# Patient Record
Sex: FEMALE | Race: WHITE | HISPANIC OR LATINO | Employment: UNEMPLOYED | URBAN - METROPOLITAN AREA
[De-identification: names, ages, dates, MRNs, and addresses within clinical notes are randomized per-mention and may not be internally consistent; named-entity substitution may affect disease eponyms.]

---

## 2015-03-17 LAB — HM PAP SMEAR: NORMAL

## 2017-01-25 ENCOUNTER — OFFICE VISIT (OUTPATIENT)
Dept: SPORTS MEDICINE | Facility: CLINIC | Age: 52
End: 2017-01-25

## 2017-01-25 VITALS
BODY MASS INDEX: 24.74 KG/M2 | OXYGEN SATURATION: 99 % | WEIGHT: 126 LBS | DIASTOLIC BLOOD PRESSURE: 72 MMHG | HEART RATE: 80 BPM | SYSTOLIC BLOOD PRESSURE: 118 MMHG | HEIGHT: 60 IN

## 2017-01-25 DIAGNOSIS — I10 BENIGN ESSENTIAL HTN: Primary | ICD-10-CM

## 2017-01-25 DIAGNOSIS — R00.2 HEART PALPITATIONS: ICD-10-CM

## 2017-01-25 DIAGNOSIS — E78.2 MIXED HYPERLIPIDEMIA: ICD-10-CM

## 2017-01-25 DIAGNOSIS — R73.03 PREDIABETES: ICD-10-CM

## 2017-01-25 DIAGNOSIS — K21.9 GASTROESOPHAGEAL REFLUX DISEASE, ESOPHAGITIS PRESENCE NOT SPECIFIED: ICD-10-CM

## 2017-01-25 DIAGNOSIS — R07.89 SENSATION OF CHEST PRESSURE: ICD-10-CM

## 2017-01-25 DIAGNOSIS — M77.12 LATERAL EPICONDYLITIS OF BOTH ELBOWS: ICD-10-CM

## 2017-01-25 DIAGNOSIS — M77.11 LATERAL EPICONDYLITIS OF BOTH ELBOWS: ICD-10-CM

## 2017-01-25 PROCEDURE — 93000 ELECTROCARDIOGRAM COMPLETE: CPT | Performed by: FAMILY MEDICINE

## 2017-01-25 PROCEDURE — 99214 OFFICE O/P EST MOD 30 MIN: CPT | Performed by: FAMILY MEDICINE

## 2017-01-25 RX ORDER — DICLOFENAC SODIUM 75 MG/1
75 TABLET, DELAYED RELEASE ORAL 2 TIMES DAILY PRN
Qty: 60 TABLET | Refills: 1 | Status: SHIPPED | OUTPATIENT
Start: 2017-01-25 | End: 2017-05-25

## 2017-01-25 RX ORDER — LOSARTAN POTASSIUM AND HYDROCHLOROTHIAZIDE 12.5; 5 MG/1; MG/1
1 TABLET ORAL DAILY
Qty: 30 TABLET | Refills: 11 | Status: SHIPPED | OUTPATIENT
Start: 2017-01-25 | End: 2018-01-30 | Stop reason: SDUPTHER

## 2017-01-25 RX ORDER — LOVASTATIN 40 MG/1
40 TABLET ORAL DAILY
Qty: 30 TABLET | Refills: 11 | Status: SHIPPED | OUTPATIENT
Start: 2017-01-25 | End: 2018-01-30 | Stop reason: SDUPTHER

## 2017-01-25 RX ORDER — LORATADINE 10 MG/1
10 TABLET ORAL DAILY
Qty: 30 TABLET | Refills: 11 | Status: SHIPPED | OUTPATIENT
Start: 2017-01-25 | End: 2018-01-30 | Stop reason: SDUPTHER

## 2017-01-25 RX ORDER — FLUTICASONE PROPIONATE 50 MCG
2 SPRAY, SUSPENSION (ML) NASAL DAILY
Qty: 2 EACH | Refills: 11 | Status: SHIPPED | OUTPATIENT
Start: 2017-01-25 | End: 2018-01-30 | Stop reason: SDUPTHER

## 2017-01-25 RX ORDER — LANSOPRAZOLE 30 MG/1
30 CAPSULE, DELAYED RELEASE ORAL DAILY
Qty: 30 CAPSULE | Refills: 11 | Status: SHIPPED | OUTPATIENT
Start: 2017-01-25 | End: 2018-02-27 | Stop reason: SDUPTHER

## 2017-01-25 NOTE — MR AVS SNAPSHOT
Savita Naveed   1/25/2017 8:40 AM   Office Visit    Tfno del dpto:  [phone]   Número de visita:  [phone]    Personal Médico:  Diego Crum MD   Departamento:  [facility] SP[facility]IC[facility]                Your Full Care Plan              Today's Medication Changes          Estos cambios fueron actualizados el: 1/25/17 10:19 AM.  Si tiene alguna pregunta, contacte con woo personal médico.               New Medication(s)Ordered:     diclofenac 75 MG EC tablet   También conocido yimi:  VOLTAREN   Take 1 tablet by mouth 2 (Two) Times a Day As Needed (elbow pain).   Pedido por:  Diego Crum MD         Medication(s)that have changed:     losartan-hydrochlorothiazide 50-12.5 MG per tablet   También conocido yimi:  HYZAAR   Take 1 tablet by mouth Daily.   Lo que cambió:  Richard las nuevas instrucciones.   Modificado por:  Diego Crum MD         Stop taking medication(s)listed here:     meloxicam 15 MG tablet   También conocido yimi:  MOBIC   Cancelado por:  Diego Crum MD                Dónde puede recoger rashida medicamentos      Estos medicamentos fueron enviados a [address] - [phone] [facility] [phone] [place] [address]     Teléfono:  [phone]     diclofenac 75 MG EC tablet    fluticasone 50 MCG/ACT nasal spray    lansoprazole 30 MG capsule    loratadine 10 MG tablet    losartan-hydrochlorothiazide 50-12.5 MG per tablet    lovastatin 40 MG tablet                  Your Updated Medication List          Lista actualizada el: 1/25/17 10:19 AM.  Siempre use woo lista de medicamentos más reciente.                diclofenac 75 MG EC tablet   También conocido yimi:  VOLTAREN   Take 1 tablet by mouth 2 (Two) Times a Day As Needed (elbow pain).       fluticasone 50 MCG/ACT nasal spray   También conocido yimi:  FLONASE   2 sprays into each nostril Daily.       lansoprazole 30 MG capsule   También conocido yimi:  PREVACID   Take 1 capsule by mouth Daily.       loratadine 10 MG tablet   También conocido yimi:  CLARITIN   Take 1 tablet by mouth Daily.       losartan-hydrochlorothiazide 50-12.5 MG per tablet   También conocido yimi:  HYZAAR   Take 1 tablet by mouth Daily.       lovastatin 40 MG tablet   También conocido yimi:  MEVACOR   Take 1 tablet by mouth Daily.       medroxyPROGESTERone 150 MG/ML injection   También conocido yimi:  DEPO-PROVERA               We Performed the Following     CBC & Differential     Comprehensive Metabolic Panel     ECG 12 Lead     Hemoglobin A1c     Lipid Panel     TSH     UA / M With / Rflx Culture([facility] ONLY)       You Were Diagnosed With        Códigos Comentarios    Benign essential HTN    -  Primary ICD-10-CM: I10  ICD-9-CM: 401.1     Mixed hyperlipidemia     ICD-10-CM: E78.2  ICD-9-CM: 272.2     Prediabetes     ICD-10-CM: R73.03  ICD-9-CM: 790.29     Gastroesophageal reflux disease, esophagitis presence not specified     ICD-10-CM: K21.9  ICD-9-CM: 530.81     Lateral epicondylitis of both elbows     ICD-10-CM: M77.11, M77.12  ICD-9-CM: 726.32     Sensation of chest pressure     ICD-10-CM: R07.89  ICD-9-CM: 786.59     Heart palpitations     ICD-10-CM: R00.2  ICD-9-CM: 785.1       Instructions     Chelsy    Patient Instructions History      Upcoming Appointments     Visit Type Date Time Department    OFFICE VISIT 1/25/2017  8:40 AM [facility]    MAMMO THOMAS SCREEN BILAT 1/30/2017  2:00 PM  THOMAS MAMMO [facility]      MyChart Signup     Our records indicate that you have declined [facility] MyChart signup. If you would like to sign up for [employer], please email [facility] [email or web address] or call [phone] to obtain an activation code.             Other Info from Your Visit           Your Appointments     Jan 30, 2017  2:00 PM EST   Mammo thomas screen bilat with THOMAS UCE MAMM 1   [facility] [place]  "Mammography ([place])    [address] [address] [phone]            Bring any films and reports from outside facilities. Arrive 15 min prior to exam time. Do not apply any lotions, oils, body sprays, powders, perfumes, or deodorants on the day of the exam. Bring a current list of medications. Arrive 15 mi              Alergias     No Known Drug Allergy        Reason for Visit     Hypertension Pt is here to f/u      Vital Signs     Blood Pressure Pulso Height Weight Oxygen Saturation Body Mass Index    118/72 80 60\" (152.4 cm) 126 lb (57.2 kg) 99% 24.61 kg/m2    Smoking Status                   Never Smoker           Problems and Diagnoses Noted     Acid reflux disease    Benign essential HTN    High cholesterol or triglycerides    Prediabetes    Lateral epicondylitis of both elbows        Sensation of chest pressure        Heart palpitations                                      Savita Naveed   1/25/2017 8:40 AM   Office Visit    Dept Phone:  [phone]   Encounter #:  [id number]    Provider:  Diego Crum MD   Department:  [facility] FAMILY AND SPORTS MEDICINE                Your Full Care Plan              Today's Medication Changes          These changes are accurate as of: 1/25/17 10:19 AM.  If you have any questions, ask your nurse or doctor.               New Medication(s)Ordered:     diclofenac 75 MG EC tablet   Commonly known as:  VOLTAREN   Take 1 tablet by mouth 2 (Two) Times a Day As Needed (elbow pain).   Started by:  Diego Crum MD         Medication(s)that have changed:     losartan-hydrochlorothiazide 50-12.5 MG per tablet   Commonly known as:  HYZAAR   Take 1 tablet by mouth Daily.   What changed:  See the new instructions.   Changed by:  Diego Crum MD         Stop taking medication(s)listed here:     meloxicam 15 MG tablet   Commonly known as:  MOBIC   Stopped by:  Diego Crum MD                Where to Get Your Medications      These " medications were sent to [address] - [phone]  - [phone] [place] [address]     Phone:  [phone]     diclofenac 75 MG EC tablet    fluticasone 50 MCG/ACT nasal spray    lansoprazole 30 MG capsule    loratadine 10 MG tablet    losartan-hydrochlorothiazide 50-12.5 MG per tablet    lovastatin 40 MG tablet                  Your Updated Medication List          This list is accurate as of: 1/25/17 10:19 AM.  Always use your most recent med list.                diclofenac 75 MG EC tablet   Commonly known as:  VOLTAREN   Take 1 tablet by mouth 2 (Two) Times a Day As Needed (elbow pain).       fluticasone 50 MCG/ACT nasal spray   Commonly known as:  FLONASE   2 sprays into each nostril Daily.       lansoprazole 30 MG capsule   Commonly known as:  PREVACID   Take 1 capsule by mouth Daily.       loratadine 10 MG tablet   Commonly known as:  CLARITIN   Take 1 tablet by mouth Daily.       losartan-hydrochlorothiazide 50-12.5 MG per tablet   Commonly known as:  HYZAAR   Take 1 tablet by mouth Daily.       lovastatin 40 MG tablet   Commonly known as:  MEVACOR   Take 1 tablet by mouth Daily.       medroxyPROGESTERone 150 MG/ML injection   Commonly known as:  DEPO-PROVERA               We Performed the Following     CBC & Differential     Comprehensive Metabolic Panel     ECG 12 Lead     Hemoglobin A1c     Lipid Panel     TSH     UA / M With / Rflx Culture([facility] ONLY)       You Were Diagnosed With        Codes Comments    Benign essential HTN    -  Primary ICD-10-CM: I10  ICD-9-CM: 401.1     Mixed hyperlipidemia     ICD-10-CM: E78.2  ICD-9-CM: 272.2     Prediabetes     ICD-10-CM: R73.03  ICD-9-CM: 790.29     Gastroesophageal reflux disease, esophagitis presence not specified     ICD-10-CM: K21.9  ICD-9-CM: 530.81     Lateral epicondylitis of both elbows     ICD-10-CM: M77.11, M77.12  ICD-9-CM: 726.32     Sensation  "of chest pressure     ICD-10-CM: R07.89  ICD-9-CM: 786.59     Heart palpitations     ICD-10-CM: R00.2  ICD-9-CM: 785.1       Instructions     None    Patient Instructions History      Upcoming Appointments     Visit Type Date Time Department    OFFICE VISIT 1/25/2017  8:40 AM [facility]    MAMMO THOMAS SCREEN BILAT 1/30/2017  2:00 PM  THOMAS MAMMO [facility]      MyChart Signup     Our records indicate that you have declined [facility] MyChart signup. If you would like to sign up for [employer], please email [email or web address] or call [phone] to obtain an activation code.             Other Info from Your Visit           Your Appointments     Jan 30, 2017  2:00 PM EST   Mammo thomas screen bilat with THOMAS UCE MAMM 1   [facility] [place] [facility] ([place])    [address] [place] [address]   [phone]            Bring any films and reports from outside facilities. Arrive 15 min prior to exam time. Do not apply any lotions, oils, body sprays, powders, perfumes, or deodorants on the day of the exam. Bring a current list of medications. Arrive 15 mi              Allergies     No Known Drug Allergy        Reason for Visit     Hypertension Pt is here to f/u      Vital Signs     Blood Pressure Pulse Height Weight Oxygen Saturation Body Mass Index    118/72 80 60\" (152.4 cm) 126 lb (57.2 kg) 99% 24.61 kg/m2    Smoking Status                   Never Smoker           Problems and Diagnoses Noted     Acid reflux disease    Benign essential HTN    High cholesterol or triglycerides    Prediabetes    Lateral epicondylitis of both elbows        Sensation of chest pressure        Heart palpitations            "

## 2017-01-25 NOTE — PROGRESS NOTES
"Savita is a 51 y.o. year old female    Chief Complaint   Patient presents with   • Hypertension     Pt is here to f/u       History of Present Illness  Here today to follow-up on multiple medical problems.    Hypertension, hyperlipidemia are both stable.  Continues relatively healthy diet, not much physical activity.  bilateral elbow pain radiating to the forearm that has been on and off for several weeks.  Sharp, worse with lifting objects.  No injury.  Also reports having some episodic fast heart rate, comes for a few days and then goes away without clear cause and effect.  Is associated with some abnormal sensation in the chest but not really pain.  Is not associated with physical activities.  No associated shortness of breath.    I have reviewed the patient's medical history in detail and updated the computerized patient record.    Review of Systems   Constitutional: Negative.    Respiratory: Negative.    Cardiovascular: Positive for chest pain. Negative for leg swelling.   Musculoskeletal: Positive for myalgias.   Neurological: Negative for numbness.   Psychiatric/Behavioral: Negative.        Visit Vitals   • /72   • Pulse 80   • Ht 60\" (152.4 cm)   • Wt 126 lb (57.2 kg)   • SpO2 99%   • BMI 24.61 kg/m2        Physical Exam   Constitutional: She appears well-developed and well-nourished.   HENT:   Mouth/Throat: Oropharynx is clear and moist.   Eyes: Conjunctivae are normal. Pupils are equal, round, and reactive to light.   Cardiovascular: Normal rate, regular rhythm, normal heart sounds and intact distal pulses.    Pulmonary/Chest: Effort normal and breath sounds normal.   Musculoskeletal:   Bilat UE: Normal appearance. TTP lateral epicondyle and common extensor tendon. Normal elbow ROM. +pain with wrist flexion stretch, resisted wrist extension. Normal strength. No elbow laxity.    Psychiatric: She has a normal mood and affect.   Vitals reviewed.    EKG Interpretation    Indication: elevated HR, atypical " pain    Findings:  Normal Rate  Normal Rhythm  Normal QRS  Normal Axis  Normal ST Segment  Normal T Waves    No prior studies were available for comparison.        Diagnoses and all orders for this visit:    Benign essential HTN  -     CBC & Differential  -     Comprehensive Metabolic Panel  -     Lipid Panel  -     TSH  -     Hemoglobin A1c  -     UA / M With / Rflx Culture([facility] ONLY)    Mixed hyperlipidemia  -     CBC & Differential  -     Comprehensive Metabolic Panel  -     Lipid Panel  -     TSH  -     Hemoglobin A1c  -     UA / M With / Rflx Culture([facility] ONLY)    Prediabetes  -     CBC & Differential  -     Comprehensive Metabolic Panel  -     Lipid Panel  -     TSH  -     Hemoglobin A1c  -     UA / M With / Rflx Culture([facility] ONLY)    Gastroesophageal reflux disease, esophagitis presence not specified    Lateral epicondylitis of both elbows    Sensation of chest pressure  -     ECG 12 Lead    Heart palpitations  -     ECG 12 Lead  -     Holter Monitor - 24 Hour; Future    Other orders  -     losartan-hydrochlorothiazide (HYZAAR) 50-12.5 MG per tablet; Take 1 tablet by mouth Daily.  -     lovastatin (MEVACOR) 40 MG tablet; Take 1 tablet by mouth Daily.  -     lansoprazole (PREVACID) 30 MG capsule; Take 1 capsule by mouth Daily.  -     fluticasone (FLONASE) 50 MCG/ACT nasal spray; 2 sprays into each nostril Daily.  -     loratadine (CLARITIN) 10 MG tablet; Take 1 tablet by mouth Daily.  -     diclofenac (VOLTAREN) 75 MG EC tablet; Take 1 tablet by mouth 2 (Two) Times a Day As Needed (elbow pain).  -     Microscopic Examination    Continue current tx of chronic problems.  Discussed HEP for LE along with voltaren. PT if not improving.  Atypical CP and palpitations - EKG normal, sounds benign. Will check Holter.

## 2017-01-26 LAB
ALBUMIN SERPL-MCNC: 4.3 G/DL (ref 3.5–5.2)
ALBUMIN/GLOB SERPL: 1.5 G/DL
ALP SERPL-CCNC: 48 U/L (ref 39–117)
ALT SERPL-CCNC: 21 U/L (ref 1–33)
APPEARANCE UR: CLEAR
AST SERPL-CCNC: 14 U/L (ref 1–32)
BACTERIA #/AREA URNS HPF: NORMAL /HPF
BASOPHILS # BLD AUTO: 0.02 10*3/MM3 (ref 0–0.2)
BASOPHILS NFR BLD AUTO: 0.3 % (ref 0–1.5)
BILIRUB SERPL-MCNC: 0.4 MG/DL (ref 0.1–1.2)
BILIRUB UR QL STRIP: NEGATIVE
BUN SERPL-MCNC: 11 MG/DL (ref 6–20)
BUN/CREAT SERPL: 15.7 (ref 7–25)
CALCIUM SERPL-MCNC: 9.4 MG/DL (ref 8.6–10.5)
CHLORIDE SERPL-SCNC: 102 MMOL/L (ref 98–107)
CHOLEST SERPL-MCNC: 172 MG/DL (ref 0–200)
CO2 SERPL-SCNC: 24.6 MMOL/L (ref 22–29)
COLOR UR: YELLOW
CREAT SERPL-MCNC: 0.7 MG/DL (ref 0.57–1)
EOSINOPHIL # BLD AUTO: 0.09 10*3/MM3 (ref 0–0.7)
EOSINOPHIL NFR BLD AUTO: 1.4 % (ref 0.3–6.2)
EPI CELLS #/AREA URNS HPF: NORMAL /HPF
ERYTHROCYTE [DISTWIDTH] IN BLOOD BY AUTOMATED COUNT: 15.1 % (ref 11.7–13)
GLOBULIN SER CALC-MCNC: 2.8 GM/DL
GLUCOSE SERPL-MCNC: 90 MG/DL (ref 65–99)
GLUCOSE UR QL: NEGATIVE
HBA1C MFR BLD: 5.15 % (ref 4.8–5.6)
HCT VFR BLD AUTO: 40.8 % (ref 35.6–45.5)
HDLC SERPL-MCNC: 38 MG/DL (ref 40–60)
HGB BLD-MCNC: 13.2 G/DL (ref 11.9–15.5)
HGB UR QL STRIP: NEGATIVE
IMM GRANULOCYTES # BLD: 0.02 10*3/MM3 (ref 0–0.03)
IMM GRANULOCYTES NFR BLD: 0.3 % (ref 0–0.5)
KETONES UR QL STRIP: NEGATIVE
LDLC SERPL CALC-MCNC: 110 MG/DL (ref 0–100)
LEUKOCYTE ESTERASE UR QL STRIP: NEGATIVE
LYMPHOCYTES # BLD AUTO: 2.41 10*3/MM3 (ref 0.9–4.8)
LYMPHOCYTES NFR BLD AUTO: 37 % (ref 19.6–45.3)
MCH RBC QN AUTO: 25.3 PG (ref 26.9–32)
MCHC RBC AUTO-ENTMCNC: 32.4 G/DL (ref 32.4–36.3)
MCV RBC AUTO: 78.3 FL (ref 80.5–98.2)
MICRO URNS: NORMAL
MICRO URNS: NORMAL
MONOCYTES # BLD AUTO: 0.5 10*3/MM3 (ref 0.2–1.2)
MONOCYTES NFR BLD AUTO: 7.7 % (ref 5–12)
MUCOUS THREADS URNS QL MICRO: PRESENT /HPF
NEUTROPHILS # BLD AUTO: 3.48 10*3/MM3 (ref 1.9–8.1)
NEUTROPHILS NFR BLD AUTO: 53.3 % (ref 42.7–76)
NITRITE UR QL STRIP: NEGATIVE
PH UR STRIP: 6.5 [PH] (ref 5–7.5)
PLATELET # BLD AUTO: 334 10*3/MM3 (ref 140–500)
POTASSIUM SERPL-SCNC: 3.9 MMOL/L (ref 3.5–5.2)
PROT SERPL-MCNC: 7.1 G/DL (ref 6–8.5)
PROT UR QL STRIP: NEGATIVE
RBC # BLD AUTO: 5.21 10*6/MM3 (ref 3.9–5.2)
RBC #/AREA URNS HPF: NORMAL /HPF
SODIUM SERPL-SCNC: 140 MMOL/L (ref 136–145)
SP GR UR: 1.01 (ref 1–1.03)
TRIGL SERPL-MCNC: 119 MG/DL (ref 0–150)
TSH SERPL DL<=0.005 MIU/L-ACNC: 1.91 MIU/ML (ref 0.27–4.2)
URINALYSIS REFLEX: NORMAL
UROBILINOGEN UR STRIP-MCNC: 0.2 MG/DL (ref 0.2–1)
VLDLC SERPL CALC-MCNC: 23.8 MG/DL (ref 5–40)
WBC # BLD AUTO: 6.52 10*3/MM3 (ref 4.5–10.7)
WBC #/AREA URNS HPF: NORMAL /HPF

## 2017-01-30 ENCOUNTER — HOSPITAL ENCOUNTER (OUTPATIENT)
Dept: MAMMOGRAPHY | Facility: HOSPITAL | Age: 52
Discharge: HOME | End: 2017-01-30
Attending: OBSTETRICS & GYNECOLOGY | Admitting: OBSTETRICS & GYNECOLOGY

## 2017-01-30 DIAGNOSIS — Z13.9 SCREENING: ICD-10-CM

## 2017-01-30 PROCEDURE — G0202 SCR MAMMO BI INCL CAD: HCPCS

## 2017-02-02 ENCOUNTER — TRANSCRIBE ORDERS (OUTPATIENT)
Dept: ADMINISTRATIVE | Facility: HOSPITAL | Age: 52
End: 2017-02-02

## 2017-02-02 DIAGNOSIS — R92.8 ABNORMAL MAMMOGRAM: Primary | ICD-10-CM

## 2017-02-07 ENCOUNTER — TRANSCRIBE ORDERS (OUTPATIENT)
Dept: ADMINISTRATIVE | Facility: HOSPITAL | Age: 52
End: 2017-02-07

## 2017-02-07 DIAGNOSIS — R92.8 ABNORMAL MAMMOGRAM: Primary | ICD-10-CM

## 2017-02-13 ENCOUNTER — HOSPITAL ENCOUNTER (OUTPATIENT)
Dept: ULTRASOUND IMAGING | Facility: HOSPITAL | Age: 52
End: 2017-02-13
Attending: OBSTETRICS & GYNECOLOGY

## 2017-02-13 ENCOUNTER — HOSPITAL ENCOUNTER (OUTPATIENT)
Dept: MAMMOGRAPHY | Facility: HOSPITAL | Age: 52
Discharge: HOME | End: 2017-02-13
Attending: OBSTETRICS & GYNECOLOGY | Admitting: OBSTETRICS & GYNECOLOGY

## 2017-02-13 DIAGNOSIS — R92.8 ABNORMAL MAMMOGRAM: ICD-10-CM

## 2017-02-13 PROCEDURE — G0206 DX MAMMO INCL CAD UNI: HCPCS

## 2017-05-25 ENCOUNTER — CLINICAL SUPPORT (OUTPATIENT)
Dept: OBSTETRICS AND GYNECOLOGY | Facility: CLINIC | Age: 52
End: 2017-05-25

## 2017-05-25 ENCOUNTER — OFFICE VISIT (OUTPATIENT)
Dept: OBSTETRICS AND GYNECOLOGY | Facility: CLINIC | Age: 52
End: 2017-05-25

## 2017-05-25 VITALS
SYSTOLIC BLOOD PRESSURE: 130 MMHG | HEIGHT: 60 IN | DIASTOLIC BLOOD PRESSURE: 84 MMHG | BODY MASS INDEX: 23.95 KG/M2 | WEIGHT: 122 LBS

## 2017-05-25 DIAGNOSIS — Z01.419 ENCOUNTER FOR GYNECOLOGICAL EXAMINATION WITHOUT ABNORMAL FINDING: Primary | ICD-10-CM

## 2017-05-25 DIAGNOSIS — Z11.51 ENCOUNTER FOR SCREENING FOR HUMAN PAPILLOMAVIRUS (HPV): ICD-10-CM

## 2017-05-25 DIAGNOSIS — N64.4 MASTODYNIA: ICD-10-CM

## 2017-05-25 DIAGNOSIS — Z00.00 ANNUAL PHYSICAL EXAM: ICD-10-CM

## 2017-05-25 DIAGNOSIS — Z30.42 ENCOUNTER FOR SURVEILLANCE OF INJECTABLE CONTRACEPTIVE: Primary | ICD-10-CM

## 2017-05-25 LAB
B-HCG UR QL: NEGATIVE
BILIRUB BLD-MCNC: NEGATIVE MG/DL
CLARITY, POC: CLEAR
COLOR UR: YELLOW
GLUCOSE UR STRIP-MCNC: NEGATIVE MG/DL
INTERNAL NEGATIVE CONTROL: NEGATIVE
INTERNAL POSITIVE CONTROL: POSITIVE
KETONES UR QL: NEGATIVE
LEUKOCYTE EST, POC: NEGATIVE
Lab: NORMAL
NITRITE UR-MCNC: NEGATIVE MG/ML
PH UR: 6 [PH] (ref 5–8)
PROT UR STRIP-MCNC: NEGATIVE MG/DL
RBC # UR STRIP: NEGATIVE /UL
SP GR UR: 1.03 (ref 1–1.03)
UROBILINOGEN UR QL: NORMAL

## 2017-05-25 PROCEDURE — 99396 PREV VISIT EST AGE 40-64: CPT | Performed by: OBSTETRICS & GYNECOLOGY

## 2017-05-25 PROCEDURE — 81025 URINE PREGNANCY TEST: CPT | Performed by: OBSTETRICS & GYNECOLOGY

## 2017-05-25 PROCEDURE — 81002 URINALYSIS NONAUTO W/O SCOPE: CPT | Performed by: OBSTETRICS & GYNECOLOGY

## 2017-05-25 PROCEDURE — 96372 THER/PROPH/DIAG INJ SC/IM: CPT | Performed by: OBSTETRICS & GYNECOLOGY

## 2017-05-25 RX ORDER — MEDROXYPROGESTERONE ACETATE 150 MG/ML
150 INJECTION, SUSPENSION INTRAMUSCULAR
Qty: 150 MG | Refills: 3 | Status: SHIPPED | OUTPATIENT
Start: 2017-05-25 | End: 2017-08-10

## 2017-05-25 RX ORDER — MEDROXYPROGESTERONE ACETATE 150 MG/ML
INJECTION, SUSPENSION INTRAMUSCULAR
COMMUNITY
Start: 2017-03-13 | End: 2019-03-14

## 2017-05-25 RX ORDER — MEDROXYPROGESTERONE ACETATE 150 MG/ML
150 INJECTION, SUSPENSION INTRAMUSCULAR ONCE
Status: COMPLETED | OUTPATIENT
Start: 2017-05-25 | End: 2017-05-25

## 2017-05-25 RX ADMIN — MEDROXYPROGESTERONE ACETATE 150 MG: 150 INJECTION, SUSPENSION INTRAMUSCULAR at 13:48

## 2017-05-27 PROBLEM — N64.4 MASTODYNIA: Status: ACTIVE | Noted: 2017-05-27

## 2017-05-27 PROBLEM — N64.4 BILATERAL MASTODYNIA: Status: ACTIVE | Noted: 2017-05-27

## 2017-05-27 PROBLEM — N64.4 MASTALGIA: Status: RESOLVED | Noted: 2017-05-27 | Resolved: 2017-05-27

## 2017-05-27 PROBLEM — N64.4 MASTALGIA: Status: ACTIVE | Noted: 2017-05-27

## 2017-05-30 LAB
CYTOLOGIST CVX/VAG CYTO: NORMAL
CYTOLOGY CVX/VAG DOC THIN PREP: NORMAL
DX ICD CODE: NORMAL
HIV 1 & 2 AB SER-IMP: NORMAL
HPV I/H RISK 1 DNA CVX QL PROBE+SIG AMP: NEGATIVE
OTHER STN SPEC: NORMAL
PATH REPORT.FINAL DX SPEC: NORMAL
STAT OF ADQ CVX/VAG CYTO-IMP: NORMAL

## 2017-08-10 ENCOUNTER — CLINICAL SUPPORT (OUTPATIENT)
Dept: OBSTETRICS AND GYNECOLOGY | Facility: CLINIC | Age: 52
End: 2017-08-10

## 2017-08-10 DIAGNOSIS — Z30.013 ENCOUNTER FOR INITIAL PRESCRIPTION OF INJECTABLE CONTRACEPTIVE: Primary | ICD-10-CM

## 2017-08-10 PROCEDURE — 96372 THER/PROPH/DIAG INJ SC/IM: CPT | Performed by: OBSTETRICS & GYNECOLOGY

## 2017-08-10 RX ORDER — MEDROXYPROGESTERONE ACETATE 150 MG/ML
150 INJECTION, SUSPENSION INTRAMUSCULAR ONCE
Status: COMPLETED | OUTPATIENT
Start: 2017-08-10 | End: 2017-08-10

## 2017-08-10 RX ADMIN — MEDROXYPROGESTERONE ACETATE 150 MG: 150 INJECTION, SUSPENSION INTRAMUSCULAR at 12:56

## 2018-01-09 ENCOUNTER — TRANSCRIBE ORDERS (OUTPATIENT)
Dept: ADMINISTRATIVE | Facility: HOSPITAL | Age: 53
End: 2018-01-09

## 2018-01-09 DIAGNOSIS — Z12.31 VISIT FOR SCREENING MAMMOGRAM: Primary | ICD-10-CM

## 2018-01-30 RX ORDER — LORATADINE 10 MG/1
TABLET ORAL
Qty: 30 TABLET | Refills: 0 | Status: SHIPPED | OUTPATIENT
Start: 2018-01-30 | End: 2018-02-27 | Stop reason: SDUPTHER

## 2018-01-30 RX ORDER — LOSARTAN POTASSIUM AND HYDROCHLOROTHIAZIDE 12.5; 5 MG/1; MG/1
TABLET ORAL
Qty: 30 TABLET | Refills: 0 | Status: SHIPPED | OUTPATIENT
Start: 2018-01-30 | End: 2018-02-27 | Stop reason: SDUPTHER

## 2018-01-30 RX ORDER — FLUTICASONE PROPIONATE 50 MCG
SPRAY, SUSPENSION (ML) NASAL
Qty: 1 BOTTLE | Refills: 0 | Status: SHIPPED | OUTPATIENT
Start: 2018-01-30 | End: 2018-02-27 | Stop reason: SDUPTHER

## 2018-01-30 RX ORDER — LOVASTATIN 40 MG/1
TABLET ORAL
Qty: 30 TABLET | Refills: 0 | Status: SHIPPED | OUTPATIENT
Start: 2018-01-30 | End: 2018-02-27 | Stop reason: SDUPTHER

## 2018-02-13 ENCOUNTER — HOSPITAL ENCOUNTER (OUTPATIENT)
Dept: MAMMOGRAPHY | Facility: HOSPITAL | Age: 53
Discharge: HOME | End: 2018-02-13
Attending: OBSTETRICS & GYNECOLOGY | Admitting: OBSTETRICS & GYNECOLOGY

## 2018-02-13 DIAGNOSIS — Z12.31 VISIT FOR SCREENING MAMMOGRAM: ICD-10-CM

## 2018-02-13 PROCEDURE — 77063 BREAST TOMOSYNTHESIS BI: CPT

## 2018-02-13 PROCEDURE — 77067 SCR MAMMO BI INCL CAD: CPT

## 2018-02-18 DIAGNOSIS — R92.8 ABNORMAL MAMMOGRAM OF RIGHT BREAST: Primary | ICD-10-CM

## 2018-02-27 ENCOUNTER — OFFICE VISIT (OUTPATIENT)
Dept: SPORTS MEDICINE | Facility: CLINIC | Age: 53
End: 2018-02-27

## 2018-02-27 VITALS
HEIGHT: 60 IN | OXYGEN SATURATION: 98 % | BODY MASS INDEX: 23.75 KG/M2 | TEMPERATURE: 98.6 F | SYSTOLIC BLOOD PRESSURE: 108 MMHG | WEIGHT: 121 LBS | HEART RATE: 83 BPM | DIASTOLIC BLOOD PRESSURE: 64 MMHG

## 2018-02-27 DIAGNOSIS — R71.8 MICROCYTIC RED BLOOD CELLS: ICD-10-CM

## 2018-02-27 DIAGNOSIS — Z00.00 ANNUAL PHYSICAL EXAM: Primary | ICD-10-CM

## 2018-02-27 PROCEDURE — 99396 PREV VISIT EST AGE 40-64: CPT | Performed by: FAMILY MEDICINE

## 2018-02-27 RX ORDER — LORATADINE 10 MG/1
10 TABLET ORAL DAILY
Qty: 90 TABLET | Refills: 3 | Status: SHIPPED | OUTPATIENT
Start: 2018-02-27 | End: 2019-03-07 | Stop reason: SDUPTHER

## 2018-02-27 RX ORDER — LANSOPRAZOLE 30 MG/1
30 CAPSULE, DELAYED RELEASE ORAL DAILY
Qty: 90 CAPSULE | Refills: 3 | Status: SHIPPED | OUTPATIENT
Start: 2018-02-27 | End: 2020-08-03 | Stop reason: SDUPTHER

## 2018-02-27 RX ORDER — LOVASTATIN 40 MG/1
40 TABLET ORAL NIGHTLY
Qty: 90 TABLET | Refills: 3 | Status: SHIPPED | OUTPATIENT
Start: 2018-02-27 | End: 2019-03-07 | Stop reason: SDUPTHER

## 2018-02-27 RX ORDER — LOSARTAN POTASSIUM AND HYDROCHLOROTHIAZIDE 12.5; 5 MG/1; MG/1
1 TABLET ORAL DAILY
Qty: 90 TABLET | Refills: 3 | Status: SHIPPED | OUTPATIENT
Start: 2018-02-27 | End: 2019-03-07 | Stop reason: SDUPTHER

## 2018-02-27 RX ORDER — FLUTICASONE PROPIONATE 50 MCG
2 SPRAY, SUSPENSION (ML) NASAL DAILY
Qty: 3 BOTTLE | Refills: 3 | Status: SHIPPED | OUTPATIENT
Start: 2018-02-27 | End: 2019-03-07 | Stop reason: SDUPTHER

## 2018-02-28 LAB
25(OH)D3+25(OH)D2 SERPL-MCNC: 28 NG/ML (ref 30–100)
ALBUMIN SERPL-MCNC: 4.5 G/DL (ref 3.5–5.2)
ALBUMIN/GLOB SERPL: 1.7 G/DL
ALP SERPL-CCNC: 55 U/L (ref 39–117)
ALT SERPL-CCNC: 28 U/L (ref 1–33)
APPEARANCE UR: CLEAR
AST SERPL-CCNC: 18 U/L (ref 1–32)
BACTERIA #/AREA URNS HPF: NORMAL /HPF
BASOPHILS # BLD AUTO: 0.03 10*3/MM3 (ref 0–0.2)
BASOPHILS NFR BLD AUTO: 0.3 % (ref 0–1.5)
BILIRUB SERPL-MCNC: 0.3 MG/DL (ref 0.1–1.2)
BILIRUB UR QL STRIP: NEGATIVE
BUN SERPL-MCNC: 11 MG/DL (ref 6–20)
BUN/CREAT SERPL: 15.7 (ref 7–25)
CALCIUM SERPL-MCNC: 9.5 MG/DL (ref 8.6–10.5)
CHLORIDE SERPL-SCNC: 101 MMOL/L (ref 98–107)
CHOLEST SERPL-MCNC: 177 MG/DL (ref 0–200)
CO2 SERPL-SCNC: 25.2 MMOL/L (ref 22–29)
COLOR UR: YELLOW
CREAT SERPL-MCNC: 0.7 MG/DL (ref 0.57–1)
EOSINOPHIL # BLD AUTO: 0.11 10*3/MM3 (ref 0–0.7)
EOSINOPHIL NFR BLD AUTO: 1.2 % (ref 0.3–6.2)
EPI CELLS #/AREA URNS HPF: NORMAL /HPF
ERYTHROCYTE [DISTWIDTH] IN BLOOD BY AUTOMATED COUNT: 15.2 % (ref 11.7–13)
GFR SERPLBLD CREATININE-BSD FMLA CKD-EPI: 107 ML/MIN/1.73
GFR SERPLBLD CREATININE-BSD FMLA CKD-EPI: 89 ML/MIN/1.73
GLOBULIN SER CALC-MCNC: 2.7 GM/DL
GLUCOSE SERPL-MCNC: 95 MG/DL (ref 65–99)
GLUCOSE UR QL: NEGATIVE
HBA1C MFR BLD: 5.3 % (ref 4.8–5.6)
HCT VFR BLD AUTO: 40.9 % (ref 35.6–45.5)
HDLC SERPL-MCNC: 43 MG/DL (ref 40–60)
HGB BLD-MCNC: 13 G/DL (ref 11.9–15.5)
HGB UR QL STRIP: NEGATIVE
IMM GRANULOCYTES # BLD: 0.02 10*3/MM3 (ref 0–0.03)
IMM GRANULOCYTES NFR BLD: 0.2 % (ref 0–0.5)
KETONES UR QL STRIP: NEGATIVE
LDLC SERPL CALC-MCNC: 107 MG/DL (ref 0–100)
LEUKOCYTE ESTERASE UR QL STRIP: NEGATIVE
LYMPHOCYTES # BLD AUTO: 3.11 10*3/MM3 (ref 0.9–4.8)
LYMPHOCYTES NFR BLD AUTO: 33.2 % (ref 19.6–45.3)
MCH RBC QN AUTO: 24.5 PG (ref 26.9–32)
MCHC RBC AUTO-ENTMCNC: 31.8 G/DL (ref 32.4–36.3)
MCV RBC AUTO: 77 FL (ref 80.5–98.2)
MICRO URNS: NORMAL
MICRO URNS: NORMAL
MONOCYTES # BLD AUTO: 0.75 10*3/MM3 (ref 0.2–1.2)
MONOCYTES NFR BLD AUTO: 8 % (ref 5–12)
NEUTROPHILS # BLD AUTO: 5.35 10*3/MM3 (ref 1.9–8.1)
NEUTROPHILS NFR BLD AUTO: 57.1 % (ref 42.7–76)
NITRITE UR QL STRIP: NEGATIVE
PH UR STRIP: 6.5 [PH] (ref 5–7.5)
PLATELET # BLD AUTO: 320 10*3/MM3 (ref 140–500)
POTASSIUM SERPL-SCNC: 3.5 MMOL/L (ref 3.5–5.2)
PROT SERPL-MCNC: 7.2 G/DL (ref 6–8.5)
PROT UR QL STRIP: NEGATIVE
RBC # BLD AUTO: 5.31 10*6/MM3 (ref 3.9–5.2)
RBC #/AREA URNS HPF: NORMAL /HPF
SODIUM SERPL-SCNC: 141 MMOL/L (ref 136–145)
SP GR UR: 1.01 (ref 1–1.03)
TRIGL SERPL-MCNC: 136 MG/DL (ref 0–150)
TSH SERPL DL<=0.005 MIU/L-ACNC: 2.07 UIU/ML (ref 0.45–4.5)
URINALYSIS REFLEX: NORMAL
UROBILINOGEN UR STRIP-MCNC: 0.2 MG/DL (ref 0.2–1)
VLDLC SERPL CALC-MCNC: 27.2 MG/DL (ref 5–40)
WBC # BLD AUTO: 9.37 10*3/MM3 (ref 4.5–10.7)
WBC #/AREA URNS HPF: NORMAL /HPF

## 2018-03-05 LAB
FERRITIN SERPL-MCNC: 316.5 NG/ML (ref 13–150)
IRON SATN MFR SERPL: 30 % (ref 20–50)
IRON SERPL-MCNC: 119 MCG/DL (ref 37–145)
Lab: NORMAL
TIBC SERPL-MCNC: 402 MCG/DL
UIBC SERPL-MCNC: 283 MCG/DL
WRITTEN AUTHORIZATION: NORMAL

## 2018-03-06 ENCOUNTER — HOSPITAL ENCOUNTER (OUTPATIENT)
Dept: MAMMOGRAPHY | Facility: HOSPITAL | Age: 53
Discharge: HOME | End: 2018-03-06
Attending: OBSTETRICS & GYNECOLOGY | Admitting: OBSTETRICS & GYNECOLOGY

## 2018-03-06 ENCOUNTER — APPOINTMENT (OUTPATIENT)
Dept: ULTRASOUND IMAGING | Facility: HOSPITAL | Age: 53
End: 2018-03-06
Attending: OBSTETRICS & GYNECOLOGY

## 2018-03-06 DIAGNOSIS — R92.8 ABNORMAL MAMMOGRAM OF RIGHT BREAST: ICD-10-CM

## 2018-03-06 PROCEDURE — 77065 DX MAMMO INCL CAD UNI: CPT

## 2018-03-20 ENCOUNTER — OFFICE VISIT (OUTPATIENT)
Dept: OBSTETRICS AND GYNECOLOGY | Facility: CLINIC | Age: 53
End: 2018-03-20

## 2018-03-20 ENCOUNTER — TELEPHONE (OUTPATIENT)
Dept: SURGERY | Facility: CLINIC | Age: 53
End: 2018-03-20

## 2018-03-20 VITALS
HEIGHT: 60 IN | BODY MASS INDEX: 25.91 KG/M2 | WEIGHT: 132 LBS | DIASTOLIC BLOOD PRESSURE: 70 MMHG | SYSTOLIC BLOOD PRESSURE: 118 MMHG

## 2018-03-20 DIAGNOSIS — Z01.419 ENCOUNTER FOR GYNECOLOGICAL EXAMINATION WITHOUT ABNORMAL FINDING: Primary | ICD-10-CM

## 2018-03-20 DIAGNOSIS — N91.2 AMENORRHEA: ICD-10-CM

## 2018-03-20 DIAGNOSIS — Z11.51 SPECIAL SCREENING EXAMINATION FOR HUMAN PAPILLOMAVIRUS (HPV): ICD-10-CM

## 2018-03-20 DIAGNOSIS — Z13.9 SCREENING FOR CONDITION: ICD-10-CM

## 2018-03-20 DIAGNOSIS — N64.4 BREAST TENDERNESS: ICD-10-CM

## 2018-03-20 LAB
B-HCG UR QL: NEGATIVE
BILIRUB BLD-MCNC: NEGATIVE MG/DL
CLARITY, POC: CLEAR
COLOR UR: YELLOW
GLUCOSE UR STRIP-MCNC: NEGATIVE MG/DL
INTERNAL NEGATIVE CONTROL: NEGATIVE
INTERNAL POSITIVE CONTROL: POSITIVE
KETONES UR QL: NEGATIVE
LEUKOCYTE EST, POC: NEGATIVE
Lab: NORMAL
NITRITE UR-MCNC: NEGATIVE MG/ML
PH UR: 5 [PH] (ref 5–8)
PROT UR STRIP-MCNC: NEGATIVE MG/DL
RBC # UR STRIP: NEGATIVE /UL
SP GR UR: 1 (ref 1–1.03)
UROBILINOGEN UR QL: NORMAL

## 2018-03-20 PROCEDURE — 99214 OFFICE O/P EST MOD 30 MIN: CPT | Performed by: OBSTETRICS & GYNECOLOGY

## 2018-03-20 PROCEDURE — 81002 URINALYSIS NONAUTO W/O SCOPE: CPT | Performed by: OBSTETRICS & GYNECOLOGY

## 2018-03-20 PROCEDURE — 99396 PREV VISIT EST AGE 40-64: CPT | Performed by: OBSTETRICS & GYNECOLOGY

## 2018-03-20 PROCEDURE — 81025 URINE PREGNANCY TEST: CPT | Performed by: OBSTETRICS & GYNECOLOGY

## 2018-03-20 NOTE — PROGRESS NOTES
GYN Annual Exam     CC- Here for annual exam.     Savita Lucio is a 52 y.o. female who presents for annual well woman exam. Pt stopped using depo provera when she turned 52. She has had 1 day of spotting in Feb only. Denies any vasomotor symptoms. Reports that she is getting set up for colonoscopy by primary. Pt still reporting bilateral breast pain. Has been taking Vitamin E and has stopped depo provera and the symptoms persist. Had a Dx MMG/US that was normal. Pt reports the pain is all the time and makes it hard for her to sleep. HPI obtained with  phone bc patient only speaks Estonian.    OB History      Para Term  AB Living    1 1 1 0 0 1    SAB TAB Ectopic Molar Multiple Live Births    0 0 0  0 1          Current contraception: none  History of abnormal Pap smear: no  History of abnormal mammogram: yes - repeat normal  Family history of uterine, colon or ovarian cancer: no  Family history of breast cancer: no    Health Maintenance   Topic Date Due   • DXA SCAN  2017   • COLONOSCOPY  08/10/2017   • INFLUENZA VACCINE  2019 (Originally 2017)   • TDAP/TD VACCINES (1 - Tdap) 2019 (Originally 1986)   • LIPID PANEL  2019   • MAMMOGRAM  2019   • PAP SMEAR  2020       Past Medical History:   Diagnosis Date   • Acid reflux 7/15/2016   • Breast pain    • Contraception    • History of Papanicolaou smear of cervix 2015   • HLD (hyperlipidemia) 7/15/2016   • Hypertension        Past Surgical History:   Procedure Laterality Date   •  SECTION           Current Outpatient Prescriptions:   •  fluticasone (FLONASE) 50 MCG/ACT nasal spray, 2 sprays into each nostril Daily., Disp: 3 bottle, Rfl: 3  •  lansoprazole (PREVACID) 30 MG capsule, Take 1 capsule by mouth Daily., Disp: 90 capsule, Rfl: 3  •  loratadine (CLARITIN) 10 MG tablet, Take 1 tablet by mouth Daily., Disp: 90 tablet, Rfl: 3  •  losartan-hydrochlorothiazide (HYZAAR) 50-12.5 MG per  "tablet, Take 1 tablet by mouth Daily., Disp: 90 tablet, Rfl: 3  •  lovastatin (MEVACOR) 40 MG tablet, Take 1 tablet by mouth Every Night., Disp: 90 tablet, Rfl: 3  •  MedroxyPROGESTERone Acetate 150 MG/ML suspension prefilled syringe, , Disp: , Rfl:     Allergies   Allergen Reactions   • No Known Drug Allergy        Social History   Substance Use Topics   • Smoking status: Never Smoker   • Smokeless tobacco: Never Used   • Alcohol use No   Pt is .    Family History   Problem Relation Age of Onset   • Heart attack Mother    • Hyperlipidemia Father    • Hyperlipidemia Sister    • No Known Problems Son    • Cancer Cousin        Review of Systems   Gastrointestinal: Negative for abdominal distention and abdominal pain.   Endocrine: Negative for cold intolerance and heat intolerance.   Genitourinary: Negative for dyspareunia, menstrual problem, pelvic pain, vaginal bleeding and vaginal discharge.       /70   Ht 152.4 cm (60\")   Wt 59.9 kg (132 lb)   LMP  (LMP Unknown)   BMI 25.78 kg/m²     Physical Exam   Constitutional: She is oriented to person, place, and time. She appears well-developed and well-nourished. No distress.   HENT:   Mouth/Throat: Normal dentition. No dental caries.   Cardiovascular: Normal rate and regular rhythm.    Pulmonary/Chest: Effort normal and breath sounds normal. Right breast exhibits tenderness. Right breast exhibits no inverted nipple, no mass, no nipple discharge and no skin change. Left breast exhibits tenderness. Left breast exhibits no inverted nipple, no mass, no nipple discharge and no skin change.       Abdominal: Soft. She exhibits no distension and no mass. There is no tenderness.   Genitourinary: There is no rash, tenderness or lesion on the right labia. There is no rash, tenderness or lesion on the left labia. Uterus is not deviated, not enlarged, not fixed and not tender. Cervix exhibits no motion tenderness, no discharge and no friability. Right adnexum displays " no mass, no tenderness and no fullness. Left adnexum displays no mass, no tenderness and no fullness. No tenderness or bleeding in the vagina. No vaginal discharge found.   Neurological: She is alert and oriented to person, place, and time.   Skin: She is not diaphoretic.   Psychiatric: She has a normal mood and affect. Her behavior is normal. Judgment and thought content normal.   Vitals reviewed.         Assessment/Plan    1) GYN HM: Check pap smear. SBE demonstrated and encouraged. Pt instructed that she needs a colonoscopy since she is 52.  2) Bilateral breast tenderness: Pt has had these symptoms for 1 year. She had a normal DX MMG/US last year. This year she required additional imaging after her screening MMG 2/2018 due to microcalcification. Her breast US was normal. Pt has been on Vitamin E. Stopping depo provera has not helped alleviate symptoms. PE reveals tenderness as well. Will have pt see Dr Mesa for exam and management.  3) Amenorrhea: Stopped depo provera 6/2017. Pt is not having any vasomotor symptoms but has not had a menstrual cycle. Check FSH and estradiol.  4) Bone health - Weight bearing exercise, dietary calcium recommendations and vitamin D reviewed.   5) Diet and Exercise discussed  6) Smoking Status: nonsmoker  7) Social: Pt is . Tajik speaking only.  8) Follow up prn and 1 year       Diagnoses and all orders for this visit:    Encounter for gynecological examination without abnormal finding    Screening for condition  -     POC Urinalysis Dipstick  -     POC Pregnancy, Urine    Amenorrhea  -     Follicle Stimulating Hormone  -     Estradiol    Breast tenderness  -     Ambulatory Referral to Breast Surgery    Special screening examination for human papillomavirus (HPV)  -     PapIG, HPV, Rfx 16 / 18        Alina Piper DO  3/22/2018  6:18 AM

## 2018-03-27 DIAGNOSIS — N91.2 AMENORRHEA: Primary | ICD-10-CM

## 2018-03-27 LAB
ESTRADIOL SERPL-MCNC: NORMAL PG/ML
FSH SERPL-ACNC: NORMAL MIU/ML
REQUEST PROBLEM: NORMAL

## 2018-03-28 LAB
ESTRADIOL SERPL-MCNC: 57.9 PG/ML
FSH SERPL-ACNC: 5.8 MIU/ML

## 2018-04-19 ENCOUNTER — OFFICE VISIT (OUTPATIENT)
Dept: SURGERY | Facility: CLINIC | Age: 53
End: 2018-04-19

## 2018-04-19 VITALS
HEIGHT: 61 IN | SYSTOLIC BLOOD PRESSURE: 122 MMHG | OXYGEN SATURATION: 99 % | BODY MASS INDEX: 23.79 KG/M2 | DIASTOLIC BLOOD PRESSURE: 80 MMHG | HEART RATE: 127 BPM | WEIGHT: 126 LBS

## 2018-04-19 DIAGNOSIS — R10.31 RIGHT LOWER QUADRANT ABDOMINAL PAIN: Primary | ICD-10-CM

## 2018-04-19 PROCEDURE — 99244 OFF/OP CNSLTJ NEW/EST MOD 40: CPT | Performed by: SURGERY

## 2018-04-19 NOTE — PROGRESS NOTES
Chief Complaint   Patient presents with   • Abdominal Pain        Patient is a 52 y.o. female referred by Diego Crum MD for evaluation of RLQ abdominal pain. Patient reports the pain started approximately 1 month ago and was intermittent.  Patient now reports that the pain is worse and more frequent.  She reports that after eating the pain is worse radiates to the right side.  Patient denies nausea, vomiting, fever, chills, jaundice, noemi colored stools or dark urine.  Patient has never had a colonoscopy, flexible sigmoidoscopy or barium enema.  Patient denies melena, hematochezia or bright red blood per rectum.  Patient reports she does have hemorrhoids and has had a previous hemorrhoidectomy.  Patient has no family history of ulcerative colitis, Crohn's disease, familial polyposis or colon cancer.  Patient reports that her mother had a history of constipation and did die at age 47 from myocardial infarction.    Past Medical History:   Diagnosis Date   • Abdominal pain    • Acid reflux 7/15/2016   • Anemia    • Breast pain    • Contraception    • Hemorrhoid    • History of Papanicolaou smear of cervix 2015   • HLD (hyperlipidemia) 7/15/2016   • Hypertension      Past Surgical History:   Procedure Laterality Date   •  SECTION     • HEMORRHOIDECTOMY       Family History   Problem Relation Age of Onset   • Heart attack Mother    • Hyperlipidemia Father    • Hyperlipidemia Sister    • No Known Problems Son    • Cancer Cousin      Social History   Substance Use Topics   • Smoking status: Never Smoker   • Smokeless tobacco: Never Used   • Alcohol use No         Current Outpatient Prescriptions:   •  fluticasone (FLONASE) 50 MCG/ACT nasal spray, 2 sprays into each nostril Daily., Disp: 3 bottle, Rfl: 3  •  lansoprazole (PREVACID) 30 MG capsule, Take 1 capsule by mouth Daily., Disp: 90 capsule, Rfl: 3  •  loratadine (CLARITIN) 10 MG tablet, Take 1 tablet by mouth Daily., Disp: 90 tablet, Rfl: 3  •   "losartan-hydrochlorothiazide (HYZAAR) 50-12.5 MG per tablet, Take 1 tablet by mouth Daily., Disp: 90 tablet, Rfl: 3  •  lovastatin (MEVACOR) 40 MG tablet, Take 1 tablet by mouth Every Night., Disp: 90 tablet, Rfl: 3  •  MedroxyPROGESTERone Acetate 150 MG/ML suspension prefilled syringe, , Disp: , Rfl:   •  Polyethylene Glycol 3350 (MIRALAX PO), Take  by mouth., Disp: , Rfl:     Review of Systems   Gastrointestinal: Positive for abdominal distention, abdominal pain, constipation, diarrhea and rectal pain.   All other systems reviewed and are negative.      Vitals:    04/19/18 1036   BP: 122/80   Pulse: (!) 127   SpO2: 99%   Weight: 57.2 kg (126 lb)   Height: 154.9 cm (61\")       Physical Exam  General/physcological:   Alert and oriented x3 in no acute distress  HEENT: Normal cephalic, atraumatic, PERRLA, EOMI, sclera anicteric, moist mucous membranes, neck is supple, no JVD, no carotid bruits, no thyromegaly no adenopathy  Respiratory: CTA and percussion  CVA: RRR, normal S1-S2, no murmurs, no gallops or rubs  GI: Positive BS, soft, nondistended, mild tenderness over the right lower quadrant upon palpation, no rebound, no guarding, no hernias, no organomegaly and no masses  Rectal: Reveals rectal tags from previous hemorrhoidectomy, no evidence of any rectal masses  Musculoskeletal: Full range of motion, no clubbing, no cyanosis or edema  Neurovascular: Grossly intact    Patient does not use tobacco products currently and I have counseled the patient to not start using tobacco products in the future.    Assessment:  Right lower quadrant abdominal pain  Plan:  I have advised patient that this should be further evaluated with a colonoscopy.  However, patient also associates the pain with eating and I cannot rule out underlying gallbladder disease.  I have discussed this with the patient and her  who has accompanied her.  I have discussed the procedure in detail.  I have discussed the risk, benefits and " alternatives.  I have discussed the risk of anesthesia, bleeding and perforation.  Patient understands these risk, benefits and alternatives.  I have her scheduled at her earliest convenience.  Patient also was instructed that she may need further workup with an ultrasound and HIDA scan.    Jamia Willis MD  General, Minimally Invasive and Endoscopic Surgery  [facility]    [address], [address]  [place], [place], [address]  P: [phone]  F: [phone]    Cc:  Diego Crum MD

## 2018-04-30 ENCOUNTER — OUTSIDE FACILITY SERVICE (OUTPATIENT)
Dept: SURGERY | Facility: CLINIC | Age: 53
End: 2018-04-30

## 2018-04-30 PROCEDURE — 47538 PERQ PLMT BILE DUCT STENT: CPT | Performed by: SURGERY

## 2018-05-03 DIAGNOSIS — R10.11 RIGHT UPPER QUADRANT ABDOMINAL PAIN: Primary | ICD-10-CM

## 2018-05-09 ENCOUNTER — HOSPITAL ENCOUNTER (OUTPATIENT)
Dept: ULTRASOUND IMAGING | Facility: HOSPITAL | Age: 53
Discharge: HOME | End: 2018-05-09
Attending: SURGERY | Admitting: SURGERY

## 2018-05-09 ENCOUNTER — HOSPITAL ENCOUNTER (OUTPATIENT)
Dept: NUCLEAR MEDICINE | Facility: HOSPITAL | Age: 53
Discharge: HOME | End: 2018-05-09
Attending: SURGERY

## 2018-05-09 DIAGNOSIS — R10.11 RIGHT UPPER QUADRANT ABDOMINAL PAIN: ICD-10-CM

## 2018-05-09 PROCEDURE — 78227 HEPATOBIL SYST IMAGE W/DRUG: CPT

## 2018-05-09 PROCEDURE — 25010000002 SINCALIDE PER 5 MCG: Performed by: SURGERY

## 2018-05-09 PROCEDURE — A9537 TC99M MEBROFENIN: HCPCS | Performed by: SURGERY

## 2018-05-09 PROCEDURE — 0 TECHNETIUM TC 99M MEBROFENIN KIT: Performed by: SURGERY

## 2018-05-09 PROCEDURE — 76705 ECHO EXAM OF ABDOMEN: CPT

## 2018-05-09 RX ORDER — KIT FOR THE PREPARATION OF TECHNETIUM TC 99M MEBROFENIN 45 MG/10ML
1 INJECTION, POWDER, LYOPHILIZED, FOR SOLUTION INTRAVENOUS
Status: COMPLETED | OUTPATIENT
Start: 2018-05-09 | End: 2018-05-09

## 2018-05-09 RX ADMIN — MEBROFENIN 1 DOSE: 45 INJECTION, POWDER, LYOPHILIZED, FOR SOLUTION INTRAVENOUS at 10:13

## 2018-05-09 RX ADMIN — SINCALIDE 1.1 MCG: 5 INJECTION, POWDER, LYOPHILIZED, FOR SOLUTION INTRAVENOUS at 11:28

## 2018-05-16 ENCOUNTER — TELEPHONE (OUTPATIENT)
Dept: SURGERY | Facility: CLINIC | Age: 53
End: 2018-05-16

## 2018-05-16 NOTE — TELEPHONE ENCOUNTER
Michelle Burgess in Nursing Administration at 8111  She will have a  here on 6/7/18 at 12:30  For patient to see . She will also have them call her regarding the appt.    [email or web address]

## 2018-05-24 ENCOUNTER — TELEPHONE (OUTPATIENT)
Dept: SURGERY | Facility: CLINIC | Age: 53
End: 2018-05-24

## 2018-05-24 NOTE — TELEPHONE ENCOUNTER
With the help of Jennifer due to language barrier was able to clarify breast tenderness/pain with patient;    Breast tenderness consist for a long time, last 3 mons burning pulsating pain throughout both breast, behind nipples and all throughout roundness of breast.   Not specific to one spot. No new palpable lumps or masses. No pain in arm pits.    lml

## 2018-05-31 ENCOUNTER — TELEPHONE (OUTPATIENT)
Dept: SURGERY | Facility: CLINIC | Age: 53
End: 2018-05-31

## 2018-05-31 NOTE — TELEPHONE ENCOUNTER
Moved pt's appt to arrive at 1:00  Due to dr being in surgery    LM for pt to call and confirm  kdl

## 2018-06-05 ENCOUNTER — OFFICE VISIT (OUTPATIENT)
Dept: SURGERY | Facility: CLINIC | Age: 53
End: 2018-06-05

## 2018-06-05 VITALS
HEIGHT: 61 IN | BODY MASS INDEX: 23.03 KG/M2 | DIASTOLIC BLOOD PRESSURE: 82 MMHG | OXYGEN SATURATION: 98 % | WEIGHT: 122 LBS | HEART RATE: 84 BPM | SYSTOLIC BLOOD PRESSURE: 118 MMHG

## 2018-06-05 DIAGNOSIS — N64.4 MASTODYNIA: Primary | ICD-10-CM

## 2018-06-05 DIAGNOSIS — R92.1 BREAST CALCIFICATIONS ON MAMMOGRAM: ICD-10-CM

## 2018-06-05 PROCEDURE — 99243 OFF/OP CNSLTJ NEW/EST LOW 30: CPT | Performed by: SURGERY

## 2018-06-05 NOTE — PROGRESS NOTES
Chief Complaint: Savita Lucio is a 52 y.o. female who was seen in consultation at the request of Alina Piper DO  for breast pain    History of Present Illness:  Patient presents with breast pain. She noted no new masses, skin changes, nipple discharge, nipple changes prior to her most recent imaging. Paino started 6 years ago when she started the depo provera and is described as a burning pain whole breast, behind nipples, worse when touched.  She stopped the depo provera in 2017 and the pain has been worse since the new year, February through April and starting May has improved somewhat.  She wears a bra most of the time.  SHe takes vitamin E, 400 units once daily and has taken this since she first started having the pain 6 years ago with starting the depo.    Her most recent imaging includes the followin/30/17 [facility]   REMA DIG SAVITA CHILD  Scattered fibroglandular densities. Middle third of the upper-inner quadrant of the right breast, there is an area of focal asymmetry.  Impression: Further evaluation with spot compression and possible targeted right breast sonography.  BIRADS Category 0.    17 E  RT DIAG SAVITA CHILD    With additional imagining there is resolution of the area of foal asymmetry in the upper inner quadrant of the right breast.  BIRADS Category 1: Negative.    18           [facility] Bilateral Mammo Screening Tomosynthesis                        Savita Lucio  Scattered fibroglandular densities. Posterior one-third lower outer quadrant of the right breast,  There has been interval development of microcalcifications.  Impression  There are no findings suspicious for malignancy in the left breast.  Birads Category 0      3/6/18  [facility]  RT DIAG SAVITA CHILD  Unilateral right digital spot magnification. There are punctuate monomorphic scattered regional microcalcification seen in the right breast. They have a stable  appearance.  BIRADS Category 2: Benign findings.    She has not had a breast biopsy in the past.  She has her uterus and ovaries, is ? perimenopausal, and takes nor hormones. She stopped the depo in November and tells me that she had a little spoting in March and in May. No other.    Her family history includes the following: She has no daughters, 4 sisters, 4 maternal aunts, 4 paternal aunts.  No breast or ovarian cancer in her family.    She drinks an occasional coffee and an occasional cola.    She is here for evaluation.    Review of Systems:  Review of Systems   All other systems reviewed and are negative.       Past Medical and Surgical History:  Breast Biopsy History:  Patient has not had a breast biopsy in the past.  Breast Cancer HIstory:  Patient does not have a past medical history of breast cancer.  Breast Operations, and year:  NONE   Obstetric/Gynecologic History:  Age menstrual periods began: 15  Patient is postmenopausal, entered menopause naturally at age:    Number of pregnancies:1   Number of live births: 1  Number of abortions or miscarriages: 0  Age of delivery of first child: 35  Patient breast fed, for the following lenth of time: 6 MONTHS   Length of time taking birth control pills: NO   Patient has never taken hormone replacement  PATIENT STILL HAS UTERUS AND OVARIES     Past Surgical History:   Procedure Laterality Date   •  SECTION     • HEMORRHOIDECTOMY       Past Medical History:   Diagnosis Date   • Abdominal pain    • Acid reflux 7/15/2016   • Anemia    • Breast pain    • Contraception    • Hemorrhoid    • History of Papanicolaou smear of cervix 2015   • HLD (hyperlipidemia) 7/15/2016   • Hypertension        Prior Hospitalizations, other than for surgery or childbirth, and year:  NONE     Social History     Social History   • Marital status:      Spouse name: N/A   • Number of children: N/A   • Years of education: N/A     Occupational History   • housewife   "    Social History Main Topics   • Smoking status: Never Smoker   • Smokeless tobacco: Never Used   • Alcohol use No   • Drug use: No   • Sexual activity: Yes     Other Topics Concern   • Not on file     Social History Narrative   • No narrative on file     Patient is .  Patient is a homemaker.  Patient does not drink caffeine.    Family History:  Family History   Problem Relation Age of Onset   • Heart attack Mother    • Hyperlipidemia Father    • Hyperlipidemia Sister    • No Known Problems Son    • Cancer Cousin        Vital Signs:  /82   Pulse 84   Ht 154.9 cm (60.98\")   Wt 55.3 kg (122 lb)   SpO2 98%   BMI 23.06 kg/m²      Medications:    Current Outpatient Prescriptions:   •  fluticasone (FLONASE) 50 MCG/ACT nasal spray, 2 sprays into each nostril Daily., Disp: 3 bottle, Rfl: 3  •  lansoprazole (PREVACID) 30 MG capsule, Take 1 capsule by mouth Daily., Disp: 90 capsule, Rfl: 3  •  loratadine (CLARITIN) 10 MG tablet, Take 1 tablet by mouth Daily., Disp: 90 tablet, Rfl: 3  •  losartan-hydrochlorothiazide (HYZAAR) 50-12.5 MG per tablet, Take 1 tablet by mouth Daily., Disp: 90 tablet, Rfl: 3  •  lovastatin (MEVACOR) 40 MG tablet, Take 1 tablet by mouth Every Night., Disp: 90 tablet, Rfl: 3  •  MedroxyPROGESTERone Acetate 150 MG/ML suspension prefilled syringe, , Disp: , Rfl:   •  Polyethylene Glycol 3350 (MIRALAX PO), Take  by mouth., Disp: , Rfl:      Allergies:  Allergies   Allergen Reactions   • No Known Drug Allergy        Physical Examination:  /82   Pulse 84   Ht 154.9 cm (60.98\")   Wt 55.3 kg (122 lb)   SpO2 98%   BMI 23.06 kg/m²   General Appearance:  Patient is in no distress.  She is well kept and has an average build.   Psychiatric:  Patient with appropriate mood and affect. Alert and oriented to self, time, and place.    Breast, RIGHT:  small sized, symmetric with the contralateral side.  Breast skin is without erythema, edema, rashes.  There are no visible abnormalities " upon inspection during the arm-raising maneuver or with hands on hips in the sitting position. There is no nipple retraction, discharge or nipple/areolar skin changes.There are no masses palpable in the sitting or supine positions. Diffusely tender to palpation bilaterally.     Breast, LEFT:  small sized, symmetric with the contralateral side.  Breast skin is without erythema, edema, rashes.  There are no visible abnormalities upon inspection during the arm-raising maneuver or with hands on hips in the sitting position. There is no nipple retraction, discharge or nipple/areolar skin changes.There are no masses palpable in the sitting or supine positions.Diffusely tender to palpation bilaterally.     Lymphatic:  There is no axillary, cervical, infraclavicular, or supraclavicular adenopathy bilaterally.  Eyes:  Pupils are round and reactive to light.  Cardiovascular:  Heart rate and rhythm are regular.  Respiratory:  Lungs are clear bilaterally with no crackles or wheezes in any lung field.  Gastrointestinal:  Abdomen is soft, nondistended, and nontender. .    Musculoskeletal:  Good strength in all 4 extremities.   There is good range of motion in both shoulders.    Skin:  No new skin lesions or rashes on the skin excluding the breast (see breast exam above).        Imagin16   LAGRANGE  REMA SCREENING MAMMO  LORI OBRIEN    Scattered fibroglandular densities.  BIRADS category 1: Negative    17 [facility]   REMA DIG MAMMO  LORI OBRIEN    Scattered fibroglandular densities. Middle third of the upper-inner quadrant of the right breast, there is an area of focal asymmetry.  Impression: Further evaluation with spot compression and possible targeted right breast sonography.  BIRADS Category 0.    17 E  RT DIAG MAMMO    LORI OBRIEN    With additional imagining there is resolution of the area of foal asymmetry in the upper inner quadrant of the right breast.  BIRADS Category 1:  Negative.    02/13/18           [facility] Bilateral Mammo Screening Tomosynthesis                        Savita Obrien    Scattered fibroglandular densities. Posterior one-third lower outer quadrant of the right breast,  There has been interval development of microcalcifications.    Impression  There are no findings suspicious for malignancy in the left breast.    Birads Category 0      3/6/18  [facility]  RT DIAG MAMMO    SAVITA OBRIEN    Unilateral right digital spot magnification. There are punctuate monomorphic scattered regional microcalcification seen in the right breast. They have a stable appearance.  BIRADS Category 2: Benign findings.        Procedures:      Assessment:   Diagnosis Plan   1. Mastodynia  Mammo Screening Digital Tomosynthesis Bilateral With CAD   2. Breast calcifications on mammogram     Breast pain, felt to be related to her changes in hormones based on her history    Plan:  Mrs. Obrien, her  and I reviewed in detail her history, symptoms, imaging, imaging reports and examination today.  She began having breast pain 6 years ago when she started her Depo-Provera for birth control.  To manage this she has been taking vitamin E 400 units daily.  She tells me that that helped some.  She stopped the Depo-Provera in November 2017.  She noticed a worsening of her breast pain starting early 2018 that was present for 2 or 3 months and has just started to improve over the past 4 weeks.  She wears a bra that is the equivalent of an underwire without the wire.  We discussed that the majority of breast pain is related to fibrocystic condition and hormonal responsiveness of the gland to hormonal fluctuations.  I do think that the fluctuations in her hormones-- related to starting the Depo-Provera and then coming off of the Depo-Provera and her present, probable perimenopausal state ( based on her symptoms) ---are causing her breast pain.  I have recommended that she increase her vitamin E to twice a day,  once in the morning and once in the evening.  I've recommended that she use a compression bra rather than the underwire equivalent that is more of a booster.  I recommend that she minimize her caffeine intake.  Her recent imaging evaluated some regional calcifications in the right breast.  Her imaging otherwise looks benign.  I will see her back in February 2019 after her scheduled screening mammogram at [facility].  We will evaluate her mammogram and also see how her breast pain has responded.  I've asked her to call in the interim with concerns or changes and we'd be happy to see her back sooner.        Kerline Mesa MD        We have spent 40 minutes in visit today, 25 in face to face .      Next Appointment:  Return for Next scheduled follow up, after imaging.      EMR Dragon/transcription disclaimer:    Much of this encounter note is an electronic transcription/translocation of spoken language to printed text.  The electronic translation of spoken language may permit erroneous, or at times, nonsensical words or phrases to be inadvertently transcribed.  Although I have reviewed the note from such areas, some may still exist.

## 2018-12-03 ENCOUNTER — TELEPHONE (OUTPATIENT)
Dept: SURGERY | Facility: CLINIC | Age: 53
End: 2018-12-03

## 2018-12-03 NOTE — TELEPHONE ENCOUNTER
Scheduled 3-D mammo for 02/14/2019 @ [facility] to arrive @ 10:15    F/U Appt. Jose Mesa on 2/21/2019 to arrive @ 1:45pm    Called and left message with pt. Pt to call back and confirm appointments    Sent patient reminder letter with appointment times

## 2019-02-14 ENCOUNTER — HOSPITAL ENCOUNTER (OUTPATIENT)
Dept: MAMMOGRAPHY | Facility: HOSPITAL | Age: 54
Discharge: HOME | End: 2019-02-14
Attending: SURGERY | Admitting: SURGERY

## 2019-02-14 DIAGNOSIS — N64.4 MASTODYNIA: ICD-10-CM

## 2019-02-14 PROCEDURE — 77063 BREAST TOMOSYNTHESIS BI: CPT

## 2019-02-14 PROCEDURE — 77067 SCR MAMMO BI INCL CAD: CPT

## 2019-02-19 ENCOUNTER — TELEPHONE (OUTPATIENT)
Dept: SURGERY | Facility: CLINIC | Age: 54
End: 2019-02-19

## 2019-02-20 NOTE — TELEPHONE ENCOUNTER
Confirmed appt change with patient through interpretor service today.   Patient voiced understanding, requested Nepali interpretor for visit with Dr. Mesa.     Contacted  [facility] Carrie Collett has help us with this. An interpretor will be present for visit with Dr. Mesa.     lml

## 2019-02-24 ENCOUNTER — TELEPHONE (OUTPATIENT)
Dept: SURGERY | Facility: CLINIC | Age: 54
End: 2019-02-24

## 2019-02-24 NOTE — TELEPHONE ENCOUNTER
Bilateral screening mammogram with 3D [facility] on 2- 14- 2019: Heterogeneously dense tissue bilaterally.  BI-RADS 2

## 2019-02-28 ENCOUNTER — TELEPHONE (OUTPATIENT)
Dept: SURGERY | Facility: CLINIC | Age: 54
End: 2019-02-28

## 2019-02-28 NOTE — TELEPHONE ENCOUNTER
Patient rescheduled appt to see Dr. Mesa Thursday 3/14/19 3:00    Confirmed with Jenifer Mayer for visit. Ranjanl

## 2019-03-07 RX ORDER — LOVASTATIN 40 MG/1
TABLET ORAL
Qty: 30 TABLET | Refills: 2 | Status: SHIPPED | OUTPATIENT
Start: 2019-03-07 | End: 2020-03-12

## 2019-03-07 RX ORDER — LOSARTAN POTASSIUM AND HYDROCHLOROTHIAZIDE 12.5; 5 MG/1; MG/1
1 TABLET ORAL DAILY
Qty: 90 TABLET | Refills: 3 | Status: SHIPPED | OUTPATIENT
Start: 2019-03-07 | End: 2020-03-12

## 2019-03-07 RX ORDER — FLUTICASONE PROPIONATE 50 MCG
SPRAY, SUSPENSION (ML) NASAL
Qty: 16 G | Refills: 2 | Status: SHIPPED | OUTPATIENT
Start: 2019-03-07 | End: 2019-03-14

## 2019-03-07 RX ORDER — LORATADINE 10 MG/1
TABLET ORAL
Qty: 90 TABLET | Refills: 2 | Status: SHIPPED | OUTPATIENT
Start: 2019-03-07 | End: 2019-03-14

## 2019-03-14 ENCOUNTER — OFFICE VISIT (OUTPATIENT)
Dept: SPORTS MEDICINE | Facility: CLINIC | Age: 54
End: 2019-03-14

## 2019-03-14 ENCOUNTER — OFFICE VISIT (OUTPATIENT)
Dept: SURGERY | Facility: CLINIC | Age: 54
End: 2019-03-14

## 2019-03-14 VITALS
HEIGHT: 61 IN | BODY MASS INDEX: 24.55 KG/M2 | SYSTOLIC BLOOD PRESSURE: 102 MMHG | HEART RATE: 86 BPM | OXYGEN SATURATION: 98 % | WEIGHT: 130 LBS | DIASTOLIC BLOOD PRESSURE: 70 MMHG

## 2019-03-14 VITALS
WEIGHT: 130.4 LBS | OXYGEN SATURATION: 98 % | DIASTOLIC BLOOD PRESSURE: 82 MMHG | SYSTOLIC BLOOD PRESSURE: 132 MMHG | HEIGHT: 61 IN | BODY MASS INDEX: 24.62 KG/M2 | HEART RATE: 96 BPM

## 2019-03-14 DIAGNOSIS — N64.4 MASTODYNIA: Primary | ICD-10-CM

## 2019-03-14 DIAGNOSIS — R92.1 BREAST CALCIFICATIONS ON MAMMOGRAM: ICD-10-CM

## 2019-03-14 DIAGNOSIS — R73.03 PREDIABETES: ICD-10-CM

## 2019-03-14 DIAGNOSIS — I10 BENIGN ESSENTIAL HTN: ICD-10-CM

## 2019-03-14 DIAGNOSIS — J30.1 SEASONAL ALLERGIC RHINITIS DUE TO POLLEN: ICD-10-CM

## 2019-03-14 DIAGNOSIS — Z00.00 ANNUAL PHYSICAL EXAM: Primary | ICD-10-CM

## 2019-03-14 DIAGNOSIS — K21.9 GASTROESOPHAGEAL REFLUX DISEASE, ESOPHAGITIS PRESENCE NOT SPECIFIED: ICD-10-CM

## 2019-03-14 DIAGNOSIS — E78.2 MIXED HYPERLIPIDEMIA: ICD-10-CM

## 2019-03-14 PROCEDURE — 99396 PREV VISIT EST AGE 40-64: CPT | Performed by: FAMILY MEDICINE

## 2019-03-14 PROCEDURE — 99213 OFFICE O/P EST LOW 20 MIN: CPT | Performed by: SURGERY

## 2019-03-14 RX ORDER — TRIAMCINOLONE ACETONIDE 55 UG/1
2 SPRAY, METERED NASAL DAILY
Qty: 1 BOTTLE | Refills: 11 | Status: SHIPPED | OUTPATIENT
Start: 2019-03-14 | End: 2020-08-03 | Stop reason: SDUPTHER

## 2019-03-14 RX ORDER — CETIRIZINE HYDROCHLORIDE 10 MG/1
10 TABLET ORAL DAILY
Qty: 30 TABLET | Refills: 11 | Status: SHIPPED | OUTPATIENT
Start: 2019-03-14 | End: 2020-08-03

## 2019-03-14 NOTE — PROGRESS NOTES
Chief Complaint: Savita Lucio is a 53 y.o. female who was seen in consultation at the request of No ref. provider found  for breast pain    History of Present Illness:  Patient presents with breast pain. She noted no new masses, skin changes, nipple discharge, nipple changes prior to her most recent imaging. Paino started 6 years ago when she started the depo provera and is described as a burning pain whole breast, behind nipples, worse when touched.  She stopped the depo provera in 2017 and the pain has been worse since the new year, February through April and starting May has improved somewhat.  She wears a bra most of the time.  SHe takes vitamin E, 400 units once daily and has taken this since she first started having the pain 6 years ago with starting the depo.    Her most recent imaging includes the followin/30/17 [facility]   REMA DIG SAVITA CHILD  Scattered fibroglandular densities. Middle third of the upper-inner quadrant of the right breast, there is an area of focal asymmetry.  Impression: Further evaluation with spot compression and possible targeted right breast sonography.  BIRADS Category 0.    17 E  RT DIAG SAVITA CHILD    With additional imagining there is resolution of the area of foal asymmetry in the upper inner quadrant of the right breast.  BIRADS Category 1: Negative.    18           [facility] Bilateral Mammo Screening Tomosynthesis                        Savita Lucio  Scattered fibroglandular densities. Posterior one-third lower outer quadrant of the right breast,  There has been interval development of microcalcifications.  Impression  There are no findings suspicious for malignancy in the left breast.  Birads Category 0      3/6/18  E  RT DIAG SAVITA CHILD  Unilateral right digital spot magnification. There are punctuate monomorphic scattered regional microcalcification seen in the right breast. They have a stable  appearance.  BIRADS Category 2: Benign findings.    She has not had a breast biopsy in the past.  She has her uterus and ovaries, is ? perimenopausal, and takes nor hormones. She stopped the depo in November and tells me that she had a little spoting in March and in May. No other.    Her family history includes the following: She has no daughters, 4 sisters, 4 maternal aunts, 4 paternal aunts.  No breast or ovarian cancer in her family.    She drinks an occasional coffee and an occasional cola.      Interval HIstory:  In the interim,  Savita Lucio  has done well.  She has noted no changes in her breast exam. No new masses, skin changes, nipple changes, nipple discharge either breast.     She tells me that she has no breast pain for weeks and then may have excruciating pain for a few days.    She reports not drinking caffeine. She reports significant stress. SHe reports spotty periods some months for 2-3 days and then no periods for months.    Her most recent imaging includes the following:  Bilateral screening mammogram with [facility]D [facility] on 2- 14- 2019: Heterogeneously dense tissue bilaterally.  BI-RADS 2    Review of Systems:  Review of Systems   Constitutional: Positive for unexpected weight change (8lb disha gain).   All other systems reviewed and are negative.       Past Medical and Surgical History:  Breast Biopsy History:  Patient has not had a breast biopsy in the past.  Breast Cancer HIstory:  Patient does not have a past medical history of breast cancer.  Breast Operations, and year:  NONE   Obstetric/Gynecologic History:  Age menstrual periods began: 15  Patient is postmenopausal, entered menopause naturally at age:    Number of pregnancies:1   Number of live births: 1  Number of abortions or miscarriages: 0  Age of delivery of first child: 35  Patient breast fed, for the following lenth of time: 6 MONTHS   Length of time taking birth control pills: NO   Patient has never taken hormone replacement  PATIENT  "STILL HAS UTERUS AND OVARIES     Past Surgical History:   Procedure Laterality Date   •  SECTION     • HEMORRHOIDECTOMY       Past Medical History:   Diagnosis Date   • Abdominal pain    • Acid reflux 7/15/2016   • Anemia    • Breast pain    • Contraception    • Hemorrhoid    • History of Papanicolaou smear of cervix 2015   • HLD (hyperlipidemia) 7/15/2016   • Hypertension        Prior Hospitalizations, other than for surgery or childbirth, and year:  NONE     Social History     Socioeconomic History   • Marital status:      Spouse name: Not on file   • Number of children: Not on file   • Years of education: Not on file   • Highest education level: Not on file   Social Needs   • Financial resource strain: Not on file   • Food insecurity - worry: Not on file   • Food insecurity - inability: Not on file   • Transportation needs - medical: Not on file   • Transportation needs - non-medical: Not on file   Occupational History   • Occupation: housewife   Tobacco Use   • Smoking status: Never Smoker   • Smokeless tobacco: Never Used   Substance and Sexual Activity   • Alcohol use: No   • Drug use: No   • Sexual activity: Yes   Other Topics Concern   • Not on file   Social History Narrative   • Not on file     Patient is .  Patient is a homemaker.  Patient does not drink caffeine.    Family History:  Family History   Problem Relation Age of Onset   • Heart attack Mother    • Hyperlipidemia Father    • Hyperlipidemia Sister    • No Known Problems Son    • Cervical cancer Cousin        Vital Signs:  /82 (BP Location: Left arm, Patient Position: Sitting, Cuff Size: Adult)   Pulse 96   Ht 155 cm (61.02\")   Wt 59.1 kg (130 lb 6.4 oz)   SpO2 98%   Breastfeeding? No   BMI 24.62 kg/m²      Medications:    Current Outpatient Medications:   •  cetirizine (zyrTEC) 10 MG tablet, Take 1 tablet by mouth Daily., Disp: 30 tablet, Rfl: 11  •  lansoprazole (PREVACID) 30 MG capsule, Take 1 capsule by " "mouth Daily., Disp: 90 capsule, Rfl: 3  •  losartan-hydrochlorothiazide (HYZAAR) 50-12.5 MG per tablet, Take 1 tablet by mouth Daily., Disp: 90 tablet, Rfl: 3  •  lovastatin (MEVACOR) 40 MG tablet, TAKE ONE TABLET BY MOUTH ONCE NIGHTLY, Disp: 30 tablet, Rfl: 2  •  Polyethylene Glycol 3350 (MIRALAX PO), Take  by mouth., Disp: , Rfl:   •  Triamcinolone Acetonide (NASACORT) 55 MCG/ACT nasal inhaler, 2 sprays into the nostril(s) as directed by provider Daily., Disp: 1 bottle, Rfl: 11  •  vitamin E 100 UNIT capsule, Take 100 Units by mouth Daily., Disp: , Rfl:      Allergies:  Allergies   Allergen Reactions   • No Known Drug Allergy        Physical Examination:  /82 (BP Location: Left arm, Patient Position: Sitting, Cuff Size: Adult)   Pulse 96   Ht 155 cm (61.02\")   Wt 59.1 kg (130 lb 6.4 oz)   SpO2 98%   Breastfeeding? No   BMI 24.62 kg/m²   General Appearance:  Patient is in no distress.  She is well kept and has an average build.   Psychiatric:  Patient with appropriate mood and affect. Alert and oriented to self, time, and place.    Breast, RIGHT:  small sized, symmetric with the contralateral side.  Breast skin is without erythema, edema, rashes.  There are no visible abnormalities upon inspection during the arm-raising maneuver or with hands on hips in the sitting position. There is no nipple retraction, discharge or nipple/areolar skin changes.There are no masses palpable in the sitting or supine positions. Dense glandular tissue that is diffusely tender to palpation bilaterally.     Breast, LEFT:  small sized, symmetric with the contralateral side.  Breast skin is without erythema, edema, rashes.  There are no visible abnormalities upon inspection during the arm-raising maneuver or with hands on hips in the sitting position. There is no nipple retraction, discharge or nipple/areolar skin changes.There are no masses palpable in the sitting or supine positions. Dense glandular tissue that is diffusely " tender to palpation bilaterally.     Lymphatic:  There is no axillary, cervical, infraclavicular, or supraclavicular adenopathy bilaterally.  Eyes:  Pupils are round and reactive to light.  Cardiovascular:  Heart rate and rhythm are regular.  Respiratory:  Lungs are clear bilaterally with no crackles or wheezes in any lung field.  Gastrointestinal:  Abdomen is soft, nondistended, and nontender. .    Musculoskeletal:  Good strength in all 4 extremities.   There is good range of motion in both shoulders.    Skin:  No new skin lesions or rashes on the skin excluding the breast (see breast exam above).      Imagin16   LAGRANGE  REMA SCREENING MAMMO  SAVITA OBRIEN  Scattered fibroglandular densities.  BIRADS category 1: Negative    17 [facility]   REMA DIG MAMMO  SAVITA OBRIEN  Scattered fibroglandular densities. Middle third of the upper-inner quadrant of the right breast, there is an area of focal asymmetry.  Impression: Further evaluation with spot compression and possible targeted right breast sonography.  BIRADS Category 0.    17 [facility]  RT DIAG MAMMO    SAVITA OBRIEN  With additional imagining there is resolution of the area of foal asymmetry in the upper inner quadrant of the right breast.  BIRADS Category 1: Negative.    18           [facility] Bilateral Mammo Screening Tomosynthesis                        Savita Obrien  Scattered fibroglandular densities. Posterior one-third lower outer quadrant of the right breast,  There has been interval development of microcalcifications.  Impression  There are no findings suspicious for malignancy in the left breast.  Birads Category 0      3/6/18  [facility]  RT DIAG MAMMO    SAVITA OBRIEN  Unilateral right digital spot magnification. There are punctuate monomorphic scattered regional microcalcification seen in the right breast. They have a stable appearance.  BIRADS Category 2: Benign findings.    Bilateral screening mammogram with [facility]D [facility] on   2019: Heterogeneously dense tissue bilaterally.  BI-RADS 2        Procedures:      Assessment:   Diagnosis Plan   1. Mastodynia     2. Breast calcifications on mammogram        Breast pain, felt to be related to hormonal fluctuations- perimenopause and stress    Plan:  Mrs. Lucio, the  and I reviewed in detail her history, symptoms, imaging, imaging reports and examination today.    She has periods with no pain in the breast for weeks and then excruciating pain for days.  She has periods that are just as irregular and I feel that this is all related to her perimenopausal hormonal changes.    I recommended vitamin E BID as well as a supportive bra and we showed her an example of a non-underwire bra with front closure and adjustable straps.    Today she is wearing a push up bra rather than a compression bra.    Previously we reviewed in detail her history:  She began having breast pain 6 years ago when she started her Depo-Provera for birth control.    She stopped the Depo-Provera in November 2017.  She noticed a worsening of her breast pain starting early 2018 that was present for 2 or 3 months and has just started to improve over the past 4 weeks.    Her most recent mammogram shows no abnormal calcifications.  We reviewed this report together today.  We discussed that her next bilateral screening mammogram is due February 16, [facility].  I asked her to call in the future with any concerns on her exam or imaging and we are happy to see her back.  I encouraged her to perform monthly self breast exams as well as a clinical breast exam and mammogram.      Kerline Mesa MD        We have spent 20 minutes in visit today, 15 in face to face .      Next Appointment:  Return for any future concerns.      EMR Dragon/transcription disclaimer:    Much of this encounter note is an electronic transcription/translocation of spoken language to printed text.  The electronic translation of  spoken language may permit erroneous, or at times, nonsensical words or phrases to be inadvertently transcribed.  Although I have reviewed the note from such areas, some may still exist.

## 2019-03-14 NOTE — PROGRESS NOTES
"Savita Lucio is here today for an annual physical exam.     Eating a healthy diet. Exercising intermittently.   C/o intermittent uncontrolled allergy symptoms.   GERD stable with prevacid.  Questions if she needs same dose meds for BP and HLD.     PHQ-2 Depression Screening  Little interest or pleasure in doing things? 0   Feeling down, depressed, or hopeless? 0   PHQ-2 Total Score 0       Health Maintenance   Topic Date Due   • DXA SCAN  04/07/2017   • ZOSTER VACCINE (1 of 2) 06/16/2017   • LIPID PANEL  02/27/2019   • ANNUAL PHYSICAL  02/28/2019   • INFLUENZA VACCINE  04/01/2019 (Originally 8/1/2018)   • TDAP/TD VACCINES (1 - Tdap) 04/01/2019 (Originally 6/16/1986)   • MAMMOGRAM  02/14/2020   • PAP SMEAR  03/20/2021   • COLONOSCOPY  04/30/2028       Review of Systems   Constitutional: Negative.    HENT: Positive for congestion.    Respiratory: Negative.    Cardiovascular: Negative.    Gastrointestinal: Negative.    Psychiatric/Behavioral: Negative.        /70   Pulse 86   Ht 155 cm (61.02\")   Wt 59 kg (130 lb)   SpO2 98%   BMI 24.54 kg/m²      Physical Exam    Vital signs reviewed.  General appearance: No acute distress  Eyes: conjunctiva clear without erythema; pupils equally round and reactive  ENT: external ears and nose normal; hearing normal, oropharynx clear  *Notable postnasal drainage signs as well as right worse than left turbinate edema    Neck: supple; no thyromegaly  CV: normal rate and rhythm; no peripheral edema  Respiratory: normal respiratory effort; lungs clear to auscultation bilaterally  MSK: normal gait and station; no focal joint deformity or swelling  Skin: no rash or wounds; normal turgor  Neuro: cranial nerves 2-12 grossly intact; normal sensation to light touch  Psych: mood and affect normal; recent and remote memory intact      Current Outpatient Medications:   •  lansoprazole (PREVACID) 30 MG capsule, Take 1 capsule by mouth Daily., Disp: 90 capsule, Rfl: 3  •  " losartan-hydrochlorothiazide (HYZAAR) 50-12.5 MG per tablet, Take 1 tablet by mouth Daily., Disp: 90 tablet, Rfl: 3  •  lovastatin (MEVACOR) 40 MG tablet, TAKE ONE TABLET BY MOUTH ONCE NIGHTLY, Disp: 30 tablet, Rfl: 2  •  MedroxyPROGESTERone Acetate 150 MG/ML suspension prefilled syringe, , Disp: , Rfl:   •  Polyethylene Glycol 3350 (MIRALAX PO), Take  by mouth., Disp: , Rfl:   •  vitamin E 100 UNIT capsule, Take 100 Units by mouth Daily., Disp: , Rfl:   •  cetirizine (zyrTEC) 10 MG tablet, Take 1 tablet by mouth Daily., Disp: 30 tablet, Rfl: 11  •  Triamcinolone Acetonide (NASACORT) 55 MCG/ACT nasal inhaler, 2 sprays into the nostril(s) as directed by provider Daily., Disp: 1 bottle, Rfl: 11    Savita was seen today for annual exam and uri.    Diagnoses and all orders for this visit:    Annual physical exam  -     CBC & Differential  -     Comprehensive Metabolic Panel  -     Lipid Panel With / Chol / HDL Ratio  -     Thyroid Cascade Profile  -     Urinalysis With Culture If Indicated - Urine, Clean Catch  -     Hemoglobin A1c  -     DEXA Bone Density Axial; Future    Benign essential HTN    Mixed hyperlipidemia  -     Lipid Panel With / Chol / HDL Ratio    Seasonal allergic rhinitis due to pollen    Gastroesophageal reflux disease, esophagitis presence not specified    Prediabetes  -     Hemoglobin A1c    Other orders  -     cetirizine (zyrTEC) 10 MG tablet; Take 1 tablet by mouth Daily.  -     Triamcinolone Acetonide (NASACORT) 55 MCG/ACT nasal inhaler; 2 sprays into the nostril(s) as directed by provider Daily.        Encourage healthy diet and exercise.  Encourage patient to stay up to date on screening examinations as indicated based on age and risk factors.  We will try to control her allergies better with change of Zyrtec and Nasacort.  If not working well she needs to consider meeting with an allergist instead.      EMR Dragon/Transcription disclaimer:    Much of this encounter note is an electronic  transcription/translation of spoken language to printed text.  The electronic translation of spoken language may permit erroneous, or at times, nonsensical words or phrases to be inadvertently transcribed.  Although I have reviewed the note for such errors some may still exist.

## 2019-03-15 LAB
ALBUMIN SERPL-MCNC: 4.4 G/DL (ref 3.5–5.2)
ALBUMIN/GLOB SERPL: 1.8 G/DL
ALP SERPL-CCNC: 42 U/L (ref 39–117)
ALT SERPL-CCNC: 13 U/L (ref 1–33)
APPEARANCE UR: CLEAR
AST SERPL-CCNC: 13 U/L (ref 1–32)
BACTERIA #/AREA URNS HPF: NORMAL /HPF
BASOPHILS # BLD AUTO: 0.04 10*3/MM3 (ref 0–0.2)
BASOPHILS NFR BLD AUTO: 0.5 % (ref 0–1.5)
BILIRUB SERPL-MCNC: 0.3 MG/DL (ref 0.1–1.2)
BILIRUB UR QL STRIP: NEGATIVE
BUN SERPL-MCNC: 11 MG/DL (ref 6–20)
BUN/CREAT SERPL: 16.7 (ref 7–25)
CALCIUM SERPL-MCNC: 9.2 MG/DL (ref 8.6–10.5)
CHLORIDE SERPL-SCNC: 101 MMOL/L (ref 98–107)
CHOLEST SERPL-MCNC: 145 MG/DL (ref 0–200)
CHOLEST/HDLC SERPL: 3.45 {RATIO}
CO2 SERPL-SCNC: 25.9 MMOL/L (ref 22–29)
COLOR UR: YELLOW
CREAT SERPL-MCNC: 0.66 MG/DL (ref 0.57–1)
EOSINOPHIL # BLD AUTO: 0.12 10*3/MM3 (ref 0–0.4)
EOSINOPHIL NFR BLD AUTO: 1.6 % (ref 0.3–6.2)
EPI CELLS #/AREA URNS HPF: NORMAL /HPF
ERYTHROCYTE [DISTWIDTH] IN BLOOD BY AUTOMATED COUNT: 14.9 % (ref 12.3–15.4)
GLOBULIN SER CALC-MCNC: 2.5 GM/DL
GLUCOSE SERPL-MCNC: 91 MG/DL (ref 65–99)
GLUCOSE UR QL: NEGATIVE
HBA1C MFR BLD: 5.4 % (ref 4.8–5.6)
HCT VFR BLD AUTO: 41.1 % (ref 34–46.6)
HDLC SERPL-MCNC: 42 MG/DL (ref 40–60)
HGB BLD-MCNC: 12.8 G/DL (ref 12–15.9)
HGB UR QL STRIP: NEGATIVE
IMM GRANULOCYTES # BLD AUTO: 0.02 10*3/MM3 (ref 0–0.05)
IMM GRANULOCYTES NFR BLD AUTO: 0.3 % (ref 0–0.5)
KETONES UR QL STRIP: NEGATIVE
LDLC SERPL CALC-MCNC: 80 MG/DL (ref 0–100)
LEUKOCYTE ESTERASE UR QL STRIP: NEGATIVE
LYMPHOCYTES # BLD AUTO: 2.31 10*3/MM3 (ref 0.7–3.1)
LYMPHOCYTES NFR BLD AUTO: 30.9 % (ref 19.6–45.3)
MCH RBC QN AUTO: 24.4 PG (ref 26.6–33)
MCHC RBC AUTO-ENTMCNC: 31.1 G/DL (ref 31.5–35.7)
MCV RBC AUTO: 78.3 FL (ref 79–97)
MICRO URNS: NORMAL
MICRO URNS: NORMAL
MONOCYTES # BLD AUTO: 0.7 10*3/MM3 (ref 0.1–0.9)
MONOCYTES NFR BLD AUTO: 9.4 % (ref 5–12)
NEUTROPHILS # BLD AUTO: 4.29 10*3/MM3 (ref 1.4–7)
NEUTROPHILS NFR BLD AUTO: 57.3 % (ref 42.7–76)
NITRITE UR QL STRIP: NEGATIVE
NRBC BLD AUTO-RTO: 0 /100 WBC (ref 0–0)
PH UR STRIP: 7 [PH] (ref 5–7.5)
PLATELET # BLD AUTO: 306 10*3/MM3 (ref 140–450)
POTASSIUM SERPL-SCNC: 3.7 MMOL/L (ref 3.5–5.2)
PROT SERPL-MCNC: 6.9 G/DL (ref 6–8.5)
PROT UR QL STRIP: NEGATIVE
RBC # BLD AUTO: 5.25 10*6/MM3 (ref 3.77–5.28)
RBC #/AREA URNS HPF: NORMAL /HPF
SODIUM SERPL-SCNC: 139 MMOL/L (ref 136–145)
SP GR UR: 1.01 (ref 1–1.03)
TRIGL SERPL-MCNC: 115 MG/DL (ref 0–150)
TSH SERPL DL<=0.005 MIU/L-ACNC: 1.83 UIU/ML (ref 0.45–4.5)
URINALYSIS REFLEX: NORMAL
UROBILINOGEN UR STRIP-MCNC: 0.2 MG/DL (ref 0.2–1)
VLDLC SERPL CALC-MCNC: 23 MG/DL (ref 5–40)
WBC # BLD AUTO: 7.48 10*3/MM3 (ref 3.4–10.8)
WBC #/AREA URNS HPF: NORMAL /HPF

## 2019-03-20 ENCOUNTER — TELEPHONE (OUTPATIENT)
Dept: SPORTS MEDICINE | Facility: CLINIC | Age: 54
End: 2019-03-20

## 2019-03-29 ENCOUNTER — APPOINTMENT (OUTPATIENT)
Dept: BONE DENSITY | Facility: HOSPITAL | Age: 54
End: 2019-03-29

## 2019-07-05 ENCOUNTER — OFFICE VISIT (OUTPATIENT)
Dept: SPORTS MEDICINE | Facility: CLINIC | Age: 54
End: 2019-07-05

## 2019-07-05 VITALS
DIASTOLIC BLOOD PRESSURE: 78 MMHG | OXYGEN SATURATION: 95 % | HEART RATE: 102 BPM | WEIGHT: 124 LBS | HEIGHT: 61 IN | SYSTOLIC BLOOD PRESSURE: 104 MMHG | BODY MASS INDEX: 23.41 KG/M2

## 2019-07-05 DIAGNOSIS — L20.84 INTRINSIC ATOPIC DERMATITIS: Primary | ICD-10-CM

## 2019-07-05 PROCEDURE — 99213 OFFICE O/P EST LOW 20 MIN: CPT | Performed by: FAMILY MEDICINE

## 2019-07-05 RX ORDER — TRIAMCINOLONE ACETONIDE 1 MG/G
CREAM TOPICAL 3 TIMES DAILY
Qty: 80 G | Refills: 2 | Status: SHIPPED | OUTPATIENT
Start: 2019-07-05 | End: 2020-08-04

## 2019-07-05 NOTE — PROGRESS NOTES
"Savita is a 54 y.o. year old female evaluation of a problem that is new to this examiner.    Chief Complaint   Patient presents with   • Irritation     states that she has a skin irritation, mostly on her elbow area, she states that they burn. skin also gets very dry.       History of Present Illness   HPI   Here today for skin irritation, dryness on both arms and legs, coming and going over the past several weeks.  Worse last week after sun exposure.  Mild to moderate severity generalized itching.  Some benefit from over-the-counter eczema cream.  Also notes her nails seem to be more brittle.    I have reviewed the patient's medical, family, and social history in detail and updated the computerized patient record.    Review of Systems   Constitutional: Negative.    Respiratory: Negative.        /78 (BP Location: Left arm, Patient Position: Sitting, Cuff Size: Adult)   Pulse 102   Ht 155 cm (61.02\")   Wt 56.2 kg (124 lb)   SpO2 95%   BMI 23.41 kg/m²      Physical Exam   Constitutional: She appears well-developed and well-nourished.   Skin:   Erythema with dryness in bilateral arms in the lateral and antecubital regions.  Similar but milder in the medial knees   Psychiatric: She has a normal mood and affect.   Vitals reviewed.        Current Outpatient Medications:   •  cetirizine (zyrTEC) 10 MG tablet, Take 1 tablet by mouth Daily., Disp: 30 tablet, Rfl: 11  •  lansoprazole (PREVACID) 30 MG capsule, Take 1 capsule by mouth Daily., Disp: 90 capsule, Rfl: 3  •  losartan-hydrochlorothiazide (HYZAAR) 50-12.5 MG per tablet, Take 1 tablet by mouth Daily., Disp: 90 tablet, Rfl: 3  •  lovastatin (MEVACOR) 40 MG tablet, TAKE ONE TABLET BY MOUTH ONCE NIGHTLY, Disp: 30 tablet, Rfl: 2  •  Polyethylene Glycol 3350 (MIRALAX PO), Take  by mouth., Disp: , Rfl:   •  Triamcinolone Acetonide (NASACORT) 55 MCG/ACT nasal inhaler, 2 sprays into the nostril(s) as directed by provider Daily., Disp: 1 bottle, Rfl: 11  •  vitamin " E 100 UNIT capsule, Take 100 Units by mouth Daily., Disp: , Rfl:   •  triamcinolone (KENALOG) 0.1 % cream, Apply  topically to the appropriate area as directed 3 (Three) Times a Day., Disp: 80 g, Rfl: 2     Diagnoses and all orders for this visit:    Intrinsic atopic dermatitis  -     triamcinolone (KENALOG) 0.1 % cream; Apply  topically to the appropriate area as directed 3 (Three) Times a Day.       Discussed skin care for atopic dermatitis.  If not improving we can consider Derm consult.      EMR Dragon/Transcription disclaimer:    Much of this encounter note is an electronic transcription/translation of spoken language to printed text.  The electronic translation of spoken language may permit erroneous, or at times, nonsensical words or phrases to be inadvertently transcribed.  Although I have reviewed the note for such errors some may still exist.

## 2019-07-09 ENCOUNTER — OFFICE VISIT (OUTPATIENT)
Dept: OBSTETRICS AND GYNECOLOGY | Facility: CLINIC | Age: 54
End: 2019-07-09

## 2019-07-09 VITALS
BODY MASS INDEX: 23.75 KG/M2 | DIASTOLIC BLOOD PRESSURE: 82 MMHG | WEIGHT: 125.8 LBS | SYSTOLIC BLOOD PRESSURE: 122 MMHG | HEIGHT: 61 IN

## 2019-07-09 DIAGNOSIS — Z11.51 SPECIAL SCREENING EXAMINATION FOR HUMAN PAPILLOMAVIRUS (HPV): ICD-10-CM

## 2019-07-09 DIAGNOSIS — Z01.419 WELL FEMALE EXAM WITH ROUTINE GYNECOLOGICAL EXAM: Primary | ICD-10-CM

## 2019-07-09 DIAGNOSIS — N64.4 MASTODYNIA: ICD-10-CM

## 2019-07-09 LAB
BILIRUB BLD-MCNC: NEGATIVE MG/DL
CLARITY, POC: CLEAR
COLOR UR: YELLOW
GLUCOSE UR STRIP-MCNC: NEGATIVE MG/DL
KETONES UR QL: NEGATIVE
LEUKOCYTE EST, POC: NEGATIVE
NITRITE UR-MCNC: NEGATIVE MG/ML
PH UR: 5 [PH] (ref 5–8)
PROT UR STRIP-MCNC: NEGATIVE MG/DL
RBC # UR STRIP: NEGATIVE /UL
SP GR UR: 1 (ref 1–1.03)
UROBILINOGEN UR QL: NORMAL

## 2019-07-09 PROCEDURE — 99396 PREV VISIT EST AGE 40-64: CPT | Performed by: OBSTETRICS & GYNECOLOGY

## 2019-07-09 PROCEDURE — 81002 URINALYSIS NONAUTO W/O SCOPE: CPT | Performed by: OBSTETRICS & GYNECOLOGY

## 2019-07-09 NOTE — PROGRESS NOTES
GYN Annual Exam     CC- Here for annual exam.     Savita Lucio is a 54 y.o. female who presents for annual well woman exam. Periods are irregular, lasting 4-5 days. Dysmenorrhea:none. Vasomotor symptoms: intermittently at night. Pt has no hx of HRT use. Pt still with bilateral breast pain. Sees Dr Mesa and her last visit was 3/2019. MMG UTD. Colonoscopy UTD    OB History      Para Term  AB Living    1 1 1 0 0 1    SAB TAB Ectopic Molar Multiple Live Births    0 0 0   0 1          Current contraception: none  History of abnormal Pap smear: no  History of abnormal mammogram: no  Family history of uterine, colon or ovarian cancer: no  Family history of breast cancer: no    Health Maintenance   Topic Date Due   • TDAP/TD VACCINES (1 - Tdap) 1986   • DXA SCAN  2017   • ZOSTER VACCINE (1 of 2) 2017   • INFLUENZA VACCINE  2019   • MAMMOGRAM  2020   • LIPID PANEL  2020   • ANNUAL PHYSICAL  03/15/2020   • ANNUAL GYN EXAM  07/10/2020   • PAP SMEAR  2021   • COLONOSCOPY  2028       Past Medical History:   Diagnosis Date   • Abdominal pain    • Acid reflux 7/15/2016   • Anemia    • Breast pain    • Contraception    • Hemorrhoid    • History of Papanicolaou smear of cervix 2015   • HLD (hyperlipidemia) 7/15/2016   • Hypertension        Past Surgical History:   Procedure Laterality Date   •  SECTION     • HEMORRHOIDECTOMY           Current Outpatient Medications:   •  cetirizine (zyrTEC) 10 MG tablet, Take 1 tablet by mouth Daily., Disp: 30 tablet, Rfl: 11  •  lansoprazole (PREVACID) 30 MG capsule, Take 1 capsule by mouth Daily., Disp: 90 capsule, Rfl: 3  •  losartan-hydrochlorothiazide (HYZAAR) 50-12.5 MG per tablet, Take 1 tablet by mouth Daily., Disp: 90 tablet, Rfl: 3  •  lovastatin (MEVACOR) 40 MG tablet, TAKE ONE TABLET BY MOUTH ONCE NIGHTLY, Disp: 30 tablet, Rfl: 2  •  Polyethylene Glycol 3350 (MIRALAX PO), Take  by mouth., Disp: , Rfl:   •   "triamcinolone (KENALOG) 0.1 % cream, Apply  topically to the appropriate area as directed 3 (Three) Times a Day., Disp: 80 g, Rfl: 2  •  Triamcinolone Acetonide (NASACORT) 55 MCG/ACT nasal inhaler, 2 sprays into the nostril(s) as directed by provider Daily., Disp: 1 bottle, Rfl: 11  •  vitamin E 100 UNIT capsule, Take 100 Units by mouth Daily., Disp: , Rfl:     Allergies   Allergen Reactions   • No Known Drug Allergy        Social History     Tobacco Use   • Smoking status: Never Smoker   • Smokeless tobacco: Never Used   Substance Use Topics   • Alcohol use: No   • Drug use: No       Family History   Problem Relation Age of Onset   • Heart attack Mother    • Hyperlipidemia Father    • Hyperlipidemia Sister    • No Known Problems Son    • Cervical cancer Cousin        Review of Systems   Constitutional: Negative for unexpected weight change.   Gastrointestinal: Negative for abdominal distention, abdominal pain, anal bleeding, blood in stool, constipation and diarrhea.   Genitourinary: Positive for menstrual problem. Negative for dyspareunia, pelvic pain, vaginal bleeding, vaginal discharge and vaginal pain.       /82   Ht 154.9 cm (61\")   Wt 57.1 kg (125 lb 12.8 oz)   LMP 05/25/2019   BMI 23.77 kg/m²     Physical Exam   Constitutional: She is oriented to person, place, and time. She appears well-developed and well-nourished.   HENT:   Mouth/Throat: Normal dentition. No dental caries.   Cardiovascular: Normal rate and regular rhythm.   Pulmonary/Chest: Effort normal and breath sounds normal. Right breast exhibits no inverted nipple, no mass, no nipple discharge, no skin change and no tenderness. Left breast exhibits no inverted nipple, no mass, no nipple discharge, no skin change and no tenderness.   Abdominal: Soft. She exhibits no distension and no mass. There is no tenderness.   Genitourinary: There is no rash, tenderness or lesion on the right labia. There is no rash, tenderness or lesion on the left " labia. Uterus is not deviated, not enlarged, not fixed and not tender. Cervix exhibits no motion tenderness, no discharge and no friability. Right adnexum displays no mass, no tenderness and no fullness. Left adnexum displays no mass, no tenderness and no fullness. No tenderness or bleeding in the vagina. No vaginal discharge found.   Neurological: She is alert and oriented to person, place, and time.   Psychiatric: She has a normal mood and affect. Her behavior is normal. Judgment and thought content normal.   Vitals reviewed.         Assessment/Plan    1) GYN HM: Check pap smear. MMG done 2/14/2019. SBE demonstrated and encouraged. Colonoscopy UTD.  2) Bilateral mastodynia: pt has had negative imaging. She has been seeing Dr Mesa for pt;s sx. She was last seen 3/2019. Dr Mesa thinks her breast pain is related to her hormonal fluctuations. Pt has intermittent exacerbations of her breast pain. Recommended that pt take Vitamin E bid and to wear a suppportive bra with no underwire. No fu recommended unless symptoms worsen or MMG abnormal.  3) Bone health - Weight bearing exercise, dietary calcium recommendations and vitamin D reviewed.   4) Diet and Exercise discussed  5) Smoking Status: nonsmoker  6) Perimenopause: pt is having irregular menstrual cycles.  She is also having intermittent vasomotor symptoms.  We discussed HRT but with the patient's mastodynia it is most likely better to not begin HRT so that there is no exacerbation of her breast pain.  Patient's vasomotor symptoms are only intermittent and mostly at night and she agrees with plan.  7) Follow up prn and 1 year       Diagnoses and all orders for this visit:    Well female exam with routine gynecological exam  -     Pap IG, HPV-hr  -     POC Urinalysis Dipstick    Special screening examination for human papillomavirus (HPV)  -     Pap IG, HPV-hr    Mastodynia        Alina Piper,   7/11/2019  8:24 PM

## 2019-07-11 PROBLEM — N95.1 PERIMENOPAUSE: Status: ACTIVE | Noted: 2019-07-11

## 2019-07-11 LAB
CYTOLOGIST CVX/VAG CYTO: NORMAL
CYTOLOGY CVX/VAG DOC CYTO: NORMAL
CYTOLOGY CVX/VAG DOC THIN PREP: NORMAL
DX ICD CODE: NORMAL
HIV 1 & 2 AB SER-IMP: NORMAL
HPV I/H RISK 1 DNA CVX QL PROBE+SIG AMP: NEGATIVE
OTHER STN SPEC: NORMAL
STAT OF ADQ CVX/VAG CYTO-IMP: NORMAL

## 2020-01-06 ENCOUNTER — TRANSCRIBE ORDERS (OUTPATIENT)
Dept: ADMINISTRATIVE | Facility: HOSPITAL | Age: 55
End: 2020-01-06

## 2020-01-06 DIAGNOSIS — Z12.31 BREAST CANCER SCREENING BY MAMMOGRAM: Primary | ICD-10-CM

## 2020-02-20 ENCOUNTER — HOSPITAL ENCOUNTER (OUTPATIENT)
Dept: MAMMOGRAPHY | Facility: HOSPITAL | Age: 55
Discharge: HOME | End: 2020-02-20
Admitting: OBSTETRICS & GYNECOLOGY

## 2020-02-20 DIAGNOSIS — Z12.31 BREAST CANCER SCREENING BY MAMMOGRAM: ICD-10-CM

## 2020-02-20 PROCEDURE — 77067 SCR MAMMO BI INCL CAD: CPT

## 2020-02-20 PROCEDURE — 77063 BREAST TOMOSYNTHESIS BI: CPT

## 2020-02-25 DIAGNOSIS — R92.8 ABNORMAL MAMMOGRAM: Primary | ICD-10-CM

## 2020-03-06 ENCOUNTER — HOSPITAL ENCOUNTER (OUTPATIENT)
Dept: MAMMOGRAPHY | Facility: HOSPITAL | Age: 55
Discharge: HOME | End: 2020-03-06
Admitting: OBSTETRICS & GYNECOLOGY

## 2020-03-06 ENCOUNTER — HOSPITAL ENCOUNTER (OUTPATIENT)
Dept: ULTRASOUND IMAGING | Facility: HOSPITAL | Age: 55
Discharge: HOME | End: 2020-03-06

## 2020-03-06 DIAGNOSIS — R92.8 ABNORMAL MAMMOGRAM: ICD-10-CM

## 2020-03-06 PROCEDURE — 77065 DX MAMMO INCL CAD UNI: CPT

## 2020-03-06 PROCEDURE — 76642 ULTRASOUND BREAST LIMITED: CPT

## 2020-03-10 DIAGNOSIS — R92.8 ABNORMAL MAMMOGRAM: Primary | ICD-10-CM

## 2020-03-12 RX ORDER — LOSARTAN POTASSIUM AND HYDROCHLOROTHIAZIDE 12.5; 5 MG/1; MG/1
TABLET ORAL
Qty: 30 TABLET | Refills: 3 | Status: SHIPPED | OUTPATIENT
Start: 2020-03-12 | End: 2020-08-03 | Stop reason: SDUPTHER

## 2020-03-12 RX ORDER — LOVASTATIN 40 MG/1
TABLET ORAL
Qty: 30 TABLET | Refills: 3 | Status: SHIPPED | OUTPATIENT
Start: 2020-03-12 | End: 2020-08-03 | Stop reason: SDUPTHER

## 2020-06-08 RX ORDER — FLUTICASONE PROPIONATE 50 MCG
SPRAY, SUSPENSION (ML) NASAL
Qty: 15.8 ML | Refills: 2 | Status: SHIPPED | OUTPATIENT
Start: 2020-06-08 | End: 2020-08-03 | Stop reason: SDUPTHER

## 2020-07-24 RX ORDER — LORATADINE 10 MG/1
TABLET ORAL
Qty: 30 TABLET | Refills: 0 | Status: SHIPPED | OUTPATIENT
Start: 2020-07-24 | End: 2020-11-06

## 2020-08-03 ENCOUNTER — OFFICE VISIT (OUTPATIENT)
Dept: SPORTS MEDICINE | Facility: CLINIC | Age: 55
End: 2020-08-03

## 2020-08-03 ENCOUNTER — TELEPHONE (OUTPATIENT)
Dept: SPORTS MEDICINE | Facility: CLINIC | Age: 55
End: 2020-08-03

## 2020-08-03 DIAGNOSIS — R05.9 COUGH: ICD-10-CM

## 2020-08-03 DIAGNOSIS — I10 BENIGN ESSENTIAL HTN: Primary | ICD-10-CM

## 2020-08-03 DIAGNOSIS — E78.2 MIXED HYPERLIPIDEMIA: ICD-10-CM

## 2020-08-03 PROCEDURE — 99442 PR PHYS/QHP TELEPHONE EVALUATION 11-20 MIN: CPT | Performed by: FAMILY MEDICINE

## 2020-08-03 RX ORDER — LOSARTAN POTASSIUM AND HYDROCHLOROTHIAZIDE 12.5; 5 MG/1; MG/1
1 TABLET ORAL DAILY
Qty: 30 TABLET | Refills: 3 | Status: SHIPPED | OUTPATIENT
Start: 2020-08-03 | End: 2021-03-01 | Stop reason: SDUPTHER

## 2020-08-03 RX ORDER — BENZONATATE 200 MG/1
200 CAPSULE ORAL 3 TIMES DAILY PRN
Qty: 30 CAPSULE | Refills: 0 | Status: SHIPPED | OUTPATIENT
Start: 2020-08-03 | End: 2020-08-04

## 2020-08-03 RX ORDER — LOVASTATIN 40 MG/1
40 TABLET ORAL NIGHTLY
Qty: 30 TABLET | Refills: 3 | Status: SHIPPED | OUTPATIENT
Start: 2020-08-03 | End: 2021-03-01 | Stop reason: SDUPTHER

## 2020-08-03 RX ORDER — FLUTICASONE PROPIONATE 50 MCG
2 SPRAY, SUSPENSION (ML) NASAL DAILY
Qty: 15.8 ML | Refills: 2 | Status: SHIPPED | OUTPATIENT
Start: 2020-08-03 | End: 2020-08-04

## 2020-08-03 RX ORDER — LANSOPRAZOLE 30 MG/1
30 CAPSULE, DELAYED RELEASE ORAL DAILY
Qty: 90 CAPSULE | Refills: 3 | Status: SHIPPED | OUTPATIENT
Start: 2020-08-03 | End: 2021-03-01 | Stop reason: SDUPTHER

## 2020-08-03 NOTE — PROGRESS NOTES
Telephone Visit Note    Chief Complaint   Patient presents with   • poss covid exposure        History of Present Illness  1. Needs med refills n/c missed physical.     2.  has had mild cough and nasal congestion x 8 days, no fever but has had some chills. Over the weekend he felt worse. She's had a mild cough for about 6 days now. No fever but had some chills over the weekend. Mild body aches.       Diagnoses and all orders for this visit:    Benign essential HTN    Mixed hyperlipidemia    Cough  -     QUESTIONNAIRE SERIES    Refill meds    Recommend eval at urgent care and testing      This patient was evaluated by telephone due to current precautions with COVID-19.  Consent to telephone visit for this problem was given by the patient. I spent a total of 15 minutes on the phone with the patient.

## 2020-08-03 NOTE — TELEPHONE ENCOUNTER
Patient and  is having several Covid-19 symptoms.     She would like to talk to you and request medication refills for her diabetes.     Update: patient was scheduled for a telephone visit for today.

## 2020-08-13 ENCOUNTER — OFFICE VISIT (OUTPATIENT)
Dept: SPORTS MEDICINE | Facility: CLINIC | Age: 55
End: 2020-08-13

## 2020-08-13 DIAGNOSIS — J06.9 UPPER RESPIRATORY TRACT INFECTION DUE TO COVID-19 VIRUS: Primary | ICD-10-CM

## 2020-08-13 DIAGNOSIS — U07.1 UPPER RESPIRATORY TRACT INFECTION DUE TO COVID-19 VIRUS: Primary | ICD-10-CM

## 2020-08-13 PROCEDURE — 99443 PR PHYS/QHP TELEPHONE EVALUATION 21-30 MIN: CPT | Performed by: FAMILY MEDICINE

## 2020-08-13 RX ORDER — DEXTROMETHORPHAN HYDROBROMIDE AND PROMETHAZINE HYDROCHLORIDE 15; 6.25 MG/5ML; MG/5ML
SYRUP ORAL
Qty: 180 ML | Refills: 0 | Status: SHIPPED | OUTPATIENT
Start: 2020-08-13 | End: 2021-05-20

## 2020-08-13 RX ORDER — KETOTIFEN FUMARATE 0.35 MG/ML
1 SOLUTION/ DROPS OPHTHALMIC 2 TIMES DAILY
Qty: 10 ML | Refills: 0 | Status: SHIPPED | OUTPATIENT
Start: 2020-08-13 | End: 2021-06-23

## 2020-08-13 NOTE — PROGRESS NOTES
Telephone Visit Note    CC: f/u covid infection     History of Present Illness  Discussed recovery from coronavirus infection with patient.   on the phone as a backup for language barrier.  She is recovering well, has not had a fever and cough is persistent but improving.  She does complain of some eye burning without any purulent drainage.   is also recovering from illness.      Diagnoses and all orders for this visit:    Upper respiratory tract infection due to COVID-19 virus    Other orders  -     promethazine-dextromethorphan (PROMETHAZINE-DM) 6.25-15 MG/5ML syrup; Take 5-10mL po q6 hours prn cough  -     ketotifen (ZADITOR) 0.025 % ophthalmic solution; Apply 1 drop to eye(s) as directed by provider 2 (Two) Times a Day.    Recovering well.  Discussed isolation timeline, quarantine of her son who is living in the house as well and exposed, etc.  Symptomatic care with cough syrup and Zaditor for the secondary conjunctivitis.      This patient was evaluated by telephone due to current precautions with COVID-19.  Consent to telephone visit for this problem was given by the patient. I spent a total of 29 minutes on the phone with the patient.

## 2020-10-06 ENCOUNTER — HOSPITAL ENCOUNTER (OUTPATIENT)
Dept: ULTRASOUND IMAGING | Facility: HOSPITAL | Age: 55
Discharge: HOME | End: 2020-10-06

## 2020-10-06 ENCOUNTER — HOSPITAL ENCOUNTER (OUTPATIENT)
Dept: MAMMOGRAPHY | Facility: HOSPITAL | Age: 55
Discharge: HOME | End: 2020-10-06

## 2020-10-06 DIAGNOSIS — R92.8 ABNORMAL MAMMOGRAM: ICD-10-CM

## 2020-10-06 PROCEDURE — G0279 TOMOSYNTHESIS, MAMMO: HCPCS

## 2020-10-06 PROCEDURE — 77065 DX MAMMO INCL CAD UNI: CPT

## 2020-10-06 PROCEDURE — 76642 ULTRASOUND BREAST LIMITED: CPT

## 2020-10-07 ENCOUNTER — TELEPHONE (OUTPATIENT)
Dept: OBSTETRICS AND GYNECOLOGY | Facility: CLINIC | Age: 55
End: 2020-10-07

## 2020-10-07 DIAGNOSIS — N63.20 LEFT BREAST MASS: Primary | ICD-10-CM

## 2020-10-07 NOTE — TELEPHONE ENCOUNTER
MAMMO CALLED AND PATIENT NEEDS AN ULTRASOUND GUIDED BIOPSY OF THE LEFT BREAST. CAN YOU PUT AN ORDER IN FOR THIS PLEASE.

## 2020-10-19 ENCOUNTER — HOSPITAL ENCOUNTER (OUTPATIENT)
Dept: MAMMOGRAPHY | Facility: HOSPITAL | Age: 55
Discharge: HOME | End: 2020-10-19

## 2020-10-19 ENCOUNTER — HOSPITAL ENCOUNTER (OUTPATIENT)
Dept: ULTRASOUND IMAGING | Facility: HOSPITAL | Age: 55
Discharge: HOME | End: 2020-10-19

## 2020-10-19 VITALS
HEART RATE: 96 BPM | BODY MASS INDEX: 27.4 KG/M2 | WEIGHT: 127 LBS | HEIGHT: 57 IN | DIASTOLIC BLOOD PRESSURE: 80 MMHG | TEMPERATURE: 100.1 F | SYSTOLIC BLOOD PRESSURE: 135 MMHG | OXYGEN SATURATION: 100 % | RESPIRATION RATE: 18 BRPM

## 2020-10-19 DIAGNOSIS — N63.20 LEFT BREAST MASS: ICD-10-CM

## 2020-10-19 PROCEDURE — 88305 TISSUE EXAM BY PATHOLOGIST: CPT | Performed by: OBSTETRICS & GYNECOLOGY

## 2020-10-19 PROCEDURE — A4648 IMPLANTABLE TISSUE MARKER: HCPCS

## 2020-10-19 PROCEDURE — 25010000003 LIDOCAINE 1 % SOLUTION: Performed by: RADIOLOGY

## 2020-10-19 PROCEDURE — 77065 DX MAMMO INCL CAD UNI: CPT

## 2020-10-19 RX ORDER — LIDOCAINE HYDROCHLORIDE 10 MG/ML
10 INJECTION, SOLUTION INFILTRATION; PERINEURAL ONCE
Status: COMPLETED | OUTPATIENT
Start: 2020-10-19 | End: 2020-10-19

## 2020-10-19 RX ORDER — LIDOCAINE HYDROCHLORIDE AND EPINEPHRINE 10; 10 MG/ML; UG/ML
10 INJECTION, SOLUTION INFILTRATION; PERINEURAL ONCE
Status: COMPLETED | OUTPATIENT
Start: 2020-10-19 | End: 2020-10-19

## 2020-10-19 RX ADMIN — LIDOCAINE HYDROCHLORIDE 1 ML: 10 INJECTION, SOLUTION INFILTRATION; PERINEURAL at 15:42

## 2020-10-19 RX ADMIN — LIDOCAINE HYDROCHLORIDE,EPINEPHRINE BITARTRATE 7 ML: 10; .01 INJECTION, SOLUTION INFILTRATION; PERINEURAL at 15:42

## 2020-10-19 NOTE — NURSING NOTE
VSS. Denies pain. Medical/surgical history and medications reviewed. No distress noted. Procedural education completed with patient's questions addressed. Maine Lopez #[phone] on speaker phone during entire assessment and consenting process.

## 2020-10-19 NOTE — NURSING NOTE
Biopsy site to left mid lower breast clear with Skin Affix dry and intact. No firmness or swelling noted at or around biopsy site. Denies pain. Ice pack with protective covering applied to biopsy site. Discharge instructions discussed with understanding voiced by patient. Copies provided to patient. No distress noted. To home via private vehicle accompanied by her . Kali Lopez #[id number] on speaker phone throughout discharge/post care instructions. Patient's  speaks English and was also present.

## 2020-10-19 NOTE — H&P
Name: Savita Lucio ADMIT: 10/19/2020   : 1965  PCP: Diego Crum MD    MRN: [id number] LOS: 0 days   AGE/SEX: 55 y.o. female  ROOM: Room/bed info not found       Chief complaint left breast lesion    Present Illness or Internal History:  Patient is a 55 y.o. female presents with left breast lesion corresponding to AD for US bx.     Past Surgical History:  Past Surgical History:   Procedure Laterality Date   •  SECTION     • HEMORRHOIDECTOMY         Past Medical History:  Past Medical History:   Diagnosis Date   • Abdominal pain    • Acid reflux 7/15/2016   • Anemia    • Breast pain    • Contraception    • Hemorrhoid    • History of Papanicolaou smear of cervix 2015   • HLD (hyperlipidemia) 7/15/2016   • Hypertension        Home Medications:  (Not in a hospital admission)      Allergies:  No known drug allergy    Family History:  Family History   Problem Relation Age of Onset   • Heart attack Mother    • Hyperlipidemia Father    • Hyperlipidemia Sister    • No Known Problems Son    • Cervical cancer Cousin        Social History:  Social History     Tobacco Use   • Smoking status: Never Smoker   • Smokeless tobacco: Never Used   Substance Use Topics   • Alcohol use: No   • Drug use: No        Objective     Physical Exam:    No exam performed today,    Vital Signs  Temp:  [100.1 °F (37.8 °C)] 100.1 °F (37.8 °C)  Heart Rate:  [114] 114  Resp:  [18] 18  BP: (136)/(85) 136/85    Anticipated Surgical Procedure:  Left breast ultrasound guided core needle biopsy    The risks, benefits and alternatives of this procedure have been discussed with the patient or responsible party: Yes (using a )       Hanna Skinner MD  10/19/20  16:21 EDT

## 2020-10-19 NOTE — NURSING NOTE
Dr. Skinner in with patient consented and site marked. All patient questions addressed with Molly Maine #[id number] on speaker phone.

## 2020-10-22 LAB
CYTO UR: NORMAL
LAB AP CASE REPORT: NORMAL
LAB AP CLINICAL INFORMATION: NORMAL
LAB AP DIAGNOSIS COMMENT: NORMAL
PATH REPORT.FINAL DX SPEC: NORMAL
PATH REPORT.GROSS SPEC: NORMAL

## 2020-10-27 ENCOUNTER — TELEPHONE (OUTPATIENT)
Dept: OBSTETRICS AND GYNECOLOGY | Facility: CLINIC | Age: 55
End: 2020-10-27

## 2020-11-06 RX ORDER — LORATADINE 10 MG/1
TABLET ORAL
Qty: 30 TABLET | Refills: 1 | Status: SHIPPED | OUTPATIENT
Start: 2020-11-06 | End: 2021-03-01 | Stop reason: SDUPTHER

## 2020-11-10 ENCOUNTER — TELEPHONE (OUTPATIENT)
Dept: SPORTS MEDICINE | Facility: CLINIC | Age: 55
End: 2020-11-10

## 2020-11-10 ENCOUNTER — RESULTS ENCOUNTER (OUTPATIENT)
Dept: SPORTS MEDICINE | Facility: CLINIC | Age: 55
End: 2020-11-10

## 2020-11-10 DIAGNOSIS — Z00.00 ANNUAL PHYSICAL EXAM: ICD-10-CM

## 2020-11-10 DIAGNOSIS — E78.2 MIXED HYPERLIPIDEMIA: ICD-10-CM

## 2020-11-10 DIAGNOSIS — Z13.29 THYROID DISORDER SCREEN: ICD-10-CM

## 2020-11-10 NOTE — TELEPHONE ENCOUNTER
Patient is in office today and is requesting a lab order to check her hormone level.     Spoke to Dr. Crum and he went ahead and placed the lab orders.

## 2020-11-11 LAB
ALBUMIN SERPL-MCNC: 4.4 G/DL (ref 3.5–5.2)
ALBUMIN/GLOB SERPL: 1.8 G/DL
ALP SERPL-CCNC: 48 U/L (ref 39–117)
ALT SERPL-CCNC: 13 U/L (ref 1–33)
AST SERPL-CCNC: 12 U/L (ref 1–32)
BASOPHILS # BLD AUTO: 0.05 10*3/MM3 (ref 0–0.2)
BASOPHILS NFR BLD AUTO: 0.9 % (ref 0–1.5)
BILIRUB SERPL-MCNC: 0.3 MG/DL (ref 0–1.2)
BUN SERPL-MCNC: 13 MG/DL (ref 6–20)
BUN/CREAT SERPL: 20 (ref 7–25)
CALCIUM SERPL-MCNC: 9 MG/DL (ref 8.6–10.5)
CHLORIDE SERPL-SCNC: 100 MMOL/L (ref 98–107)
CHOLEST SERPL-MCNC: 193 MG/DL (ref 0–200)
CHOLEST/HDLC SERPL: 4.6 {RATIO}
CO2 SERPL-SCNC: 26.6 MMOL/L (ref 22–29)
CREAT SERPL-MCNC: 0.65 MG/DL (ref 0.57–1)
EOSINOPHIL # BLD AUTO: 0.12 10*3/MM3 (ref 0–0.4)
EOSINOPHIL NFR BLD AUTO: 2.1 % (ref 0.3–6.2)
ERYTHROCYTE [DISTWIDTH] IN BLOOD BY AUTOMATED COUNT: 14.2 % (ref 12.3–15.4)
ESTRADIOL SERPL-MCNC: 132 PG/ML
FSH SERPL-ACNC: 14.4 MIU/ML
GLOBULIN SER CALC-MCNC: 2.4 GM/DL
GLUCOSE SERPL-MCNC: 90 MG/DL (ref 65–99)
HBA1C MFR BLD: 5.3 % (ref 4.8–5.6)
HCT VFR BLD AUTO: 42.1 % (ref 34–46.6)
HDLC SERPL-MCNC: 42 MG/DL (ref 40–60)
HGB BLD-MCNC: 12.6 G/DL (ref 12–15.9)
IMM GRANULOCYTES # BLD AUTO: 0.02 10*3/MM3 (ref 0–0.05)
IMM GRANULOCYTES NFR BLD AUTO: 0.3 % (ref 0–0.5)
LDLC SERPL CALC-MCNC: 124 MG/DL (ref 0–100)
LH SERPL-ACNC: 11.8 MIU/ML
LYMPHOCYTES # BLD AUTO: 2.44 10*3/MM3 (ref 0.7–3.1)
LYMPHOCYTES NFR BLD AUTO: 42.4 % (ref 19.6–45.3)
MCH RBC QN AUTO: 23.7 PG (ref 26.6–33)
MCHC RBC AUTO-ENTMCNC: 29.9 G/DL (ref 31.5–35.7)
MCV RBC AUTO: 79.3 FL (ref 79–97)
MONOCYTES # BLD AUTO: 0.69 10*3/MM3 (ref 0.1–0.9)
MONOCYTES NFR BLD AUTO: 12 % (ref 5–12)
NEUTROPHILS # BLD AUTO: 2.43 10*3/MM3 (ref 1.7–7)
NEUTROPHILS NFR BLD AUTO: 42.3 % (ref 42.7–76)
NRBC BLD AUTO-RTO: 0 /100 WBC (ref 0–0.2)
PLATELET # BLD AUTO: 354 10*3/MM3 (ref 140–450)
POTASSIUM SERPL-SCNC: 4.1 MMOL/L (ref 3.5–5.2)
PROT SERPL-MCNC: 6.8 G/DL (ref 6–8.5)
RBC # BLD AUTO: 5.31 10*6/MM3 (ref 3.77–5.28)
SODIUM SERPL-SCNC: 135 MMOL/L (ref 136–145)
TRIGL SERPL-MCNC: 153 MG/DL (ref 0–150)
TSH SERPL DL<=0.005 MIU/L-ACNC: 2.49 UIU/ML (ref 0.45–4.5)
UNABLE TO VOID: NORMAL
VLDLC SERPL CALC-MCNC: 27 MG/DL (ref 5–40)
WBC # BLD AUTO: 5.75 10*3/MM3 (ref 3.4–10.8)

## 2020-11-17 ENCOUNTER — OFFICE VISIT (OUTPATIENT)
Dept: SPORTS MEDICINE | Facility: CLINIC | Age: 55
End: 2020-11-17

## 2020-11-17 VITALS
SYSTOLIC BLOOD PRESSURE: 122 MMHG | WEIGHT: 127.8 LBS | OXYGEN SATURATION: 99 % | BODY MASS INDEX: 27.57 KG/M2 | HEIGHT: 57 IN | TEMPERATURE: 98.4 F | DIASTOLIC BLOOD PRESSURE: 84 MMHG | HEART RATE: 94 BPM

## 2020-11-17 DIAGNOSIS — Z00.00 ANNUAL PHYSICAL EXAM: Primary | ICD-10-CM

## 2020-11-17 PROCEDURE — 93000 ELECTROCARDIOGRAM COMPLETE: CPT | Performed by: FAMILY MEDICINE

## 2020-11-17 PROCEDURE — 99396 PREV VISIT EST AGE 40-64: CPT | Performed by: FAMILY MEDICINE

## 2020-11-17 RX ORDER — MELATONIN
1000 DAILY
COMMUNITY

## 2020-11-17 NOTE — PROGRESS NOTES
"Savita Lucio is here today for an annual physical exam.     Eating a healthy diet. Exercising routinely.   Recently had breast biopsy  Mid-back pain worse getting out of bed in the morning; imrpoves later in the day with exercise.   Left eye twitching  Last week had an episode of vertigo that subsided  Has had lower BP since recovering from COVID, taking just 1/2 tab  Menses becoming very irregular    PHQ-2 Depression Screening  Little interest or pleasure in doing things? 0   Feeling down, depressed, or hopeless? 0   PHQ-2 Total Score 0           Health Maintenance   Topic Date Due   • TDAP/TD VACCINES (1 - Tdap) 06/16/1986   • HEPATITIS C SCREENING  04/07/2017   • DXA SCAN  04/07/2017   • ZOSTER VACCINE (1 of 2) 06/16/2017   • ANNUAL PHYSICAL  03/15/2020   • Annual Gynecologic Pelvic and Breast Exam  07/10/2020   • INFLUENZA VACCINE  08/01/2020   • MAMMOGRAM  10/19/2021   • LIPID PANEL  11/10/2021   • PAP SMEAR  07/09/2022   • COLONOSCOPY  04/30/2028   • Pneumococcal Vaccine 0-64  Aged Out        Review of Systems   Constitutional: Negative.    Respiratory: Negative for cough and shortness of breath.    Cardiovascular: Positive for palpitations.       /84 (BP Location: Left arm, Patient Position: Sitting, Cuff Size: Adult)   Pulse 94   Temp 98.4 °F (36.9 °C) (Oral)   Ht 145 cm (57.09\")   Wt 58 kg (127 lb 12.8 oz)   SpO2 99%   BMI 27.57 kg/m²      Physical Exam    Vital signs reviewed.  General appearance: No acute distress  Eyes: conjunctiva clear without erythema; pupils equally round and reactive  ENT: external ears normal; hearing normal  Neck: supple; no thyromegaly  CV: normal rate and rhythm; no peripheral edema  Respiratory: normal respiratory effort; lungs clear to auscultation bilaterally  MSK: normal gait and station; no focal joint deformity or swelling  Skin: no rash or wounds; normal turgor  Neuro: cranial nerves 2-12 grossly intact; normal sensation to light touch  Psych: mood and affect " normal; recent and remote memory intact    Office Visit on 11/17/2020   Component Date Value Ref Range Status   • Specific Gravity, UA 11/17/2020 1.005  1.005 - 1.030 Final   • pH, UA 11/17/2020 7.0  5.0 - 7.5 Final   • Color, UA 11/17/2020 Yellow  Yellow Final   • Appearance, UA 11/17/2020 Clear  Clear Final   • Leukocytes, UA 11/17/2020 Negative  Negative Final   • Protein 11/17/2020 Negative  Negative/Trace Final   • Glucose, UA 11/17/2020 Negative  Negative Final   • Ketones 11/17/2020 Negative  Negative Final   • Blood, UA 11/17/2020 Negative  Negative Final   • Bilirubin, UA 11/17/2020 Negative  Negative Final   • Urobilinogen, UA 11/17/2020 0.2  0.2 - 1.0 mg/dL Final   • Nitrite, UA 11/17/2020 Negative  Negative Final   • Microscopic Examination 11/17/2020 Comment   Final    Microscopic follows if indicated.   • Microscopic Examination 11/17/2020 See below:   Final    Microscopic was indicated and was performed.   • Urinalysis Reflex 11/17/2020 Comment   Final    This specimen will not reflex to a Urine Culture.   • WBC, UA 11/17/2020 0-5  0 - 5 /hpf Final   • RBC, UA 11/17/2020 None seen  0 - 2 /hpf Final   • Epithelial Cells (non renal) 11/17/2020 None seen  0 - 10 /hpf Final   • Bacteria, UA 11/17/2020 Few  None seen/Few /hpf Final   Results Encounter on 11/10/2020   Component Date Value Ref Range Status   • Glucose 11/10/2020 90  65 - 99 mg/dL Final   • BUN 11/10/2020 13  6 - 20 mg/dL Final   • Creatinine 11/10/2020 0.65  0.57 - 1.00 mg/dL Final   • eGFR Non African Am 11/10/2020 95  >60 mL/min/1.73 Final   • eGFR African Am 11/10/2020 116  >60 mL/min/1.73 Final   • BUN/Creatinine Ratio 11/10/2020 20.0  7.0 - 25.0 Final   • Sodium 11/10/2020 135* 136 - 145 mmol/L Final   • Potassium 11/10/2020 4.1  3.5 - 5.2 mmol/L Final   • Chloride 11/10/2020 100  98 - 107 mmol/L Final   • Total CO2 11/10/2020 26.6  22.0 - 29.0 mmol/L Final   • Calcium 11/10/2020 9.0  8.6 - 10.5 mg/dL Final   • Total Protein 11/10/2020  6.8  6.0 - 8.5 g/dL Final   • Albumin 11/10/2020 4.40  3.50 - 5.20 g/dL Final   • Globulin 11/10/2020 2.4  gm/dL Final   • A/G Ratio 11/10/2020 1.8  g/dL Final   • Total Bilirubin 11/10/2020 0.3  0.0 - 1.2 mg/dL Final   • Alkaline Phosphatase 11/10/2020 48  39 - 117 U/L Final   • AST (SGOT) 11/10/2020 12  1 - 32 U/L Final   • ALT (SGPT) 11/10/2020 13  1 - 33 U/L Final   • Total Cholesterol 11/10/2020 193  0 - 200 mg/dL Final   • Triglycerides 11/10/2020 153* 0 - 150 mg/dL Final   • HDL Cholesterol 11/10/2020 42  40 - 60 mg/dL Final   • VLDL Cholesterol [place] 11/10/2020 27  5 - 40 mg/dL Final   • LDL Chol Calc (NIH) 11/10/2020 124* 0 - 100 mg/dL Final   • Chol/HDL Ratio 11/10/2020 4.60   Final   • WBC 11/10/2020 5.75  3.40 - 10.80 10*3/mm3 Final   • RBC 11/10/2020 5.31* 3.77 - 5.28 10*6/mm3 Final   • Hemoglobin 11/10/2020 12.6  12.0 - 15.9 g/dL Final   • Hematocrit 11/10/2020 42.1  34.0 - 46.6 % Final   • MCV 11/10/2020 79.3  79.0 - 97.0 fL Final   • MCH 11/10/2020 23.7* 26.6 - 33.0 pg Final   • MCHC 11/10/2020 29.9* 31.5 - 35.7 g/dL Final   • RDW 11/10/2020 14.2  12.3 - 15.4 % Final   • Platelets 11/10/2020 354  140 - 450 10*3/mm3 Final   • Neutrophil Rel % 11/10/2020 42.3* 42.7 - 76.0 % Final   • Lymphocyte Rel % 11/10/2020 42.4  19.6 - 45.3 % Final   • Monocyte Rel % 11/10/2020 12.0  5.0 - 12.0 % Final   • Eosinophil Rel % 11/10/2020 2.1  0.3 - 6.2 % Final   • Basophil Rel % 11/10/2020 0.9  0.0 - 1.5 % Final   • Neutrophils Absolute 11/10/2020 2.43  1.70 - 7.00 10*3/mm3 Final   • Lymphocytes Absolute 11/10/2020 2.44  0.70 - 3.10 10*3/mm3 Final   • Monocytes Absolute 11/10/2020 0.69  0.10 - 0.90 10*3/mm3 Final   • Eosinophils Absolute 11/10/2020 0.12  0.00 - 0.40 10*3/mm3 Final   • Basophils Absolute 11/10/2020 0.05  0.00 - 0.20 10*3/mm3 Final   • Immature Granulocyte Rel % 11/10/2020 0.3  0.0 - 0.5 % Final   • Immature Grans Absolute 11/10/2020 0.02  0.00 - 0.05 10*3/mm3 Final   • nRBC 11/10/2020 0.0  0.0 - 0.2 /100  WBC Final   • TSH 11/10/2020 2.490  0.450 - 4.500 uIU/mL Final    Comment: No apparent thyroid disorder. Additional testing not indicated. In  rare instances, Secondary Hypothyroidism as well as Subclinical  Hypothyroidism have been reported in some patients with normal TSH  values.     • Hemoglobin A1C 11/10/2020 5.30  4.80 - 5.60 % Final    Comment: Hemoglobin A1C Ranges:  Increased Risk for Diabetes  5.7% to 6.4%  Diabetes                     >= 6.5%  Diabetic Goal                < 7.0%     • LH 11/10/2020 11.8  mIU/mL Final    Comment:                     Adult Female:                        Follicular phase      2.4 -  12.6                        Ovulation phase      14.0 -  95.6                        Luteal phase          1.0 -  11.4                        Postmenopausal        7.7 -  58.5     • FSH 11/10/2020 14.4  mIU/mL Final    Comment:                     Adult Female:                        Follicular phase      3.5 -  12.5                        Ovulation phase       4.7 -  21.5                        Luteal phase          1.7 -   7.7                        Postmenopausal       25.8 - 134.8     • Estradiol 11/10/2020 132.0  pg/mL Final    Comment:                     Adult Female:                        Follicular phase   12.5 -   166.0                        Ovulation phase    85.8 -   498.0                        Luteal phase       43.8 -   211.0                        Postmenopausal     <6.0 -    54.7                      Pregnancy                        1st trimester     215.0 - >4300.0  Roche ECLIA methodology     • Unable to Void 11/10/2020 Comment   Final    Comment: The patient was not able to render a urine sample and has been  instructed to return for a urine collection at their earliest  convenience.  The urine testing that you have requested has  been deleted from this report.  When the patient returns and  provides a urine specimen, the urine testing will be performed  and separately  reported.           Current Outpatient Medications:   •  cholecalciferol (VITAMIN D3) 25 MCG (1000 UT) tablet, Take 1,000 Units by mouth Daily., Disp: , Rfl:   •  ketotifen (ZADITOR) 0.025 % ophthalmic solution, Apply 1 drop to eye(s) as directed by provider 2 (Two) Times a Day., Disp: 10 mL, Rfl: 0  •  lansoprazole (PREVACID) 30 MG capsule, Take 1 capsule by mouth Daily., Disp: 90 capsule, Rfl: 3  •  loratadine (CLARITIN) 10 MG tablet, TAKE ONE TABLET BY MOUTH DAILY, Disp: 30 tablet, Rfl: 1  •  losartan-hydrochlorothiazide (HYZAAR) 50-12.5 MG per tablet, Take 1 tablet by mouth Daily., Disp: 30 tablet, Rfl: 3  •  lovastatin (MEVACOR) 40 MG tablet, Take 1 tablet by mouth Every Night., Disp: 30 tablet, Rfl: 3  •  promethazine-dextromethorphan (PROMETHAZINE-DM) 6.25-15 MG/5ML syrup, Take 5-10mL po q6 hours prn cough, Disp: 180 mL, Rfl: 0  •  vitamin E 100 UNIT capsule, Take 100 Units by mouth Daily., Disp: , Rfl:   •  Zinc 22.5 MG tablet, Take  by mouth., Disp: , Rfl:       ECG 12 Lead    Date/Time: 11/17/2020 1:40 PM  Performed by: Diego Crum MD  Authorized by: Diego Crum MD   Previous ECG: no previous ECG available  Rhythm: sinus rhythm  Rate: normal  Conduction: conduction normal  QRS axis: normal    Clinical impression: normal ECG            Diagnoses and all orders for this visit:    1. Annual physical exam (Primary)  -     ECG 12 Lead  -     Urinalysis With Culture If Indicated -    Other orders  -     Microscopic Examination -        Encourage healthy diet and exercise.  Encourage patient to stay up to date on screening examinations as indicated based on age and risk factors.  EKG is normal.  Discussed continued recovery from Covid infection, currently appears uncomplicated.  Continue management of hypertension, hyperlipidemia, etc.  Continue 1/2 tablet of antihypertensives at this time.      EMR Dragon/Transcription disclaimer:    Much of this encounter note is an electronic  transcription/translation of spoken language to printed text.  The electronic translation of spoken language may permit erroneous, or at times, nonsensical words or phrases to be inadvertently transcribed.  Although I have reviewed the note for such errors some may still exist.

## 2020-11-18 LAB
APPEARANCE UR: CLEAR
BACTERIA #/AREA URNS HPF: NORMAL /HPF
BILIRUB UR QL STRIP: NEGATIVE
COLOR UR: YELLOW
EPI CELLS #/AREA URNS HPF: NORMAL /HPF (ref 0–10)
GLUCOSE UR QL: NEGATIVE
HGB UR QL STRIP: NEGATIVE
KETONES UR QL STRIP: NEGATIVE
LEUKOCYTE ESTERASE UR QL STRIP: NEGATIVE
MICRO URNS: NORMAL
MICRO URNS: NORMAL
NITRITE UR QL STRIP: NEGATIVE
PH UR STRIP: 7 [PH] (ref 5–7.5)
PROT UR QL STRIP: NEGATIVE
RBC #/AREA URNS HPF: NORMAL /HPF (ref 0–2)
SP GR UR: 1 (ref 1–1.03)
URINALYSIS REFLEX: NORMAL
UROBILINOGEN UR STRIP-MCNC: 0.2 MG/DL (ref 0.2–1)
WBC #/AREA URNS HPF: NORMAL /HPF (ref 0–5)

## 2020-11-24 ENCOUNTER — OFFICE VISIT (OUTPATIENT)
Dept: OBSTETRICS AND GYNECOLOGY | Facility: CLINIC | Age: 55
End: 2020-11-24

## 2020-11-24 ENCOUNTER — TELEPHONE (OUTPATIENT)
Dept: OBSTETRICS AND GYNECOLOGY | Facility: CLINIC | Age: 55
End: 2020-11-24

## 2020-11-24 VITALS
DIASTOLIC BLOOD PRESSURE: 80 MMHG | BODY MASS INDEX: 27.61 KG/M2 | SYSTOLIC BLOOD PRESSURE: 132 MMHG | WEIGHT: 128 LBS | HEIGHT: 57 IN

## 2020-11-24 DIAGNOSIS — Z01.419 PAP SMEAR, LOW-RISK: ICD-10-CM

## 2020-11-24 DIAGNOSIS — Z11.51 SPECIAL SCREENING EXAMINATION FOR HUMAN PAPILLOMAVIRUS (HPV): ICD-10-CM

## 2020-11-24 DIAGNOSIS — Z13.9 SCREENING FOR CONDITION: ICD-10-CM

## 2020-11-24 DIAGNOSIS — Z01.419 ENCOUNTER FOR GYNECOLOGICAL EXAMINATION: Primary | ICD-10-CM

## 2020-11-24 DIAGNOSIS — N63.0 BREAST MASS IN FEMALE: ICD-10-CM

## 2020-11-24 LAB
BILIRUB BLD-MCNC: NEGATIVE MG/DL
CLARITY, POC: CLEAR
COLOR UR: YELLOW
GLUCOSE UR STRIP-MCNC: NEGATIVE MG/DL
KETONES UR QL: NEGATIVE
LEUKOCYTE EST, POC: NEGATIVE
NITRITE UR-MCNC: NEGATIVE MG/ML
PH UR: 5 [PH] (ref 5–8)
PROT UR STRIP-MCNC: NEGATIVE MG/DL
RBC # UR STRIP: NEGATIVE /UL
SP GR UR: 1.03 (ref 1–1.03)
UROBILINOGEN UR QL: NORMAL

## 2020-11-24 PROCEDURE — 81002 URINALYSIS NONAUTO W/O SCOPE: CPT | Performed by: OBSTETRICS & GYNECOLOGY

## 2020-11-24 PROCEDURE — 99396 PREV VISIT EST AGE 40-64: CPT | Performed by: OBSTETRICS & GYNECOLOGY

## 2020-11-24 NOTE — PROGRESS NOTES
GYN Annual Exam     CC- Here for annual exam.     Savita Lucio is a 55 y.o. female who presents for annual well woman exam. Periods are irregular, lasting 4-5 days and very light. She went 6 months with no cycle and recently had a few days of bleeding.  Dysmenorrhea:none. Vasomotor symptoms: intermittently at night. Pt has no hx of HRT use. Pt saw Dr Mesa for bilateral breast pain and her last visit was 3/2019. Pt denies any further breast pain. Pt recently had an abnormal MMG and is s/p left breast biopsy. Last colonoscopy 2018.      OB History        1    Para   1    Term   1       0    AB   0    Living   1       SAB   0    TAB   0    Ectopic   0    Molar        Multiple   0    Live Births   1                Current contraception: none  History of abnormal Pap smear: no  History of abnormal mammogram: 10/2020. Had a biopsy- benign.  Family history of uterine, colon or ovarian cancer: no  Family history of breast cancer: no    Health Maintenance   Topic Date Due   • TDAP/TD VACCINES (1 - Tdap) 1986   • HEPATITIS C SCREENING  2017   • DXA SCAN  2017   • ZOSTER VACCINE (1 of 2) 2017   • Annual Gynecologic Pelvic and Breast Exam  07/10/2020   • INFLUENZA VACCINE  2020   • MAMMOGRAM  10/19/2021   • LIPID PANEL  11/10/2021   • ANNUAL PHYSICAL  2021   • PAP SMEAR  2023   • COLONOSCOPY  2028   • Pneumococcal Vaccine 0-64  Aged Out       Past Medical History:   Diagnosis Date   • Abdominal pain    • Acid reflux 7/15/2016   • Anemia    • Breast pain    • Contraception    • Hemorrhoid    • History of Papanicolaou smear of cervix 2015   • HLD (hyperlipidemia) 7/15/2016   • Hypertension        Past Surgical History:   Procedure Laterality Date   •  SECTION     • HEMORRHOIDECTOMY           Current Outpatient Medications:   •  cholecalciferol (VITAMIN D3) 25 MCG (1000 UT) tablet, Take 1,000 Units by mouth Daily., Disp: , Rfl:   •  ketotifen  "(ZADITOR) 0.025 % ophthalmic solution, Apply 1 drop to eye(s) as directed by provider 2 (Two) Times a Day., Disp: 10 mL, Rfl: 0  •  lansoprazole (PREVACID) 30 MG capsule, Take 1 capsule by mouth Daily., Disp: 90 capsule, Rfl: 3  •  loratadine (CLARITIN) 10 MG tablet, TAKE ONE TABLET BY MOUTH DAILY, Disp: 30 tablet, Rfl: 1  •  losartan-hydrochlorothiazide (HYZAAR) 50-12.5 MG per tablet, Take 1 tablet by mouth Daily., Disp: 30 tablet, Rfl: 3  •  lovastatin (MEVACOR) 40 MG tablet, Take 1 tablet by mouth Every Night., Disp: 30 tablet, Rfl: 3  •  promethazine-dextromethorphan (PROMETHAZINE-DM) 6.25-15 MG/5ML syrup, Take 5-10mL po q6 hours prn cough, Disp: 180 mL, Rfl: 0  •  vitamin E 100 UNIT capsule, Take 100 Units by mouth Daily., Disp: , Rfl:   •  Zinc 22.5 MG tablet, Take  by mouth., Disp: , Rfl:     Allergies   Allergen Reactions   • No Known Drug Allergy        Social History     Tobacco Use   • Smoking status: Never Smoker   • Smokeless tobacco: Never Used   Substance Use Topics   • Alcohol use: No   • Drug use: No       Family History   Problem Relation Age of Onset   • Heart attack Mother    • Hyperlipidemia Father    • Hyperlipidemia Sister    • No Known Problems Son    • Cervical cancer Cousin        Review of Systems   Constitutional: Negative for appetite change, chills, fatigue, fever and unexpected weight change.   Gastrointestinal: Negative for abdominal distention, abdominal pain, anal bleeding, blood in stool, constipation, diarrhea, nausea and vomiting.   Genitourinary: Negative for dyspareunia, dysuria, menstrual problem, pelvic pain, vaginal bleeding, vaginal discharge and vaginal pain.       /80   Ht 145 cm (57.09\")   Wt 58.1 kg (128 lb)   BMI 27.61 kg/m²     Physical Exam  Vitals signs reviewed.   Constitutional:       Appearance: She is well-developed.   HENT:      Mouth/Throat:      Dentition: Normal dentition. No dental caries.   Cardiovascular:      Rate and Rhythm: Normal rate and " regular rhythm.      Heart sounds: Normal heart sounds.   Pulmonary:      Effort: Pulmonary effort is normal. No respiratory distress.      Breath sounds: Normal breath sounds. No stridor. No wheezing.   Chest:      Breasts:         Right: No inverted nipple, mass, nipple discharge, skin change or tenderness.         Left: No inverted nipple, mass, nipple discharge, skin change or tenderness.          Comments: S/p left breast biopsy. Well healing area.  Abdominal:      General: There is no distension.      Palpations: Abdomen is soft. There is no mass.      Tenderness: There is no abdominal tenderness.   Genitourinary:     Labia:         Right: No rash, tenderness or lesion.         Left: No rash, tenderness or lesion.       Vagina: No vaginal discharge, tenderness or bleeding.      Cervix: No cervical motion tenderness, discharge or friability.      Uterus: Not deviated, not enlarged, not fixed and not tender.       Adnexa:         Right: No mass, tenderness or fullness.          Left: No mass, tenderness or fullness.     Musculoskeletal: Normal range of motion.         General: No tenderness.   Skin:     General: Skin is warm.      Findings: No erythema or rash.   Neurological:      Mental Status: She is alert and oriented to person, place, and time.      Cranial Nerves: No cranial nerve deficit.      Coordination: Coordination normal.   Psychiatric:         Behavior: Behavior normal.         Thought Content: Thought content normal.         Judgment: Judgment normal.            Assessment/Plan    1) GYN HM: Check pap smear. MMG 10/2020. SBE demonstrated and encouraged. Colonoscopy UTD.  2) Hx Bilateral mastodynia and abnormal MMG: pt has had negative imaging. She has been seeing Dr Mesa for pt's sx. She was last seen 3/2019. Dr Mesa thought her breast pain was related to her hormonal fluctuations. Pt has resolved breast pain.  Recommended that pt take Vitamin E bid and to wear a suppportive bra with no  tim. No fu recommended unless symptoms worsen. Pt had abnormal MMG 10/2020 and had biopsy benign but will refer back to Dr Mesa for possible excisional biopsy.  3) Bone health - Weight bearing exercise, dietary calcium recommendations and vitamin D reviewed.   4) Diet and Exercise discussed  5) Smoking Status: nonsmoker  6) Perimenopause: pt is having irregular menstrual cycles.  She is also having intermittent vasomotor symptoms.  No HRT. Recent FSH and estradiol normal and do not reveal menopause.  7) Follow up prn and 1 year       Diagnoses and all orders for this visit:    Encounter for gynecological examination    Screening for condition  -     POC Urinalysis Dipstick    Special screening examination for human papillomavirus (HPV)  -     Pap IG, HPV-hr    Pap smear, low-risk  -     Pap IG, HPV-hr    Breast mass in female  -     Ambulatory Referral to Breast Surgery        Alina Piper DO  11/28/2020  20:21 EST

## 2020-11-24 NOTE — TELEPHONE ENCOUNTER
Franchesca, this patient had a biopsy that was benign 10/22/20. Radiology is requesting excisional biopsy be considered. This pt has seen Dr Mesa in the past. I would like her to see her again to see next step in management. Thank you.

## 2020-11-25 ENCOUNTER — TELEPHONE (OUTPATIENT)
Dept: SURGERY | Facility: CLINIC | Age: 55
End: 2020-11-25

## 2020-12-18 RX ORDER — TRIAMCINOLONE ACETONIDE 55 UG/1
SPRAY, METERED NASAL
Qty: 16.9 EACH | Refills: 10 | Status: SHIPPED | OUTPATIENT
Start: 2020-12-18 | End: 2021-02-05

## 2021-01-22 ENCOUNTER — TELEPHONE (OUTPATIENT)
Dept: OBSTETRICS AND GYNECOLOGY | Facility: CLINIC | Age: 56
End: 2021-01-22

## 2021-01-22 ENCOUNTER — TELEPHONE (OUTPATIENT)
Dept: SURGERY | Facility: CLINIC | Age: 56
End: 2021-01-22

## 2021-01-22 NOTE — TELEPHONE ENCOUNTER
Called referring physician office-  Spoke to Cheryl, she said she will let Dr Piper know patient has no showed x2.    Sobeida

## 2021-01-22 NOTE — TELEPHONE ENCOUNTER
Sobeida from Dr. Conroy office called and stated patient hs no showed twice. They are happy to see if her if she will go.

## 2021-01-22 NOTE — TELEPHONE ENCOUNTER
Spoke to  last night, he said his wife would be here for her appt with  . 1-21-21      Pt no showed for her appt with   Called- lm on her v.m to call us.      Called , left message on his v.m to call.     was here for us.    Sobeida

## 2021-01-23 NOTE — TELEPHONE ENCOUNTER
This pt has no showed 2x to Dr Mesa;s office. She had a biopsy in 10/2020 of a breast mass and although benign it is being recommended she have the entire thing removed because it is distorted and imaging doesn't match biopsy results. Do we call her and encourage her to show up? Send a letter? She only speaks Malaysian....

## 2021-02-04 DIAGNOSIS — J30.1 SEASONAL ALLERGIC RHINITIS DUE TO POLLEN: Primary | ICD-10-CM

## 2021-02-05 RX ORDER — FLUTICASONE PROPIONATE 50 MCG
SPRAY, SUSPENSION (ML) NASAL
Qty: 16 G | Refills: 11 | Status: SHIPPED | OUTPATIENT
Start: 2021-02-05 | End: 2022-01-05

## 2021-03-01 DIAGNOSIS — I10 BENIGN ESSENTIAL HTN: ICD-10-CM

## 2021-03-01 DIAGNOSIS — J30.1 SEASONAL ALLERGIC RHINITIS DUE TO POLLEN: ICD-10-CM

## 2021-03-01 DIAGNOSIS — K21.9 GASTROESOPHAGEAL REFLUX DISEASE, UNSPECIFIED WHETHER ESOPHAGITIS PRESENT: Primary | ICD-10-CM

## 2021-03-01 DIAGNOSIS — E78.2 MIXED HYPERLIPIDEMIA: ICD-10-CM

## 2021-03-01 RX ORDER — LORATADINE 10 MG/1
10 TABLET ORAL DAILY
Qty: 90 TABLET | Refills: 3 | Status: SHIPPED | OUTPATIENT
Start: 2021-03-01 | End: 2023-03-22 | Stop reason: SDUPTHER

## 2021-03-01 RX ORDER — LOVASTATIN 40 MG/1
40 TABLET ORAL NIGHTLY
Qty: 30 TABLET | Refills: 3 | Status: SHIPPED | OUTPATIENT
Start: 2021-03-01 | End: 2021-08-03

## 2021-03-01 RX ORDER — LOSARTAN POTASSIUM AND HYDROCHLOROTHIAZIDE 12.5; 5 MG/1; MG/1
1 TABLET ORAL DAILY
Qty: 90 TABLET | Refills: 3 | Status: SHIPPED | OUTPATIENT
Start: 2021-03-01 | End: 2021-04-27

## 2021-03-01 RX ORDER — LANSOPRAZOLE 30 MG/1
30 CAPSULE, DELAYED RELEASE ORAL DAILY
Qty: 90 CAPSULE | Refills: 3 | Status: SHIPPED | OUTPATIENT
Start: 2021-03-01 | End: 2022-06-08

## 2021-03-01 NOTE — TELEPHONE ENCOUNTER
Patient called and wanted to know if you can refill her medications.     I have placed the orders for you to co-sign, thank you!

## 2021-03-30 ENCOUNTER — BULK ORDERING (OUTPATIENT)
Dept: CASE MANAGEMENT | Facility: OTHER | Age: 56
End: 2021-03-30

## 2021-03-30 DIAGNOSIS — Z23 IMMUNIZATION DUE: ICD-10-CM

## 2021-04-27 ENCOUNTER — OFFICE VISIT (OUTPATIENT)
Dept: SPORTS MEDICINE | Facility: CLINIC | Age: 56
End: 2021-04-27

## 2021-04-27 VITALS
WEIGHT: 128 LBS | TEMPERATURE: 98.2 F | DIASTOLIC BLOOD PRESSURE: 78 MMHG | SYSTOLIC BLOOD PRESSURE: 126 MMHG | HEIGHT: 57 IN | BODY MASS INDEX: 27.61 KG/M2 | RESPIRATION RATE: 17 BRPM | HEART RATE: 119 BPM | OXYGEN SATURATION: 97 %

## 2021-04-27 DIAGNOSIS — I10 BENIGN ESSENTIAL HTN: ICD-10-CM

## 2021-04-27 DIAGNOSIS — R09.89 LABILE BLOOD PRESSURE: Primary | ICD-10-CM

## 2021-04-27 PROCEDURE — 99214 OFFICE O/P EST MOD 30 MIN: CPT | Performed by: FAMILY MEDICINE

## 2021-04-27 RX ORDER — AMLODIPINE BESYLATE 5 MG/1
5 TABLET ORAL DAILY
Qty: 30 TABLET | Refills: 1 | Status: SHIPPED | OUTPATIENT
Start: 2021-04-27 | End: 2021-05-20

## 2021-04-27 NOTE — PROGRESS NOTES
"Savita is a 55 y.o. year old female    Chief Complaint   Patient presents with   • Hypertension     follow up        History of Present Illness   HPI   Recent UTI; BP elevated at   BP variable at home - up to 151/93 but as low as 104/79. Describes some headaches with high BP; chills with low BP.   Has been taking only 1/2 tab of her losartan/hctz.     R neck side headache    Review of Systems    /78   Pulse 119   Temp 98.2 °F (36.8 °C) (Temporal)   Resp 17   Ht 144.8 cm (57\")   Wt 58.1 kg (128 lb)   SpO2 97%   Breastfeeding No   BMI 27.70 kg/m²          Physical Exam  Vitals reviewed.   Constitutional:       Appearance: She is well-developed.   Neck:      Comments: Tenderness palpation right cervical occipital junction  Cardiovascular:      Rate and Rhythm: Normal rate and regular rhythm.      Heart sounds: Normal heart sounds.   Pulmonary:      Effort: Pulmonary effort is normal.      Breath sounds: Normal breath sounds.   Musculoskeletal:      Cervical back: Normal range of motion.   Skin:     General: Skin is warm and dry.   Neurological:      Mental Status: She is alert and oriented to person, place, and time.   Psychiatric:         Mood and Affect: Mood normal.           Current Outpatient Medications:   •  cholecalciferol (VITAMIN D3) 25 MCG (1000 UT) tablet, Take 1,000 Units by mouth Daily., Disp: , Rfl:   •  fluticasone (FLONASE) 50 MCG/ACT nasal spray, SPRAY TWO SPRAYS IN EACH NOSTRIL ONCE DAILY, Disp: 16 g, Rfl: 11  •  ketotifen (ZADITOR) 0.025 % ophthalmic solution, Apply 1 drop to eye(s) as directed by provider 2 (Two) Times a Day., Disp: 10 mL, Rfl: 0  •  lansoprazole (PREVACID) 30 MG capsule, Take 1 capsule by mouth Daily., Disp: 90 capsule, Rfl: 3  •  loratadine (CLARITIN) 10 MG tablet, Take 1 tablet by mouth Daily. As needed for allergies, Disp: 90 tablet, Rfl: 3  •  lovastatin (MEVACOR) 40 MG tablet, Take 1 tablet by mouth Every Night., Disp: 30 tablet, Rfl: 3  •  nitrofurantoin, " macrocrystal-monohydrate, (MACROBID) 100 MG capsule, Take 1 capsule by mouth 2 (Two) Times a Day., Disp: 10 capsule, Rfl: 0  •  promethazine-dextromethorphan (PROMETHAZINE-DM) 6.25-15 MG/5ML syrup, Take 5-10mL po q6 hours prn cough, Disp: 180 mL, Rfl: 0  •  vitamin E 100 UNIT capsule, Take 100 Units by mouth Daily., Disp: , Rfl:   •  Zinc 22.5 MG tablet, Take  by mouth., Disp: , Rfl:   •  amLODIPine (NORVASC) 5 MG tablet, Take 1 tablet by mouth Daily., Disp: 30 tablet, Rfl: 1     Diagnoses and all orders for this visit:    Labile blood pressure  -     Ambulatory Referral to Cardiology    Benign essential HTN  -     Ambulatory Referral to Cardiology    Other orders  -     amLODIPine (NORVASC) 5 MG tablet; Take 1 tablet by mouth Daily.       Try changing to amlodipine; set up cardio eval if that is not adequate for lability for second look.   Discussed NSAIDs, heat, massage, stretching for cervicogenic headache.

## 2021-05-20 ENCOUNTER — OFFICE VISIT (OUTPATIENT)
Dept: CARDIOLOGY | Facility: CLINIC | Age: 56
End: 2021-05-20

## 2021-05-20 VITALS
HEIGHT: 57 IN | DIASTOLIC BLOOD PRESSURE: 100 MMHG | SYSTOLIC BLOOD PRESSURE: 160 MMHG | WEIGHT: 123 LBS | HEART RATE: 106 BPM | BODY MASS INDEX: 26.54 KG/M2

## 2021-05-20 DIAGNOSIS — E78.2 MIXED HYPERLIPIDEMIA: ICD-10-CM

## 2021-05-20 DIAGNOSIS — R00.2 PALPITATIONS: Primary | ICD-10-CM

## 2021-05-20 DIAGNOSIS — I10 BENIGN ESSENTIAL HTN: ICD-10-CM

## 2021-05-20 PROCEDURE — 99204 OFFICE O/P NEW MOD 45 MIN: CPT | Performed by: INTERNAL MEDICINE

## 2021-05-20 PROCEDURE — 93000 ELECTROCARDIOGRAM COMPLETE: CPT | Performed by: INTERNAL MEDICINE

## 2021-05-20 RX ORDER — CARVEDILOL 3.12 MG/1
3.12 TABLET ORAL 2 TIMES DAILY WITH MEALS
Qty: 90 TABLET | Refills: 3 | Status: SHIPPED | OUTPATIENT
Start: 2021-05-20 | End: 2021-06-10 | Stop reason: SDUPTHER

## 2021-05-20 RX ORDER — CHLORTHALIDONE 25 MG/1
25 TABLET ORAL DAILY
Qty: 90 TABLET | Refills: 3 | Status: SHIPPED | OUTPATIENT
Start: 2021-05-20 | End: 2021-06-10 | Stop reason: SDUPTHER

## 2021-05-20 NOTE — PROGRESS NOTES
[facility] New Patient Office Note     Encounter Date:21  Patient:Savita Lucio  :1965  MRN:[id number]    Referring Provider: Diego Crum MD    Consulted for: Hypertension    Chief Complaint:   Chief Complaint   Patient presents with   • Hypertension     History of Presenting Illness:      Ms. Lucio is a 55 y.o. woman with past medical history notable for hypertension and mixed hyperlipidemia who presents her office for initial office visit with myself regarding management of her hypertension.  Patient has been on a stable regimen with losartan/hydrochlorothiazide for a number of years but recently after having COVID-19 back in August there has been some issues with labile blood pressures.  She had been asked to decrease this to a half a tablet a day which did help some but was still having issues with blood pressures ranging from 100 systolic all the way up to 160 systolic at home.  She was also having fairly elevated diastolic pressures in the 90s to 100s consistently.  Along with this she is maintained episodes of tachycardia in the low 100s.  Her primary care physician recently stopped her hydrochlorothiazide/ARB and started her on 5 mg of amlodipine but really this has not helped her blood pressure or heart rate.  Given the challenges with her blood pressure management she was referred to us for further evaluation especially in light of her recent COVID-19 infection.      Review of Systems:  Review of Systems   Constitutional: Negative.   HENT: Negative.    Eyes: Negative.    Cardiovascular: Positive for palpitations.   Respiratory: Negative.    Endocrine: Negative.    Hematologic/Lymphatic: Negative.    Skin: Negative.    Musculoskeletal: Negative.    Gastrointestinal: Negative.    Genitourinary: Negative.    Neurological: Negative.    Psychiatric/Behavioral: The patient is nervous/anxious.    Allergic/Immunologic: Negative.        Current Outpatient Medications on File Prior to  Visit   Medication Sig Dispense Refill   • cholecalciferol (VITAMIN D3) 25 MCG (1000 UT) tablet Take 1,000 Units by mouth Daily.     • fluticasone (FLONASE) 50 MCG/ACT nasal spray SPRAY TWO SPRAYS IN EACH NOSTRIL ONCE DAILY 16 g 11   • ketotifen (ZADITOR) 0.025 % ophthalmic solution Apply 1 drop to eye(s) as directed by provider 2 (Two) Times a Day. 10 mL 0   • lansoprazole (PREVACID) 30 MG capsule Take 1 capsule by mouth Daily. 90 capsule 3   • loratadine (CLARITIN) 10 MG tablet Take 1 tablet by mouth Daily. As needed for allergies 90 tablet 3   • lovastatin (MEVACOR) 40 MG tablet Take 1 tablet by mouth Every Night. 30 tablet 3   • vitamin E 100 UNIT capsule Take 100 Units by mouth Daily.     • Zinc 22.5 MG tablet Take  by mouth.     • [DISCONTINUED] amLODIPine (NORVASC) 5 MG tablet Take 1 tablet by mouth Daily. 30 tablet 1   • [DISCONTINUED] nitrofurantoin, macrocrystal-monohydrate, (MACROBID) 100 MG capsule Take 1 capsule by mouth 2 (Two) Times a Day. 10 capsule 0   • [DISCONTINUED] promethazine-dextromethorphan (PROMETHAZINE-DM) 6.25-15 MG/5ML syrup Take 5-10mL po q6 hours prn cough 180 mL 0     No current facility-administered medications on file prior to visit.       No Known Allergies    Past Medical History:   Diagnosis Date   • Abdominal pain    • Acid reflux 7/15/2016   • Anemia    • Breast pain    • Contraception    • Hemorrhoid    • History of Papanicolaou smear of cervix 2015   • HLD (hyperlipidemia) 7/15/2016   • Hypertension        Past Surgical History:   Procedure Laterality Date   •  SECTION     • HEMORRHOIDECTOMY         Social History     Socioeconomic History   • Marital status:      Spouse name: Not on file   • Number of children: Not on file   • Years of education: Not on file   • Highest education level: Not on file   Tobacco Use   • Smoking status: Never Smoker   • Smokeless tobacco: Never Used   Substance and Sexual Activity   • Alcohol use: No     Comment: denies  "caffeine use    • Drug use: No   • Sexual activity: Yes       Family History   Problem Relation Age of Onset   • Heart attack Mother    • Hyperlipidemia Father    • Hyperlipidemia Sister    • No Known Problems Son    • Cervical cancer Cousin        The following portions of the patient's history were reviewed and updated as appropriate: allergies, current medications, past family history, past medical history, past social history, past surgical history and problem list.       Objective:       Vitals:    05/20/21 1321   BP: 160/100   BP Location: Left arm   Patient Position: Sitting   Pulse: 106   Weight: 55.8 kg (123 lb)   Height: 144.8 cm (57\")     Body mass index is 26.62 kg/m².     Physical Exam:  Constitutional: Well appearing, well developed, no acute distress   HENT: Oropharynx clear and membrane moist  Eyes: Normal conjunctiva, no sclera icterus.  Neck: Supple, no carotid bruit bilaterally.  Cardiovascular: Regular and Tachycardia rate and rhythm, No Murmur, No bilateral lower extremity edema.  Pulmonary: Normal respiratory effort, normal lung sounds, no wheezing.  Abdominal: Soft, nontender, no hepatosplenomegaly, liver is non-pulsatile.  Neurological: Alert and orient x 3.   Skin: Warm, dry, no ecchymosis, no rash.  Psych: Appropriate mood and affect. Normal judgment and insight.      Lab Results   Component Value Date     (L) 11/10/2020     03/14/2019    K 4.1 11/10/2020    K 3.7 03/14/2019     11/10/2020     03/14/2019    CO2 26.6 11/10/2020    CO2 25.9 03/14/2019    BUN 13 11/10/2020    BUN 11 03/14/2019    CREATININE 0.65 11/10/2020    CREATININE 0.66 03/14/2019    EGFRIFNONA 95 11/10/2020    EGFRIFNONA 94 03/14/2019    EGFRIFAFRI 116 11/10/2020    EGFRIFAFRI 114 03/14/2019    GLUCOSE 98 07/22/2016    CALCIUM 9.0 11/10/2020    CALCIUM 9.2 03/14/2019    PROTENTOTREF 6.8 11/10/2020    PROTENTOTREF 6.9 03/14/2019    ALBUMIN 4.40 11/10/2020    ALBUMIN 4.40 03/14/2019    BILITOT 0.3 " 11/10/2020    BILITOT 0.3 03/14/2019    AST 12 11/10/2020    AST 13 03/14/2019    ALT 13 11/10/2020    ALT 13 03/14/2019     Lab Results   Component Value Date    WBC 5.75 11/10/2020    WBC 7.48 03/14/2019    HGB 12.6 11/10/2020    HGB 12.8 03/14/2019    HCT 42.1 11/10/2020    HCT 41.1 03/14/2019    MCV 79.3 11/10/2020    MCV 78.3 (L) 03/14/2019     11/10/2020     03/14/2019     Lab Results   Component Value Date    TRIG 153 (H) 11/10/2020    TRIG 115 03/14/2019    HDL 42 11/10/2020    HDL 42 03/14/2019     (H) 11/10/2020    LDL 80 03/14/2019     No results found for: PROBNP, BNP  No results found for: CKTOTAL, CKMB, CKMBINDEX, TROPONINI, TROPONINT  Lab Results   Component Value Date    TSH 2.490 11/10/2020    TSH 1.830 03/14/2019         ECG 12 Lead    Date/Time: 5/20/2021 2:12 PM  Performed by: Fabio Caruso MD  Authorized by: Fabio Caruso MD   Comparison: compared with previous ECG from 11/1/2020  Similar to previous ECG  Rhythm: sinus tachycardia  Other findings: non-specific ST-T wave changes    Clinical impression: non-specific ECG              Assessment:          Diagnosis Plan   1. Palpitations  Adult Transthoracic Echo Complete W/ Cont if Necessary Per Protocol    ECG 12 Lead   2. Benign essential HTN  Basic Metabolic Panel   3. Mixed hyperlipidemia            Plan:       Ms. Lucio is a 55 y.o. woman with past medical history notable for hypertension and mixed hyperlipidemia who presents her office for initial office visit with myself regarding management of her hypertension.  In general her blood pressure management does seem to be somewhat challenging.  Unclear if COVID-19 played a role or not.  She does have fairly labile pressures along with what appears to be fairly consistent tachycardia whether blood pressure is high or low.  I would like to start a low-dose carvedilol to see if we can help out with her blood pressure and heart rate.  I would also like to start  chlorthalidone as I think that this long-acting version of a thiazide diuretic does very well for patients with labile pressures.  Additionally it will help with her diastolic pressures.  In the meantime of asked her to stop her amlodipine.  I went to bring her back for repeat blood work and blood pressure check in a week and potentially continue to titrate her carvedilol as needed.  She will bring her blood pressure cuff with her so that we can make sure that it is well calibrated.  Finally given her recent COVID-19 infection as well as her tachycardia would not make sure were not dealing with any significant cardiomyopathy with an echocardiogram.      Palpitations:  · Follow-up echocardiogram  · We will monitor response to starting beta-blocker therapy and decide if any monitoring is needed  · Normal CMP and TSH 11/2020    Hypertension:  · Will stop amlodipine  · We will start carvedilol 3.125 mg twice daily  · We will start chlorthalidone 25 mg daily  · CMP 11/2020 within normal limits with normal sodium and creatinine while on hydrochlorothiazide  · Repeat BMP in 1 week    Hyperlipidemia:  · Continue statin therapy  · Lipid panel 11/2020 with mildly elevated LDL  · CMP 11/2020 with normal ALT and AST      Follow-up:  4 weeks      Thank you for allowing me to participate in the care of Savita Lucio. Feel free to contact me directly with any further questions or concerns.    Fabio Caruso MD  [facility]  05/20/21  14:14 EDT

## 2021-05-20 NOTE — PATIENT INSTRUCTIONS
1. Stop amlodipine  2. Start Chlorthalidone 25 mg daily  3. Start Carvedilol 3.125 mg twice a day  4. Will get echocardiogram   5. Will bring back for BP check in a week

## 2021-05-21 ENCOUNTER — TELEPHONE (OUTPATIENT)
Dept: CARDIOLOGY | Facility: CLINIC | Age: 56
End: 2021-05-21

## 2021-05-21 NOTE — TELEPHONE ENCOUNTER
Tried calling patient to let her know that  would like labs drawn on her when she comes in for her echo. Patient does not speak english, and nobody else was available for me to speak to.

## 2021-05-28 ENCOUNTER — HOSPITAL ENCOUNTER (OUTPATIENT)
Dept: CARDIOLOGY | Facility: HOSPITAL | Age: 56
Discharge: HOME | End: 2021-05-28
Admitting: INTERNAL MEDICINE

## 2021-05-28 ENCOUNTER — CLINICAL SUPPORT (OUTPATIENT)
Dept: CARDIOLOGY | Facility: CLINIC | Age: 56
End: 2021-05-28

## 2021-05-28 VITALS
WEIGHT: 123 LBS | HEIGHT: 57 IN | BODY MASS INDEX: 26.54 KG/M2 | SYSTOLIC BLOOD PRESSURE: 144 MMHG | HEART RATE: 78 BPM | DIASTOLIC BLOOD PRESSURE: 80 MMHG

## 2021-05-28 VITALS — SYSTOLIC BLOOD PRESSURE: 148 MMHG | DIASTOLIC BLOOD PRESSURE: 97 MMHG

## 2021-05-28 DIAGNOSIS — R00.2 PALPITATIONS: ICD-10-CM

## 2021-05-28 PROCEDURE — 93306 TTE W/DOPPLER COMPLETE: CPT | Performed by: INTERNAL MEDICINE

## 2021-05-28 PROCEDURE — 93306 TTE W/DOPPLER COMPLETE: CPT

## 2021-05-28 NOTE — PROGRESS NOTES
Patient here for b/p check after some medication changes.  Amlodipine was d/c and carvedilol and chlorthalidone were added.    She did bring a log of readings at home, which were all good readings.    Her b/p here today with our cuff was 144/88.  With her cuff it was 148/97, so it does appear her cuff is comparable.    Discussed with Navya, no med changes to be made, stay on current regimen and continue to monitor b/p at home.  Instructed her to call if she is consistently getting higher readings, in the 140s or above.    Patient voiced understanding via .

## 2021-06-01 LAB
AORTIC ARCH: 1.8 CM
ASCENDING AORTA: 3.1 CM
BH CV ECHO MEAS - ACS: 1.7 CM
BH CV ECHO MEAS - AO MAX PG (FULL): 1.9 MMHG
BH CV ECHO MEAS - AO MAX PG: 4.9 MMHG
BH CV ECHO MEAS - AO MEAN PG (FULL): 0.98 MMHG
BH CV ECHO MEAS - AO MEAN PG: 2.4 MMHG
BH CV ECHO MEAS - AO ROOT AREA (BSA CORRECTED): 2
BH CV ECHO MEAS - AO ROOT AREA: 6.4 CM^2
BH CV ECHO MEAS - AO ROOT DIAM: 2.9 CM
BH CV ECHO MEAS - AO V2 MAX: 110.3 CM/SEC
BH CV ECHO MEAS - AO V2 MEAN: 69.9 CM/SEC
BH CV ECHO MEAS - AO V2 VTI: 21.2 CM
BH CV ECHO MEAS - ASC AORTA: 3.1 CM
BH CV ECHO MEAS - AVA(I,A): 2.6 CM^2
BH CV ECHO MEAS - AVA(I,D): 2.6 CM^2
BH CV ECHO MEAS - AVA(V,A): 2.4 CM^2
BH CV ECHO MEAS - AVA(V,D): 2.4 CM^2
BH CV ECHO MEAS - BSA(HAYCOCK): 1.5 M^2
BH CV ECHO MEAS - BSA: 1.5 M^2
BH CV ECHO MEAS - BZI_BMI: 26.6 KILOGRAMS/M^2
BH CV ECHO MEAS - BZI_METRIC_HEIGHT: 144.8 CM
BH CV ECHO MEAS - BZI_METRIC_WEIGHT: 55.8 KG
BH CV ECHO MEAS - EDV(MOD-SP2): 48 ML
BH CV ECHO MEAS - EDV(MOD-SP4): 43 ML
BH CV ECHO MEAS - EDV(TEICH): 95 ML
BH CV ECHO MEAS - EF(CUBED): 84.8 %
BH CV ECHO MEAS - EF(MOD-BP): 59 %
BH CV ECHO MEAS - EF(MOD-SP2): 58.3 %
BH CV ECHO MEAS - EF(MOD-SP4): 58.1 %
BH CV ECHO MEAS - EF(TEICH): 78.1 %
BH CV ECHO MEAS - ESV(MOD-SP2): 20 ML
BH CV ECHO MEAS - ESV(MOD-SP4): 18 ML
BH CV ECHO MEAS - ESV(TEICH): 20.8 ML
BH CV ECHO MEAS - FS: 46.7 %
BH CV ECHO MEAS - IVS/LVPW: 0.78
BH CV ECHO MEAS - IVSD: 0.8 CM
BH CV ECHO MEAS - LAT PEAK E' VEL: 11.3 CM/SEC
BH CV ECHO MEAS - LV DIASTOLIC VOL/BSA (35-75): 29.4 ML/M^2
BH CV ECHO MEAS - LV MASS(C)D: 139 GRAMS
BH CV ECHO MEAS - LV MASS(C)DI: 95 GRAMS/M^2
BH CV ECHO MEAS - LV MAX PG: 3 MMHG
BH CV ECHO MEAS - LV MEAN PG: 1.4 MMHG
BH CV ECHO MEAS - LV SYSTOLIC VOL/BSA (12-30): 12.3 ML/M^2
BH CV ECHO MEAS - LV V1 MAX: 86.5 CM/SEC
BH CV ECHO MEAS - LV V1 MEAN: 52.3 CM/SEC
BH CV ECHO MEAS - LV V1 VTI: 18 CM
BH CV ECHO MEAS - LVIDD: 4.6 CM
BH CV ECHO MEAS - LVIDS: 2.4 CM
BH CV ECHO MEAS - LVLD AP2: 6.7 CM
BH CV ECHO MEAS - LVLD AP4: 6.3 CM
BH CV ECHO MEAS - LVLS AP2: 5.1 CM
BH CV ECHO MEAS - LVLS AP4: 5.2 CM
BH CV ECHO MEAS - LVOT AREA (M): 3.1 CM^2
BH CV ECHO MEAS - LVOT AREA: 3.1 CM^2
BH CV ECHO MEAS - LVOT DIAM: 2 CM
BH CV ECHO MEAS - LVPWD: 1 CM
BH CV ECHO MEAS - MED PEAK E' VEL: 9.5 CM/SEC
BH CV ECHO MEAS - MV A DUR: 0.12 SEC
BH CV ECHO MEAS - MV A MAX VEL: 76.7 CM/SEC
BH CV ECHO MEAS - MV DEC SLOPE: 413.3 CM/SEC^2
BH CV ECHO MEAS - MV DEC TIME: 0.19 SEC
BH CV ECHO MEAS - MV E MAX VEL: 89.5 CM/SEC
BH CV ECHO MEAS - MV E/A: 1.2
BH CV ECHO MEAS - MV MAX PG: 3.5 MMHG
BH CV ECHO MEAS - MV MEAN PG: 2.1 MMHG
BH CV ECHO MEAS - MV P1/2T MAX VEL: 82.3 CM/SEC
BH CV ECHO MEAS - MV P1/2T: 58.3 MSEC
BH CV ECHO MEAS - MV V2 MAX: 93.1 CM/SEC
BH CV ECHO MEAS - MV V2 MEAN: 71.2 CM/SEC
BH CV ECHO MEAS - MV V2 VTI: 21.3 CM
BH CV ECHO MEAS - MVA P1/2T LCG: 2.7 CM^2
BH CV ECHO MEAS - MVA(P1/2T): 3.8 CM^2
BH CV ECHO MEAS - MVA(VTI): 2.6 CM^2
BH CV ECHO MEAS - PA MAX PG (FULL): 1.2 MMHG
BH CV ECHO MEAS - PA MAX PG: 1.9 MMHG
BH CV ECHO MEAS - PA V2 MAX: 69.8 CM/SEC
BH CV ECHO MEAS - PULM A REVS DUR: 0.11 SEC
BH CV ECHO MEAS - PULM A REVS VEL: 36.4 CM/SEC
BH CV ECHO MEAS - PULM DIAS VEL: 29.1 CM/SEC
BH CV ECHO MEAS - PULM S/D: 1.8
BH CV ECHO MEAS - PULM SYS VEL: 51.8 CM/SEC
BH CV ECHO MEAS - PVA(V,A): 1.7 CM^2
BH CV ECHO MEAS - PVA(V,D): 1.7 CM^2
BH CV ECHO MEAS - QP/QS: 0.4
BH CV ECHO MEAS - RAP SYSTOLE: 3 MMHG
BH CV ECHO MEAS - RV MAX PG: 0.75 MMHG
BH CV ECHO MEAS - RV MEAN PG: 0.35 MMHG
BH CV ECHO MEAS - RV V1 MAX: 43.3 CM/SEC
BH CV ECHO MEAS - RV V1 MEAN: 25.4 CM/SEC
BH CV ECHO MEAS - RV V1 VTI: 8 CM
BH CV ECHO MEAS - RVOT AREA: 2.8 CM^2
BH CV ECHO MEAS - RVOT DIAM: 1.9 CM
BH CV ECHO MEAS - RVSP: 16 MMHG
BH CV ECHO MEAS - SI(AO): 93.1 ML/M^2
BH CV ECHO MEAS - SI(CUBED): 54.7 ML/M^2
BH CV ECHO MEAS - SI(LVOT): 37.8 ML/M^2
BH CV ECHO MEAS - SI(MOD-SP2): 19.1 ML/M^2
BH CV ECHO MEAS - SI(MOD-SP4): 17.1 ML/M^2
BH CV ECHO MEAS - SI(TEICH): 50.7 ML/M^2
BH CV ECHO MEAS - SV(AO): 136.2 ML
BH CV ECHO MEAS - SV(CUBED): 80 ML
BH CV ECHO MEAS - SV(LVOT): 55.2 ML
BH CV ECHO MEAS - SV(MOD-SP2): 28 ML
BH CV ECHO MEAS - SV(MOD-SP4): 25 ML
BH CV ECHO MEAS - SV(RVOT): 22.3 ML
BH CV ECHO MEAS - SV(TEICH): 74.2 ML
BH CV ECHO MEAS - TAPSE (>1.6): 2.4 CM
BH CV ECHO MEAS - TR MAX VEL: 180.3 CM/SEC
BH CV ECHO MEASUREMENTS AVERAGE E/E' RATIO: 8.61
BH CV XLRA - RV BASE: 2.9 CM
BH CV XLRA - RV LENGTH: 6.5 CM
BH CV XLRA - RV MID: 2.1 CM
BH CV XLRA - TDI S': 10.1 CM/SEC
LEFT ATRIUM VOLUME INDEX: 12 ML/M2
MAXIMAL PREDICTED HEART RATE: 167 BPM
SINUS: 2.9 CM
STJ: 2.7 CM
STRESS TARGET HR: 142 BPM

## 2021-06-10 RX ORDER — CHLORTHALIDONE 25 MG/1
25 TABLET ORAL DAILY
Qty: 90 TABLET | Refills: 3 | Status: SHIPPED | OUTPATIENT
Start: 2021-06-10 | End: 2022-06-09 | Stop reason: SDUPTHER

## 2021-06-10 RX ORDER — CARVEDILOL 3.12 MG/1
3.12 TABLET ORAL 2 TIMES DAILY WITH MEALS
Qty: 90 TABLET | Refills: 3 | Status: SHIPPED | OUTPATIENT
Start: 2021-06-10 | End: 2021-06-23 | Stop reason: SDUPTHER

## 2021-06-10 NOTE — TELEPHONE ENCOUNTER
Patient called but was only speaking Tu[place].  Contacted an  to assist in translation.  Patient needs refills for her Carvedilol 3.125mg QD and her Chlorthalidone 25mg QD ASAP as she is leaving [facility].  Verified that she uses the [facility] pharmacy on [address] as listed in the chart.  Her pharmacy only refills 30 days at a time.  Spoke with Dr Caruso's MA and she sent the refills to [facility], since Dr Caruso is out so that these could be filled today.

## 2021-06-23 ENCOUNTER — OFFICE VISIT (OUTPATIENT)
Dept: CARDIOLOGY | Facility: CLINIC | Age: 56
End: 2021-06-23

## 2021-06-23 VITALS
HEART RATE: 96 BPM | SYSTOLIC BLOOD PRESSURE: 156 MMHG | BODY MASS INDEX: 26.1 KG/M2 | DIASTOLIC BLOOD PRESSURE: 90 MMHG | HEIGHT: 57 IN | WEIGHT: 121 LBS

## 2021-06-23 DIAGNOSIS — E78.2 MIXED HYPERLIPIDEMIA: ICD-10-CM

## 2021-06-23 DIAGNOSIS — R00.2 PALPITATIONS: ICD-10-CM

## 2021-06-23 DIAGNOSIS — I10 BENIGN ESSENTIAL HTN: Primary | ICD-10-CM

## 2021-06-23 PROCEDURE — 99214 OFFICE O/P EST MOD 30 MIN: CPT | Performed by: INTERNAL MEDICINE

## 2021-06-23 RX ORDER — CARVEDILOL 6.25 MG/1
6.25 TABLET ORAL 2 TIMES DAILY WITH MEALS
Qty: 30 TABLET | Refills: 3 | Status: SHIPPED | OUTPATIENT
Start: 2021-06-23 | End: 2021-07-12 | Stop reason: SDUPTHER

## 2021-06-23 NOTE — PATIENT INSTRUCTIONS
1. Increase Carvedilol from 3.125 mg to 6.25 mg  2. Will get heart monitor (Holter) for 2 days  3. Blood pressure check in office in 2 weeks

## 2021-06-23 NOTE — PROGRESS NOTES
[facility] Follow Up Patient Office Note     Encounter Date:21  Patient:Savita Lucio  :1965  MRN:[id number]      Chief Complaint: Follow up HTN  Chief Complaint   Patient presents with   • Palpitations     4 week f/u   • Hypertension   • Hyperlipidemia     History of Presenting Illness:      Ms. Lucio is a 56 y.o. woman with past medical history notable for hypertension and mixed hyperlipidemia who presents her office for initial office visit with myself regarding management of her hypertension.  At her last appointment we did make some adjustments with her blood pressure medication to try and even out her blood pressure control.  Fortunately this has helped.  Her blood pressure ranges improved but she is still having some palpitations and racing heart rates.    Review of Systems:  Review of Systems   Constitutional: Negative.   HENT: Negative.    Eyes: Negative.    Cardiovascular: Positive for palpitations.   Respiratory: Negative.    Endocrine: Negative.    Hematologic/Lymphatic: Negative.    Skin: Negative.    Musculoskeletal: Negative.    Gastrointestinal: Negative.    Genitourinary: Negative.    Neurological: Negative.    Psychiatric/Behavioral: The patient is nervous/anxious.    Allergic/Immunologic: Negative.        Current Outpatient Medications on File Prior to Visit   Medication Sig Dispense Refill   • chlorthalidone (HYGROTON) 25 MG tablet Take 1 tablet by mouth Daily. 90 tablet 3   • cholecalciferol (VITAMIN D3) 25 MCG (1000 UT) tablet Take 1,000 Units by mouth Daily.     • fluticasone (FLONASE) 50 MCG/ACT nasal spray SPRAY TWO SPRAYS IN EACH NOSTRIL ONCE DAILY 16 g 11   • lansoprazole (PREVACID) 30 MG capsule Take 1 capsule by mouth Daily. 90 capsule 3   • loratadine (CLARITIN) 10 MG tablet Take 1 tablet by mouth Daily. As needed for allergies 90 tablet 3   • lovastatin (MEVACOR) 40 MG tablet Take 1 tablet by mouth Every Night. 30 tablet 3   • vitamin E 100 UNIT capsule Take  "100 Units by mouth Daily.     • Zinc 22.5 MG tablet Take  by mouth.       No current facility-administered medications on file prior to visit.       No Known Allergies    Past Medical History:   Diagnosis Date   • Abdominal pain    • Acid reflux 7/15/2016   • Anemia    • Breast pain    • Contraception    • Hemorrhoid    • History of Papanicolaou smear of cervix 2015   • HLD (hyperlipidemia) 7/15/2016   • Hypertension        Past Surgical History:   Procedure Laterality Date   •  SECTION     • HEMORRHOIDECTOMY         Social History     Socioeconomic History   • Marital status:      Spouse name: Not on file   • Number of children: Not on file   • Years of education: Not on file   • Highest education level: Not on file   Tobacco Use   • Smoking status: Never Smoker   • Smokeless tobacco: Never Used   Vaping Use   • Vaping Use: Never used   Substance and Sexual Activity   • Alcohol use: No     Comment: denies caffeine use    • Drug use: No   • Sexual activity: Yes       Family History   Problem Relation Age of Onset   • Heart attack Mother    • Hyperlipidemia Father    • Hyperlipidemia Sister    • No Known Problems Son    • Cervical cancer Cousin        The following portions of the patient's history were reviewed and updated as appropriate: allergies, current medications, past family history, past medical history, past social history, past surgical history and problem list.       Objective:       Vitals:    21 1532   BP: 156/90   BP Location: Left arm   Patient Position: Sitting   Pulse: 96   Weight: 54.9 kg (121 lb)   Height: 144.8 cm (57\")     Body mass index is 26.18 kg/m².     Physical Exam:  Constitutional: Well appearing, well developed, no acute distress   HENT: Oropharynx clear and membrane moist  Eyes: Normal conjunctiva, no sclera icterus.  Neck: Supple, no carotid bruit bilaterally.  Cardiovascular: Regular and Tachycardia rate and rhythm, No Murmur, No bilateral lower extremity " edema.  Pulmonary: Normal respiratory effort, normal lung sounds, no wheezing.  Abdominal: Soft, nontender, no hepatosplenomegaly, liver is non-pulsatile.  Neurological: Alert and orient x 3.   Skin: Warm, dry, no ecchymosis, no rash.  Psych: Appropriate mood and affect. Normal judgment and insight.      Lab Results   Component Value Date     (L) 11/10/2020     03/14/2019    K 4.1 11/10/2020    K 3.7 03/14/2019     11/10/2020     03/14/2019    CO2 26.6 11/10/2020    CO2 25.9 03/14/2019    BUN 13 11/10/2020    BUN 11 03/14/2019    CREATININE 0.65 11/10/2020    CREATININE 0.66 03/14/2019    EGFRIFNONA 95 11/10/2020    EGFRIFNONA 94 03/14/2019    EGFRIFAFRI 116 11/10/2020    EGFRIFAFRI 114 03/14/2019    GLUCOSE 98 07/22/2016    CALCIUM 9.0 11/10/2020    CALCIUM 9.2 03/14/2019    PROTENTOTREF 6.8 11/10/2020    PROTENTOTREF 6.9 03/14/2019    ALBUMIN 4.40 11/10/2020    ALBUMIN 4.40 03/14/2019    BILITOT 0.3 11/10/2020    BILITOT 0.3 03/14/2019    AST 12 11/10/2020    AST 13 03/14/2019    ALT 13 11/10/2020    ALT 13 03/14/2019     Lab Results   Component Value Date    WBC 5.75 11/10/2020    WBC 7.48 03/14/2019    HGB 12.6 11/10/2020    HGB 12.8 03/14/2019    HCT 42.1 11/10/2020    HCT 41.1 03/14/2019    MCV 79.3 11/10/2020    MCV 78.3 (L) 03/14/2019     11/10/2020     03/14/2019     Lab Results   Component Value Date    TRIG 153 (H) 11/10/2020    TRIG 115 03/14/2019    HDL 42 11/10/2020    HDL 42 03/14/2019     (H) 11/10/2020    LDL 80 03/14/2019     No results found for: PROBNP, BNP  No results found for: CKTOTAL, CKMB, CKMBINDEX, TROPONINI, TROPONINT  Lab Results   Component Value Date    TSH 2.490 11/10/2020    TSH 1.830 03/14/2019         Echocardiogram 5/28/2021 with images reviewed by myself:  · Left ventricular ejection fraction appears to be 61 - 65%.  · Left ventricular diastolic function was normal.  · Normal right ventricular cavity size and systolic function  noted.  · Calculated right ventricular systolic pressure from tricuspid regurgitation is 16.0 mmHg.  · There is no evidence of pericardial effusion        Assessment:          Diagnosis Plan   1. Benign essential HTN     2. Mixed hyperlipidemia     3. Palpitations  Holter Monitor - 48 Hour          Plan:       Ms. Lucio is a 56 y.o. woman with past medical history notable for hypertension and mixed hyperlipidemia who presents her office for initial office visit with myself regarding management of her hypertension.  Overall doing better but would like to get better control and I have uptitrated her carvedilol.  Hopefully this will improve her heart rate and palpitations.  Since she is continuing to have palpitations I have ordered a Holter monitor to make sure not missing any significant arrhythmias.      Palpitations:  · Echocardiogram with structurally normal heart  · Patient still symptomatic despite starting beta-blocker therapy and will follow up on Holter monitor  · Normal CMP and TSH 11/2020    Hypertension:  · Will increase carvedilol from 3.125 mg to 6.25 mg twice daily  · Continue chlorthalidone 25 mg daily  · CMP 11/2020 within normal limits with normal sodium and creatinine while on hydrochlorothiazide    Hyperlipidemia:  · Continue statin therapy  · Lipid panel 11/2020 with mildly elevated LDL  · CMP 11/2020 with normal ALT and AST      Follow-up:  4 weeks      Thank you for allowing me to participate in the care of Savita Lucio. Feel free to contact me directly with any further questions or concerns.    Fabio Caruso MD  [facility]  06/24/21  10:31 EDT

## 2021-07-12 ENCOUNTER — CLINICAL SUPPORT (OUTPATIENT)
Dept: CARDIOLOGY | Facility: CLINIC | Age: 56
End: 2021-07-12

## 2021-07-12 RX ORDER — CARVEDILOL 12.5 MG/1
12.5 TABLET ORAL 2 TIMES DAILY WITH MEALS
Qty: 60 TABLET | Refills: 3 | Status: SHIPPED | OUTPATIENT
Start: 2021-07-12 | End: 2021-11-10

## 2021-07-12 NOTE — PROGRESS NOTES
Pt came in today for a BP check.  Left arm 140/90, right arm 140/90.  Per Dr. Caruso, pt should increase the Coreg to 12.5mg BID.  Pt understands verbally and will continue to check her BP at home.  Pt also had a 48 hour monitor placed today.  Thanks/andrea

## 2021-08-03 DIAGNOSIS — E78.2 MIXED HYPERLIPIDEMIA: ICD-10-CM

## 2021-08-03 RX ORDER — LOVASTATIN 40 MG/1
TABLET ORAL
Qty: 30 TABLET | Refills: 3 | Status: SHIPPED | OUTPATIENT
Start: 2021-08-03 | End: 2021-11-22 | Stop reason: SDUPTHER

## 2021-08-17 ENCOUNTER — OFFICE VISIT (OUTPATIENT)
Dept: CARDIOLOGY | Facility: CLINIC | Age: 56
End: 2021-08-17

## 2021-08-17 VITALS
WEIGHT: 120 LBS | HEIGHT: 57 IN | DIASTOLIC BLOOD PRESSURE: 100 MMHG | BODY MASS INDEX: 25.89 KG/M2 | HEART RATE: 87 BPM | SYSTOLIC BLOOD PRESSURE: 158 MMHG

## 2021-08-17 DIAGNOSIS — I10 BENIGN ESSENTIAL HTN: Primary | ICD-10-CM

## 2021-08-17 DIAGNOSIS — R07.89 OTHER CHEST PAIN: ICD-10-CM

## 2021-08-17 PROCEDURE — 99214 OFFICE O/P EST MOD 30 MIN: CPT | Performed by: NURSE PRACTITIONER

## 2021-08-17 PROCEDURE — 93000 ELECTROCARDIOGRAM COMPLETE: CPT | Performed by: NURSE PRACTITIONER

## 2021-08-17 RX ORDER — FERROUS SULFATE 325(65) MG
325 TABLET ORAL
COMMUNITY

## 2021-08-17 NOTE — PROGRESS NOTES
"  Date of Office Visit: 2021  Encounter Provider: FAYE Doyle  Place of Service: [facility] CARDIOLOGY  Patient Name: Savita Lucio  :1965    Chief Complaint   Patient presents with   • Hypertension     6 wk f/u   • Hyperlipidemia   • Palpitations   :     HPI: Savita Lucio is a 56 y.o. female who presents today for follow-up.  Old records have been obtained and reviewed by me.  She is a patient of Dr. Caruso's with a past medical history significant for hypertension and hyperlipidemia.  Echocardiogram from May of this year revealed normal LVSF and no significant valvular abnormalities.  She was last seen in the office by Dr. Caruso on 2021 at which time she was doing well following adjustments to her blood pressure medication regimen.  She was also reporting palpitations.  Dr. Caruso increased her carvedilol in order to achieve better blood pressure control and hopefully help with her palpitations.  He also ordered a Holter monitor which revealed normal sinus rhythm with an average heart rate of 80 and very rare extra beats.  She was advised to follow-up in 4 weeks.   The patient is Surinamese-speaking, and the entirety of the visit was conducted via the help of a .  Her blood pressure log from home notes a blood pressure ranging from the low 100s to the 120s and a diastolic in the 70s and 80s.  Evidently she always has high blood pressure at the doctor.  Since her medication was increased a couple of weeks ago, she has been noticing some chest discomfort.  She has a difficult time describing the nature of her chest pain.  She thinks it is exertional.  She also reports a lot of anxiety.  Her mother  in her sleep so she is always scared to go to sleep because she is afraid she will not wake up.  She tells me that her mother  at age 47 \"from high blood pressure and a heart attack\".  She denies any shortness of breath, palpitations, edema, " dizziness, or syncope.    Past Medical History:   Diagnosis Date   • Abdominal pain    • Acid reflux 7/15/2016   • Anemia    • Breast pain    • Contraception    • Hemorrhoid    • History of Papanicolaou smear of cervix 2015   • HLD (hyperlipidemia) 7/15/2016   • Hypertension        Past Surgical History:   Procedure Laterality Date   •  SECTION     • HEMORRHOIDECTOMY         Social History     Socioeconomic History   • Marital status:      Spouse name: Not on file   • Number of children: Not on file   • Years of education: Not on file   • Highest education level: Not on file   Tobacco Use   • Smoking status: Never Smoker   • Smokeless tobacco: Never Used   Vaping Use   • Vaping Use: Never used   Substance and Sexual Activity   • Alcohol use: No     Comment: denies caffeine use    • Drug use: No   • Sexual activity: Yes       Family History   Problem Relation Age of Onset   • Heart attack Mother    • Hyperlipidemia Father    • Hyperlipidemia Sister    • No Known Problems Son    • Cervical cancer Cousin        Review of Systems   Constitutional: Negative.   Cardiovascular: Positive for chest pain. Negative for dyspnea on exertion, leg swelling, orthopnea, paroxysmal nocturnal dyspnea and syncope.   Respiratory: Negative.    Hematologic/Lymphatic: Negative for bleeding problem.   Musculoskeletal: Negative for falls.   Gastrointestinal: Negative for melena.   Neurological: Negative for dizziness and light-headedness.   Psychiatric/Behavioral: The patient is nervous/anxious.        No Known Allergies      Current Outpatient Medications:   •  carvedilol (COREG) 12.5 MG tablet, Take 1 tablet by mouth 2 (Two) Times a Day With Meals., Disp: 60 tablet, Rfl: 3  •  chlorthalidone (HYGROTON) 25 MG tablet, Take 1 tablet by mouth Daily., Disp: 90 tablet, Rfl: 3  •  cholecalciferol (VITAMIN D3) 25 MCG (1000 UT) tablet, Take 1,000 Units by mouth Daily., Disp: , Rfl:   •  Cyanocobalamin (VITAMIN B-12 CR PO), Take  " by mouth., Disp: , Rfl:   •  ferrous sulfate 325 (65 FE) MG tablet, Take 325 mg by mouth Daily With Breakfast., Disp: , Rfl:   •  fluticasone (FLONASE) 50 MCG/ACT nasal spray, SPRAY TWO SPRAYS IN EACH NOSTRIL ONCE DAILY, Disp: 16 g, Rfl: 11  •  lansoprazole (PREVACID) 30 MG capsule, Take 1 capsule by mouth Daily., Disp: 90 capsule, Rfl: 3  •  loratadine (CLARITIN) 10 MG tablet, Take 1 tablet by mouth Daily. As needed for allergies, Disp: 90 tablet, Rfl: 3  •  lovastatin (MEVACOR) 40 MG tablet, TAKE ONE TABLET BY MOUTH ONCE NIGHTLY, Disp: 30 tablet, Rfl: 3  •  vitamin E 100 UNIT capsule, Take 100 Units by mouth Daily., Disp: , Rfl:   •  Zinc 22.5 MG tablet, Take  by mouth., Disp: , Rfl:       Objective:     Vitals:    08/17/21 1344   BP: 158/100   Pulse: 87   Weight: 54.4 kg (120 lb)   Height: 144.8 cm (57\")     Body mass index is 25.97 kg/m².    PHYSICAL EXAM:    Neck:      Vascular: No JVD.   Pulmonary:      Effort: Pulmonary effort is normal.      Breath sounds: Normal breath sounds.   Cardiovascular:      Normal rate. Regular rhythm.      Murmurs: There is no murmur.      No gallop. No click. No rub.   Pulses:     Intact distal pulses.           ECG 12 Lead    Date/Time: 8/17/2021 1:58 PM  Performed by: Charisse Polanco APRN  Authorized by: Charisse Polanco APRN   Comparison: compared with previous ECG from 5/20/2021  Similar to previous ECG  Rhythm: sinus rhythm  Rate: normal  BPM: 87  T inversion: aVL, V2 and V1  T flattening: V3  Other findings: non-specific ST-T wave changes    Clinical impression: abnormal EKG  Comments: Indication: Hypertension              Assessment:       Diagnosis Plan   1. Benign essential HTN  ECG 12 Lead   2. Other chest pain       Orders Placed This Encounter   Procedures   • ECG 12 Lead     This order was created via procedure documentation     Order Specific Question:   Release to patient     Answer:   Immediate          Plan:       1.  Hypertension.  Her blood pressure " log looked really great.  I think this is simply whitecoat hypertension.  I would continue the current regimen including carvedilol and chlorthalidone.      2.  Chest pain.  I think the language barrier is making it difficult for me to completely grasp of the nature of her pain.  It is very likely related to anxiety.  However, given that her mother  young from what sounds like early coronary disease, I do not think a stress test would be unreasonable.  She is hesitant to proceed.  I also offered a coronary calcium score.  Ultimately, she would like to discuss this with her .  She will let me know.        I told her to let me know if she decides to move forward with either a stress test or coronary calcium score.  If she declines, I think her PCP is perfectly capable of managing her blood pressure.        As always, it has been a pleasure to participate in your patient's care.      Sincerely,         FAYE Mars

## 2021-10-22 ENCOUNTER — OFFICE VISIT (OUTPATIENT)
Dept: SPORTS MEDICINE | Facility: CLINIC | Age: 56
End: 2021-10-22

## 2021-10-22 DIAGNOSIS — J04.0 LARYNGITIS: Primary | ICD-10-CM

## 2021-10-22 PROCEDURE — 99441 PR PHYS/QHP TELEPHONE EVALUATION 5-10 MIN: CPT | Performed by: FAMILY MEDICINE

## 2021-10-22 RX ORDER — PREDNISONE 20 MG/1
TABLET ORAL
Qty: 18 TABLET | Refills: 0 | Status: SHIPPED | OUTPATIENT
Start: 2021-10-22 | End: 2021-11-22

## 2021-10-22 RX ORDER — AMOXICILLIN 875 MG/1
875 TABLET, COATED ORAL 2 TIMES DAILY
Qty: 20 TABLET | Refills: 0 | Status: SHIPPED | OUTPATIENT
Start: 2021-10-22 | End: 2021-11-22

## 2021-10-22 NOTE — PROGRESS NOTES
Telephone Visit Note    Chief Complaint   Patient presents with   • Laryngitis     sore throat - congestion in lungs - coughing up mucus clear - x oct 18th          History of Present Illness  History per pt's son due to her losing her voice. He started with sore throat and congestion, then she got it as well. Sore throat, congestion, drainage this week. No fever. No HA. Tried nsaids and otc meds x 3 days.       Diagnoses and all orders for this visit:    Laryngitis  -     amoxicillin (AMOXIL) 875 MG tablet; Take 1 tablet by mouth 2 (Two) Times a Day.  -     predniSONE (DELTASONE) 20 MG tablet; Take 3 tabs daily for 3 days, then 2 daily for 3 days, then 1 daily for 3 days          This patient was evaluated by telephone due to current precautions with COVID-19.  Consent to telephone visit for this problem was given by the patient. I spent a total of 6 minutes on the phone with the patient.

## 2021-11-10 RX ORDER — CARVEDILOL 12.5 MG/1
TABLET ORAL
Qty: 60 TABLET | Refills: 3 | Status: SHIPPED | OUTPATIENT
Start: 2021-11-10 | End: 2022-03-17

## 2021-11-22 ENCOUNTER — OFFICE VISIT (OUTPATIENT)
Dept: SPORTS MEDICINE | Facility: CLINIC | Age: 56
End: 2021-11-22

## 2021-11-22 VITALS
HEART RATE: 98 BPM | HEIGHT: 57 IN | RESPIRATION RATE: 16 BRPM | TEMPERATURE: 97 F | BODY MASS INDEX: 25.67 KG/M2 | SYSTOLIC BLOOD PRESSURE: 140 MMHG | DIASTOLIC BLOOD PRESSURE: 82 MMHG | OXYGEN SATURATION: 99 % | WEIGHT: 119 LBS

## 2021-11-22 DIAGNOSIS — R63.4 WEIGHT LOSS, NON-INTENTIONAL: ICD-10-CM

## 2021-11-22 DIAGNOSIS — Z00.00 ANNUAL PHYSICAL EXAM: ICD-10-CM

## 2021-11-22 DIAGNOSIS — I10 BENIGN ESSENTIAL HTN: Primary | ICD-10-CM

## 2021-11-22 DIAGNOSIS — E78.2 MIXED HYPERLIPIDEMIA: ICD-10-CM

## 2021-11-22 PROCEDURE — 99214 OFFICE O/P EST MOD 30 MIN: CPT | Performed by: FAMILY MEDICINE

## 2021-11-22 RX ORDER — LOVASTATIN 40 MG/1
40 TABLET ORAL NIGHTLY
Qty: 90 TABLET | Refills: 1 | Status: SHIPPED | OUTPATIENT
Start: 2021-11-22 | End: 2022-06-09 | Stop reason: SDUPTHER

## 2021-11-22 NOTE — PROGRESS NOTES
"Savita is a 56 y.o. year old female    Chief Complaint   Patient presents with   • med check   f/u multiple problems    History of Present Illness   HPI   HTN - had f/u with cardiologist, all better now. Feels like affected by her mood - seems higher with stress/anxiety. Notes pulse rises frequently. Also question of white coat htn element - home BP readings all normal.     Also worried about losing weight and appetite. Notes she is eating better. 10 pounds over 1 year.     Review of Systems    /82 (BP Location: Right arm, Patient Position: Sitting, Cuff Size: Adult)   Pulse 98   Temp 97 °F (36.1 °C)   Resp 16   Ht 144.8 cm (57\")   Wt 54 kg (119 lb)   SpO2 99%   BMI 25.75 kg/m²          Physical Exam  Vitals reviewed.   Constitutional:       Appearance: She is well-developed.   Cardiovascular:      Rate and Rhythm: Normal rate and regular rhythm.      Heart sounds: Normal heart sounds.   Pulmonary:      Effort: Pulmonary effort is normal.      Breath sounds: Normal breath sounds.   Musculoskeletal:      Cervical back: Normal range of motion.   Skin:     General: Skin is warm and dry.   Neurological:      Mental Status: She is alert and oriented to person, place, and time.           Current Outpatient Medications:   •  carvedilol (COREG) 12.5 MG tablet, TAKE ONE TABLET BY MOUTH TWICE A DAY WITH MEALS, Disp: 60 tablet, Rfl: 3  •  chlorthalidone (HYGROTON) 25 MG tablet, Take 1 tablet by mouth Daily., Disp: 90 tablet, Rfl: 3  •  cholecalciferol (VITAMIN D3) 25 MCG (1000 UT) tablet, Take 1,000 Units by mouth Daily., Disp: , Rfl:   •  Cyanocobalamin (VITAMIN B-12 CR PO), Take  by mouth., Disp: , Rfl:   •  ferrous sulfate 325 (65 FE) MG tablet, Take 325 mg by mouth Daily With Breakfast., Disp: , Rfl:   •  fluticasone (FLONASE) 50 MCG/ACT nasal spray, SPRAY TWO SPRAYS IN EACH NOSTRIL ONCE DAILY, Disp: 16 g, Rfl: 11  •  lansoprazole (PREVACID) 30 MG capsule, Take 1 capsule by mouth Daily., Disp: 90 capsule, Rfl: " 3  •  loratadine (CLARITIN) 10 MG tablet, Take 1 tablet by mouth Daily. As needed for allergies, Disp: 90 tablet, Rfl: 3  •  lovastatin (MEVACOR) 40 MG tablet, Take 1 tablet by mouth Every Night., Disp: 90 tablet, Rfl: 1  •  vitamin E 100 UNIT capsule, Take 100 Units by mouth Daily., Disp: , Rfl:   •  Zinc 22.5 MG tablet, Take  by mouth., Disp: , Rfl:      Diagnoses and all orders for this visit:    Benign essential HTN    Weight loss, non-intentional    Mixed hyperlipidemia  -     lovastatin (MEVACOR) 40 MG tablet; Take 1 tablet by mouth Every Night.    Annual physical exam  -     CBC & Differential  -     Comprehensive Metabolic Panel  -     Lipid Panel With / Chol / HDL Ratio  -     Thyroid Cascade Profile  -     Urinalysis With Culture If Indicated -  -     Hemoglobin A1c  -     Vitamin D 25 Hydroxy  -     Vitamin B12    Other orders  -     Microscopic Examination -    Home blood pressures remain very stable.  Suspect she has a whitecoat element.  We will continue current medication and monitor carefully.  Continue treatment of lipids.  Routine screening labs due for physical, will also evaluate for possible sources of her fatigue and low-grade weight loss.        EMR Dragon/Transcription disclaimer:    Much of this encounter note is an electronic transcription/translation of spoken language to printed text.  The electronic translation of spoken language may permit erroneous, or at times, nonsensical words or phrases to be inadvertently transcribed.  Although I have reviewed the note for such errors some may still exist.

## 2021-11-23 LAB
25(OH)D3+25(OH)D2 SERPL-MCNC: 35.8 NG/ML (ref 30–100)
ALBUMIN SERPL-MCNC: 4.5 G/DL (ref 3.8–4.9)
ALBUMIN/GLOB SERPL: 1.9 {RATIO} (ref 1.2–2.2)
ALP SERPL-CCNC: 68 IU/L (ref 44–121)
ALT SERPL-CCNC: 20 IU/L (ref 0–32)
APPEARANCE UR: CLEAR
AST SERPL-CCNC: 19 IU/L (ref 0–40)
BACTERIA #/AREA URNS HPF: NORMAL /[HPF]
BASOPHILS # BLD AUTO: 0 X10E3/UL (ref 0–0.2)
BASOPHILS NFR BLD AUTO: 0 %
BILIRUB SERPL-MCNC: 0.2 MG/DL (ref 0–1.2)
BILIRUB UR QL STRIP: NEGATIVE
BUN SERPL-MCNC: 12 MG/DL (ref 6–24)
BUN/CREAT SERPL: 19 (ref 9–23)
CALCIUM SERPL-MCNC: 9.8 MG/DL (ref 8.7–10.2)
CASTS URNS QL MICRO: NORMAL /LPF
CHLORIDE SERPL-SCNC: 101 MMOL/L (ref 96–106)
CHOLEST SERPL-MCNC: 175 MG/DL (ref 100–199)
CHOLEST/HDLC SERPL: 3.3 RATIO (ref 0–4.4)
CO2 SERPL-SCNC: 26 MMOL/L (ref 20–29)
COLOR UR: YELLOW
CREAT SERPL-MCNC: 0.64 MG/DL (ref 0.57–1)
EOSINOPHIL # BLD AUTO: 0.1 X10E3/UL (ref 0–0.4)
EOSINOPHIL NFR BLD AUTO: 1 %
EPI CELLS #/AREA URNS HPF: NORMAL /HPF (ref 0–10)
ERYTHROCYTE [DISTWIDTH] IN BLOOD BY AUTOMATED COUNT: 15 % (ref 11.7–15.4)
GLOBULIN SER CALC-MCNC: 2.4 G/DL (ref 1.5–4.5)
GLUCOSE SERPL-MCNC: 97 MG/DL (ref 65–99)
GLUCOSE UR QL: NEGATIVE
HBA1C MFR BLD: 6.2 % (ref 4.8–5.6)
HCT VFR BLD AUTO: 42.8 % (ref 34–46.6)
HDLC SERPL-MCNC: 53 MG/DL
HGB BLD-MCNC: 13.3 G/DL (ref 11.1–15.9)
HGB UR QL STRIP: NEGATIVE
IMM GRANULOCYTES # BLD AUTO: 0 X10E3/UL (ref 0–0.1)
IMM GRANULOCYTES NFR BLD AUTO: 0 %
KETONES UR QL STRIP: NEGATIVE
LDLC SERPL CALC-MCNC: 105 MG/DL (ref 0–99)
LEUKOCYTE ESTERASE UR QL STRIP: NEGATIVE
LYMPHOCYTES # BLD AUTO: 2.2 X10E3/UL (ref 0.7–3.1)
LYMPHOCYTES NFR BLD AUTO: 26 %
MCH RBC QN AUTO: 23.8 PG (ref 26.6–33)
MCHC RBC AUTO-ENTMCNC: 31.1 G/DL (ref 31.5–35.7)
MCV RBC AUTO: 77 FL (ref 79–97)
MICRO URNS: NORMAL
MICRO URNS: NORMAL
MONOCYTES # BLD AUTO: 0.7 X10E3/UL (ref 0.1–0.9)
MONOCYTES NFR BLD AUTO: 8 %
NEUTROPHILS # BLD AUTO: 5.3 X10E3/UL (ref 1.4–7)
NEUTROPHILS NFR BLD AUTO: 65 %
NITRITE UR QL STRIP: NEGATIVE
PH UR STRIP: 7 [PH] (ref 5–7.5)
PLATELET # BLD AUTO: 370 X10E3/UL (ref 150–450)
POTASSIUM SERPL-SCNC: 3.4 MMOL/L (ref 3.5–5.2)
PROT SERPL-MCNC: 6.9 G/DL (ref 6–8.5)
PROT UR QL STRIP: NEGATIVE
RBC # BLD AUTO: 5.58 X10E6/UL (ref 3.77–5.28)
RBC #/AREA URNS HPF: NORMAL /HPF (ref 0–2)
SODIUM SERPL-SCNC: 142 MMOL/L (ref 134–144)
SP GR UR: 1.01 (ref 1–1.03)
TRIGL SERPL-MCNC: 95 MG/DL (ref 0–149)
TSH SERPL DL<=0.005 MIU/L-ACNC: 0.98 UIU/ML (ref 0.45–4.5)
URINALYSIS REFLEX: NORMAL
UROBILINOGEN UR STRIP-MCNC: 0.2 MG/DL (ref 0.2–1)
VIT B12 SERPL-MCNC: 1371 PG/ML (ref 232–1245)
VLDLC SERPL CALC-MCNC: 17 MG/DL (ref 5–40)
WBC # BLD AUTO: 8.4 X10E3/UL (ref 3.4–10.8)
WBC #/AREA URNS HPF: NORMAL /HPF (ref 0–5)

## 2021-12-20 ENCOUNTER — TELEPHONE (OUTPATIENT)
Dept: SPORTS MEDICINE | Facility: CLINIC | Age: 56
End: 2021-12-20

## 2021-12-20 NOTE — TELEPHONE ENCOUNTER
Son called in for patient wanting to know if you can send in medication for her, she has had diarrhea x 4 days, has tried taking herbal medications but has not tried imodium, wanted to know if you can send something in for her.     Thank you   Aziza

## 2021-12-20 NOTE — TELEPHONE ENCOUNTER
I called and spoke with son, informed of recommendations, all information was understood. Informed to let us know if sxs worsen.     Thanks  Aziza

## 2021-12-20 NOTE — TELEPHONE ENCOUNTER
Caller: Vinod Lucio    Relationship: Emergency Contact    Best call back number:     What is the best time to reach you: ANYTIME      What was the call regarding: THE PATIENT'S SON IS CALLING BECAUSE HE HAS NOT HEARD ANY UPDATE RGARDING MEDICATION FOR HIS MOM    Do you require a callback: YES

## 2022-01-05 ENCOUNTER — OFFICE VISIT (OUTPATIENT)
Dept: SPORTS MEDICINE | Facility: CLINIC | Age: 57
End: 2022-01-05

## 2022-01-05 VITALS
TEMPERATURE: 97.7 F | BODY MASS INDEX: 25.46 KG/M2 | WEIGHT: 118 LBS | HEIGHT: 57 IN | DIASTOLIC BLOOD PRESSURE: 60 MMHG | SYSTOLIC BLOOD PRESSURE: 118 MMHG | HEART RATE: 77 BPM | OXYGEN SATURATION: 99 % | RESPIRATION RATE: 16 BRPM

## 2022-01-05 DIAGNOSIS — M25.512 STERNOCLAVICULAR JOINT PAIN, LEFT: ICD-10-CM

## 2022-01-05 DIAGNOSIS — S49.92XA ACROMIOCLAVICULAR JOINT INJURY, LEFT, INITIAL ENCOUNTER: ICD-10-CM

## 2022-01-05 DIAGNOSIS — Z00.00 ANNUAL PHYSICAL EXAM: Primary | ICD-10-CM

## 2022-01-05 DIAGNOSIS — E61.1 IRON DEFICIENCY: ICD-10-CM

## 2022-01-05 PROCEDURE — 99396 PREV VISIT EST AGE 40-64: CPT | Performed by: FAMILY MEDICINE

## 2022-01-05 RX ORDER — FLUTICASONE PROPIONATE 50 MCG
2 SPRAY, SUSPENSION (ML) NASAL DAILY
Qty: 16 G | Refills: 11 | Status: SHIPPED | OUTPATIENT
Start: 2022-01-05 | End: 2023-03-22 | Stop reason: SDUPTHER

## 2022-01-05 NOTE — PROGRESS NOTES
"Savita Lucio is here today for an annual physical exam.     Recently had stomach pains and diarrhea while traveling home to [facility], one episode of vomiting; improved but now having diarrhea with meals frequently.     Left shoulder pain while exercising, comes and goes, notes L clavicle more prominent than R.   Trigger finger L thumb.    Reviewed labs - pre-DM, suspected iron deficiency    Persistent throat soreness, comes and goes, voice seems to come and go as well.     Had severe reaction to covid vaccines 1 and 2, worse than covid infection, concerned about getting booster.      Health Maintenance   Topic Date Due   • DXA SCAN  Never done   • TDAP/TD VACCINES (1 - Tdap) Never done   • HEPATITIS C SCREENING  Never done   • ZOSTER VACCINE (1 of 2) Never done   • INFLUENZA VACCINE  Never done   • MAMMOGRAM  10/19/2021   • COVID-19 Vaccine (3 - Booster) 10/22/2021   • ANNUAL PHYSICAL  11/18/2021   • Annual Gynecologic Pelvic and Breast Exam  11/25/2021   • LIPID PANEL  11/22/2022   • PAP SMEAR  11/24/2023   • COLORECTAL CANCER SCREENING  04/30/2028   • Pneumococcal Vaccine 0-64  Aged Out       Review of Systems    /60 (BP Location: Left arm, Patient Position: Sitting, Cuff Size: Adult)   Pulse 77   Temp 97.7 °F (36.5 °C) (Temporal)   Resp 16   Ht 144.8 cm (57.01\")   Wt 53.5 kg (118 lb)   SpO2 99%   BMI 25.53 kg/m²      Physical Exam    Vital signs reviewed.  General appearance: No acute distress  Eyes: conjunctiva clear without erythema; pupils equally round and reactive  ENT: external ears normal; hearing normal  Neck: supple; no thyromegaly  CV: normal rate and rhythm; no peripheral edema  Respiratory: normal respiratory effort; lungs clear to auscultation bilaterally  MSK: normal gait and station; no focal joint deformity or swelling  Skin: no rash or wounds; normal turgor  Neuro: cranial nerves 2-12 grossly intact; normal sensation to light touch  Psych: mood and affect normal; recent and " remote memory intact    Mild tenderness in the left acromioclavicular joint along with dominant sternoclavicular joint on the left side.  Normal range of motion.  Normal rotator cuff strength.      Current Outpatient Medications:   •  carvedilol (COREG) 12.5 MG tablet, TAKE ONE TABLET BY MOUTH TWICE A DAY WITH MEALS, Disp: 60 tablet, Rfl: 3  •  chlorthalidone (HYGROTON) 25 MG tablet, Take 1 tablet by mouth Daily., Disp: 90 tablet, Rfl: 3  •  cholecalciferol (VITAMIN D3) 25 MCG (1000 UT) tablet, Take 1,000 Units by mouth Daily., Disp: , Rfl:   •  Cyanocobalamin (VITAMIN B-12 CR PO), Take  by mouth., Disp: , Rfl:   •  ferrous sulfate 325 (65 FE) MG tablet, Take 325 mg by mouth Daily With Breakfast., Disp: , Rfl:   •  lansoprazole (PREVACID) 30 MG capsule, Take 1 capsule by mouth Daily., Disp: 90 capsule, Rfl: 3  •  loratadine (CLARITIN) 10 MG tablet, Take 1 tablet by mouth Daily. As needed for allergies, Disp: 90 tablet, Rfl: 3  •  lovastatin (MEVACOR) 40 MG tablet, Take 1 tablet by mouth Every Night., Disp: 90 tablet, Rfl: 1  •  vitamin E 100 UNIT capsule, Take 100 Units by mouth Daily., Disp: , Rfl:   •  Zinc 22.5 MG tablet, Take  by mouth., Disp: , Rfl:   •  fluticasone (Flonase) 50 MCG/ACT nasal spray, 2 sprays into the nostril(s) as directed by provider Daily., Disp: 16 g, Rfl: 11    Diagnoses and all orders for this visit:    1. Annual physical exam (Primary)    2. Sternoclavicular joint pain, left  -     Ambulatory Referral to Physical Therapy Evaluate and treat    3. Acromioclavicular joint injury, left, initial encounter  -     Ambulatory Referral to Physical Therapy Evaluate and treat    4. Iron deficiency  -     Iron and TIBC  -     Ferritin    Other orders  -     fluticasone (Flonase) 50 MCG/ACT nasal spray; 2 sprays into the nostril(s) as directed by provider Daily.  Dispense: 16 g; Refill: 11        Encourage healthy diet and exercise.  Encourage patient to stay up to date on screening examinations as  indicated based on age and risk factors.    Labs generally stable but notable for chronic microcytosis and low red cell count suspect she has persistent chronic iron deficiency will repeat iron panel today    Discussed prediabetes    GI symptoms sound like postinfectious lactose intolerance or postinfectious irritable bowel    EMR Dragon/Transcription disclaimer:    Much of this encounter note is an electronic transcription/translation of spoken language to printed text.  The electronic translation of spoken language may permit erroneous, or at times, nonsensical words or phrases to be inadvertently transcribed.  Although I have reviewed the note for such errors some may still exist.

## 2022-01-06 LAB
FERRITIN SERPL-MCNC: 515 NG/ML (ref 15–150)
IRON SATN MFR SERPL: 35 % (ref 15–55)
IRON SERPL-MCNC: 115 UG/DL (ref 27–159)
TIBC SERPL-MCNC: 326 UG/DL (ref 250–450)
UIBC SERPL-MCNC: 211 UG/DL (ref 131–425)

## 2022-01-06 NOTE — PROGRESS NOTES
Called patient via phone and verbally relayed results and recommendations per Dr. Crum. All information was verbally understood by the patient.     Thank you  Aziza

## 2022-02-08 ENCOUNTER — OFFICE VISIT (OUTPATIENT)
Dept: OBSTETRICS AND GYNECOLOGY | Facility: CLINIC | Age: 57
End: 2022-02-08

## 2022-02-08 VITALS
DIASTOLIC BLOOD PRESSURE: 76 MMHG | HEIGHT: 57 IN | BODY MASS INDEX: 26.15 KG/M2 | SYSTOLIC BLOOD PRESSURE: 112 MMHG | WEIGHT: 121.2 LBS

## 2022-02-08 DIAGNOSIS — Z01.419 ENCOUNTER FOR GYNECOLOGICAL EXAMINATION: Primary | ICD-10-CM

## 2022-02-08 DIAGNOSIS — Z01.419 PAP SMEAR, LOW-RISK: ICD-10-CM

## 2022-02-08 DIAGNOSIS — Z01.419 ROUTINE GYNECOLOGICAL EXAMINATION: ICD-10-CM

## 2022-02-08 DIAGNOSIS — Z13.9 SCREENING FOR CONDITION: ICD-10-CM

## 2022-02-08 DIAGNOSIS — N92.6 IRREGULAR PERIODS/MENSTRUAL CYCLES: ICD-10-CM

## 2022-02-08 DIAGNOSIS — N63.20 LEFT BREAST MASS: ICD-10-CM

## 2022-02-08 PROCEDURE — 99396 PREV VISIT EST AGE 40-64: CPT | Performed by: OBSTETRICS & GYNECOLOGY

## 2022-02-08 PROCEDURE — 81002 URINALYSIS NONAUTO W/O SCOPE: CPT | Performed by: OBSTETRICS & GYNECOLOGY

## 2022-02-08 PROCEDURE — 99213 OFFICE O/P EST LOW 20 MIN: CPT | Performed by: OBSTETRICS & GYNECOLOGY

## 2022-02-08 NOTE — PROGRESS NOTES
GYN Annual Exam     CC- Here for annual exam.     Savita Lucio is a 56 y.o. female who presents for annual well woman exam. Pt is having rare and sporadic cycles. She will go several months with no cycle and then have 5-6 days of bleeding. She had a cycle in  that lasted 10 days but not heavy. She is having vasomotor sx at night. She declines meds. Pt saw Dr Mesa for bilateral breast pain and her last visit was 3/2019. Pt reports intermittent breast pain. She is on vitamin E. Last MMG 10/2020 that resulted in a biopsy that was not concordant with her imaging. She was referred back to Dr Mesa's office to discuss excision. She did not show up to 2 appts made for her. Pt reports today that she did not know about the appts or recommendations. Last colonoscopy 2018.    OB History        1    Para   1    Term   1       0    AB   0    Living   1       SAB   0    IAB   0    Ectopic   0    Molar        Multiple   0    Live Births   1              Current contraception: none  History of abnormal Pap smear: no  History of abnormal mammogram: 10/2020. Had a biopsy- benign but biopsy was not concordant with imaging and surgical consult recommended.  Family history of uterine, colon or ovarian cancer: no  Family history of breast cancer: no    Health Maintenance   Topic Date Due   • DXA SCAN  Never done   • TDAP/TD VACCINES (1 - Tdap) Never done   • HEPATITIS C SCREENING  Never done   • ZOSTER VACCINE (1 of 2) Never done   • INFLUENZA VACCINE  Never done   • COVID-19 Vaccine (3 - Booster) 2021   • MAMMOGRAM  10/19/2021   • Annual Gynecologic Pelvic and Breast Exam  2021   • LIPID PANEL  2022   • ANNUAL PHYSICAL  2023   • PAP SMEAR  2025   • COLORECTAL CANCER SCREENING  2028   • Pneumococcal Vaccine 0-64  Aged Out       Past Medical History:   Diagnosis Date   • Abdominal pain    • Acid reflux 7/15/2016   • Anemia    • Breast pain    • Contraception    • Hemorrhoid     • History of Papanicolaou smear of cervix 2015   • HLD (hyperlipidemia) 7/15/2016   • Hypertension        Past Surgical History:   Procedure Laterality Date   •  SECTION     • HEMORRHOIDECTOMY           Current Outpatient Medications:   •  carvedilol (COREG) 12.5 MG tablet, TAKE ONE TABLET BY MOUTH TWICE A DAY WITH MEALS, Disp: 60 tablet, Rfl: 3  •  chlorthalidone (HYGROTON) 25 MG tablet, Take 1 tablet by mouth Daily., Disp: 90 tablet, Rfl: 3  •  cholecalciferol (VITAMIN D3) 25 MCG (1000 UT) tablet, Take 1,000 Units by mouth Daily., Disp: , Rfl:   •  Cyanocobalamin (VITAMIN B-12 CR PO), Take  by mouth., Disp: , Rfl:   •  ferrous sulfate 325 (65 FE) MG tablet, Take 325 mg by mouth Daily With Breakfast., Disp: , Rfl:   •  fluticasone (Flonase) 50 MCG/ACT nasal spray, 2 sprays into the nostril(s) as directed by provider Daily., Disp: 16 g, Rfl: 11  •  lansoprazole (PREVACID) 30 MG capsule, Take 1 capsule by mouth Daily., Disp: 90 capsule, Rfl: 3  •  loratadine (CLARITIN) 10 MG tablet, Take 1 tablet by mouth Daily. As needed for allergies, Disp: 90 tablet, Rfl: 3  •  lovastatin (MEVACOR) 40 MG tablet, Take 1 tablet by mouth Every Night., Disp: 90 tablet, Rfl: 1  •  vitamin E 100 UNIT capsule, Take 100 Units by mouth Daily., Disp: , Rfl:   •  Zinc 22.5 MG tablet, Take  by mouth., Disp: , Rfl:     No Known Allergies    Social History     Tobacco Use   • Smoking status: Never Smoker   • Smokeless tobacco: Never Used   Vaping Use   • Vaping Use: Never used   Substance Use Topics   • Alcohol use: No     Comment: denies caffeine use    • Drug use: No       Family History   Problem Relation Age of Onset   • Heart attack Mother    • Hyperlipidemia Father    • Hyperlipidemia Sister    • No Known Problems Son    • Cervical cancer Cousin        Review of Systems   Constitutional: Negative for appetite change, chills, fatigue, fever and unexpected weight change.   Gastrointestinal: Negative for abdominal distention,  "abdominal pain, anal bleeding, blood in stool, constipation, diarrhea, nausea and vomiting.   Genitourinary: Negative for dyspareunia, dysuria, menstrual problem, pelvic pain, vaginal bleeding, vaginal discharge and vaginal pain.       /76   Ht 144.8 cm (57.01\")   Wt 55 kg (121 lb 3.2 oz)   BMI 26.22 kg/m²     Physical Exam  Vitals reviewed.   Constitutional:       Appearance: She is well-developed.   HENT:      Mouth/Throat:      Dentition: Normal dentition. No dental caries.   Cardiovascular:      Rate and Rhythm: Normal rate and regular rhythm.      Heart sounds: Normal heart sounds.   Pulmonary:      Effort: Pulmonary effort is normal. No respiratory distress.      Breath sounds: Normal breath sounds. No stridor. No wheezing.   Abdominal:      General: There is no distension.      Palpations: Abdomen is soft. There is no mass.      Tenderness: There is no abdominal tenderness.   Musculoskeletal:         General: No tenderness. Normal range of motion.   Skin:     General: Skin is warm.      Coloration: Skin is not pale.      Findings: No erythema or rash.   Neurological:      Mental Status: She is alert and oriented to person, place, and time.      Cranial Nerves: No cranial nerve deficit.      Coordination: Coordination normal.   Psychiatric:         Behavior: Behavior normal.         Thought Content: Thought content normal.         Judgment: Judgment normal.            Assessment/Plan      1) GYN HM: Check pap smear. Last MMG 10/2020. SBE demonstrated and encouraged. Colonoscopy UTD.  2) Hx Bilateral mastodynia and abnormal MMG/biopsy: pt has had negative imaging. She has been seeing Dr Mesa for sx. She was last seen 3/2019. Dr Mesa thought her breast pain was related to her hormonal fluctuations. Recommended that pt take Vitamin E bid and to wear a suppportive bra with no underwire. No fu recommended unless symptoms worsen. Pt had abnormal MMG 10/2020 and had biopsy benign but referred back to " Dr Mesa to discuss excisional biopsy bc initial biopsy was not concordant with imaging. Pt had 2 appts with Dr Mesa that she never showed up for. Her  was called and told about the second appt. This was all told to pt today via  and she says she did not know anything about the abnormal biopsy or appts made for her. Will order a bilateral Dx MMg and Left breast US and then refer back to Dr Mesa again. Pt states she understands the plan.  3) Bone health - Weight bearing exercise, dietary calcium recommendations and vitamin D reviewed.   4) Diet and Exercise discussed  5) Smoking Status: nonsmoker  6) Perimenopause: pt is having irregular menstrual cycles.  She is also having intermittent vasomotor symptoms.  No HRT. Check FSH and estradiol. If labs reveal menopause then will proceed with TVUS  7) Follow up prn and 1 year       Diagnoses and all orders for this visit:    Encounter for gynecological examination  -     Mammo Screening Bilateral With CAD; Future    Screening for condition  -     POC Urinalysis Dipstick    Routine gynecological examination  -     IgP, Aptima HPV    Pap smear, low-risk  -     IgP, Aptima HPV    Irregular periods/menstrual cycles  -     Follicle Stimulating Hormone  -     Estradiol    Left breast mass  -     Mammo Diagnostic Bilateral With CAD; Future  -     US Breast Left Complete; Future        Alina Piper, DO  2/12/2022  17:33 EST

## 2022-02-09 LAB
ESTRADIOL SERPL-MCNC: 17.6 PG/ML
FSH SERPL-ACNC: 63.6 MIU/ML

## 2022-02-10 LAB
CYTOLOGIST CVX/VAG CYTO: NORMAL
CYTOLOGY CVX/VAG DOC CYTO: NORMAL
CYTOLOGY CVX/VAG DOC THIN PREP: NORMAL
DX ICD CODE: NORMAL
HIV 1 & 2 AB SER-IMP: NORMAL
HPV I/H RISK 4 DNA CVX QL PROBE+SIG AMP: NEGATIVE
OTHER STN SPEC: NORMAL
STAT OF ADQ CVX/VAG CYTO-IMP: NORMAL

## 2022-03-16 ENCOUNTER — HOSPITAL ENCOUNTER (OUTPATIENT)
Dept: ULTRASOUND IMAGING | Facility: HOSPITAL | Age: 57
Discharge: HOME | End: 2022-03-16

## 2022-03-16 ENCOUNTER — HOSPITAL ENCOUNTER (OUTPATIENT)
Dept: MAMMOGRAPHY | Facility: HOSPITAL | Age: 57
Discharge: HOME | End: 2022-03-16

## 2022-03-16 DIAGNOSIS — N63.20 LEFT BREAST MASS: ICD-10-CM

## 2022-03-16 PROCEDURE — 77066 DX MAMMO INCL CAD BI: CPT

## 2022-03-16 PROCEDURE — 76642 ULTRASOUND BREAST LIMITED: CPT

## 2022-03-16 PROCEDURE — G0279 TOMOSYNTHESIS, MAMMO: HCPCS

## 2022-03-17 RX ORDER — CARVEDILOL 12.5 MG/1
TABLET ORAL
Qty: 60 TABLET | Refills: 3 | Status: SHIPPED | OUTPATIENT
Start: 2022-03-17 | End: 2022-06-09 | Stop reason: SDUPTHER

## 2022-03-21 DIAGNOSIS — R92.8 ABNORMAL MAMMOGRAM: Primary | ICD-10-CM

## 2022-03-22 ENCOUNTER — TELEPHONE (OUTPATIENT)
Dept: SURGERY | Facility: CLINIC | Age: 57
End: 2022-03-22

## 2022-03-22 NOTE — TELEPHONE ENCOUNTER
Contacted patient via interpretor services for new patient apt with Dr. Mesa   Monday 4/25/22 9:00 arrival Josette left message for Ms. Lucio with apt details.     Requested [place] interpretor for patient day of visit in office.

## 2022-03-23 ENCOUNTER — TELEPHONE (OUTPATIENT)
Dept: SURGERY | Facility: CLINIC | Age: 57
End: 2022-03-23

## 2022-03-23 DIAGNOSIS — R92.8 ABNORMAL MAMMOGRAM: Primary | ICD-10-CM

## 2022-04-20 ENCOUNTER — HOSPITAL ENCOUNTER (OUTPATIENT)
Dept: MAMMOGRAPHY | Facility: HOSPITAL | Age: 57
Discharge: HOME | End: 2022-04-20
Admitting: SURGERY

## 2022-04-20 VITALS
RESPIRATION RATE: 16 BRPM | SYSTOLIC BLOOD PRESSURE: 130 MMHG | WEIGHT: 114 LBS | TEMPERATURE: 97.8 F | OXYGEN SATURATION: 100 % | BODY MASS INDEX: 24.59 KG/M2 | DIASTOLIC BLOOD PRESSURE: 81 MMHG | HEIGHT: 57 IN | HEART RATE: 79 BPM

## 2022-04-20 PROCEDURE — 88305 TISSUE EXAM BY PATHOLOGIST: CPT | Performed by: SURGERY

## 2022-04-20 PROCEDURE — 0 LIDOCAINE 1 % SOLUTION: Performed by: SURGERY

## 2022-04-20 RX ORDER — DIAZEPAM 5 MG/1
5 TABLET ORAL ONCE AS NEEDED
Status: DISCONTINUED | OUTPATIENT
Start: 2022-04-20 | End: 2022-04-21 | Stop reason: HOSPADM

## 2022-04-20 RX ORDER — LIDOCAINE HYDROCHLORIDE 10 MG/ML
1 INJECTION, SOLUTION INFILTRATION; PERINEURAL ONCE
Status: COMPLETED | OUTPATIENT
Start: 2022-04-20 | End: 2022-04-20

## 2022-04-20 RX ADMIN — Medication 1 ML: at 13:28

## 2022-04-20 RX ADMIN — LIDOCAINE HYDROCHLORIDE 20 ML: 10; .005 INJECTION, SOLUTION EPIDURAL; INFILTRATION; INTRACAUDAL; PERINEURAL at 13:29

## 2022-04-20 NOTE — NURSING NOTE
Patient with many concerns. Lori, the  accompanied patient today for procedure and interpreted all biopsy education and consent forms. Dr. Toro spent time explaining via the , the reason for this biopsy. Patient had confusion as to how the surgeon became involved and ordered the procedure without having seen her. I explained via the , that surgeons like to have as much information available to them, as possible, at the time of the initial visit to make a determination as to the plan. I spent one and a half hours prior to the procedure with the patient and  educating on the procedure, reason for the procedure, etc. The process of why she was back for another biopsy, ordered by a surgeon whom she has not met was quite confusing and concerning to her and required much clarification.

## 2022-04-20 NOTE — NURSING NOTE
Biopsy site to left medial lower breast clear with Dermabond dry and intact. No firmness or swelling noted at or around biopsy site. Denies pain. Ice pack with protective covering applied to biopsy site. Discharge instructions discussed with understanding voiced by patient through Lori, the . Copies provided to patient. No distress noted. To home via private vehicle accompanied by her  & Lori, the .

## 2022-04-20 NOTE — H&P
Name: Savita Lucio ADMIT: (Not on file)   : 1965  PCP: Diego Crum MD    MRN: [id number] LOS: 0 days   AGE/SEX: 56 y.o. female  ROOM: Room/bed info not found       Chief complaint left breast distortion    Present Illness or Internal History:  Patient is a 56 y.o. female presents with left breast distortion.     Past Surgical History:  Past Surgical History:   Procedure Laterality Date   •  SECTION     • HEMORRHOIDECTOMY         Past Medical History:  Past Medical History:   Diagnosis Date   • Abdominal pain    • Acid reflux 7/15/2016   • Anemia    • Breast pain    • Contraception    • Hemorrhoid    • History of Papanicolaou smear of cervix 2015   • HLD (hyperlipidemia) 7/15/2016   • Hypertension        Home Medications:  (Not in a hospital admission)      Allergies:  Patient has no known allergies.    Family History:  Family History   Problem Relation Age of Onset   • Heart attack Mother    • Hyperlipidemia Father    • Hyperlipidemia Sister    • No Known Problems Son    • Cervical cancer Cousin        Social History:  Social History     Tobacco Use   • Smoking status: Never Smoker   • Smokeless tobacco: Never Used   Vaping Use   • Vaping Use: Never used   Substance Use Topics   • Alcohol use: No     Comment: denies caffeine use    • Drug use: No        Objective     Physical Exam:    No exam performed today,    Vital Signs  BP: ()/()   Arterial Line BP: ()/()     Anticipated Surgical Procedure:  tomosynthesis guided left breast biopsy with clip placement    The risks, benefits and alternatives of this procedure have been discussed with the patient or responsible party: Yes        Thom Toro Jr., MD  22  07:29 EDT

## 2022-04-21 LAB
LAB AP CASE REPORT: NORMAL
PATH REPORT.FINAL DX SPEC: NORMAL
PATH REPORT.GROSS SPEC: NORMAL

## 2022-04-25 ENCOUNTER — TRANSCRIBE ORDERS (OUTPATIENT)
Dept: SURGERY | Facility: CLINIC | Age: 57
End: 2022-04-25

## 2022-04-25 ENCOUNTER — OFFICE VISIT (OUTPATIENT)
Dept: SURGERY | Facility: CLINIC | Age: 57
End: 2022-04-25

## 2022-04-25 ENCOUNTER — PREP FOR SURGERY (OUTPATIENT)
Dept: OTHER | Facility: HOSPITAL | Age: 57
End: 2022-04-25

## 2022-04-25 VITALS
HEART RATE: 88 BPM | WEIGHT: 121 LBS | BODY MASS INDEX: 26.1 KG/M2 | OXYGEN SATURATION: 98 % | SYSTOLIC BLOOD PRESSURE: 136 MMHG | HEIGHT: 57 IN | DIASTOLIC BLOOD PRESSURE: 84 MMHG

## 2022-04-25 DIAGNOSIS — N64.89 PSEUDOANGIOMATOUS STROMAL HYPERPLASIA OF BREAST: ICD-10-CM

## 2022-04-25 DIAGNOSIS — T14.8XXA HEMATOMA: ICD-10-CM

## 2022-04-25 DIAGNOSIS — N64.89 RADIAL SCAR OF LEFT BREAST: Primary | ICD-10-CM

## 2022-04-25 DIAGNOSIS — R92.8 ABNORMAL ULTRASOUND OF BREAST: ICD-10-CM

## 2022-04-25 DIAGNOSIS — R92.8 ABNORMAL MAMMOGRAM: ICD-10-CM

## 2022-04-25 DIAGNOSIS — Z78.9 LANGUAGE BARRIER TO COMMUNICATION: ICD-10-CM

## 2022-04-25 PROCEDURE — 99204 OFFICE O/P NEW MOD 45 MIN: CPT | Performed by: SURGERY

## 2022-04-25 RX ORDER — HYDROCODONE BITARTRATE AND ACETAMINOPHEN 5; 325 MG/1; MG/1
TABLET ORAL
Qty: 15 TABLET | Refills: 0 | Status: SHIPPED | OUTPATIENT
Start: 2022-04-25 | End: 2022-06-27

## 2022-04-25 RX ORDER — SODIUM CHLORIDE, SODIUM LACTATE, POTASSIUM CHLORIDE, CALCIUM CHLORIDE 600; 310; 30; 20 MG/100ML; MG/100ML; MG/100ML; MG/100ML
100 INJECTION, SOLUTION INTRAVENOUS CONTINUOUS
Status: CANCELLED | OUTPATIENT
Start: 2022-06-15

## 2022-04-25 RX ORDER — CEFAZOLIN SODIUM 2 G/100ML
2 INJECTION, SOLUTION INTRAVENOUS ONCE
Status: CANCELLED | OUTPATIENT
Start: 2022-06-15 | End: 2022-04-25

## 2022-04-25 RX ORDER — ONDANSETRON 4 MG/1
4 TABLET, FILM COATED ORAL EVERY 8 HOURS PRN
Qty: 10 TABLET | Refills: 1 | Status: SHIPPED | OUTPATIENT
Start: 2022-04-25 | End: 2022-06-27

## 2022-04-25 RX ORDER — DIAZEPAM 5 MG/1
10 TABLET ORAL ONCE
Status: CANCELLED | OUTPATIENT
Start: 2022-06-15 | End: 2022-04-25

## 2022-04-25 NOTE — PROGRESS NOTES
Chief Complaint: Savita Obrien is a 56 y.o. female who was seen in consultation at the request of Alina Piper DO  for abnormal breast imaging and breast pain    History of Present Illness:  Patient presents with breast pain. She noted no new masses, skin changes, nipple discharge, nipple changes prior to her most recent imaging. Paino started 6 years ago when she started the depo provera and is described as a burning pain whole breast, behind nipples, worse when touched.  She stopped the depo provera in 2017 and the pain has been worse since the new year, February through April and starting May has improved somewhat.  She wears a bra most of the time.  SHe takes vitamin E, 400 units once daily and has taken this since she first started having the pain 6 years ago with starting the depo.    Her most recent imaging includes the followin/30/17 [facility]   REMA DIG SAVITA CHILD  Scattered fibroglandular densities. Middle third of the upper-inner quadrant of the right breast, there is an area of focal asymmetry.  Impression: Further evaluation with spot compression and possible targeted right breast sonography.  BIRADS Category 0.    17 E  RT DIAG MAMMO    SAVITA OBRIEN    With additional imagining there is resolution of the area of foal asymmetry in the upper inner quadrant of the right breast.  BIRADS Category 1: Negative.    18           [facility] Bilateral Mammo Screening Tomosynthesis                        Savita Obrien  Scattered fibroglandular densities. Posterior one-third lower outer quadrant of the right breast,  There has been interval development of microcalcifications.  Impression  There are no findings suspicious for malignancy in the left breast.  Birads Category 0      3/6/18  [facility]  RT DIAG MAMMO    SAVITA OBRIEN  Unilateral right digital spot magnification. There are punctuate monomorphic scattered regional microcalcification seen in the right breast. They  have a stable appearance.  BIRADS Category 2: Benign findings.    She has not had a breast biopsy in the past.  She has her uterus and ovaries, is ? perimenopausal, and takes nor hormones. She stopped the depo in November and tells me that she had a little spoting in March and in May. No other.    Her family history includes the following: She has no daughters, 4 sisters, 4 maternal aunts, 4 paternal aunts.  No breast or ovarian cancer in her family.    She drinks an occasional coffee and an occasional cola.      In the interim,  Savita Lucio  has done well.  She has noted no changes in her breast exam. No new masses, skin changes, nipple changes, nipple discharge either breast.     She tells me that she has no breast pain for weeks and then may have excruciating pain for a few days.    She reports not drinking caffeine. She reports significant stress. SHe reports spotty periods some months for 2-3 days and then no periods for months.    Her most recent imaging includes the following:  Bilateral screening mammogram with 3D [facility] on 2- 14- 2019: Heterogeneously dense tissue bilaterally.  BI-RADS 2        Interval HIstory:  Mrs Lucio was last seen in our office in 3-14-19.  She is here today as a new patient/new problem.  In the interim, in February 20, 2020 she had a screening mammogram that showed an asymmetry in the middle one third upper inner quadrant left breast.  She returned March 6, 2020 and have a diagnostic mammogram and ultrasound stating that the distortion became less apparent.  The ultrasound showed several benign cyst and recommended at follow-up mammogram.  October 6, 2020 she had a left diagnostic mammogram and an ultrasound to the 7:00, 3 cm from the nipple location 7 mm lesion that was then biopsied and showed nodular patch a bowtie marker was left and felt to be in good position but the biopsy was felt to be discordant and they recommended a surgical consultation.  She was referred back to us and  I have several notes in the chart from scheduling where the office spoke with the patient's  and the patient no showed for visits multiple times.  I believe at that time we called her gynecologist office and let her know that the patient had not showed for several visits.    The patient then had a bilateral diagnostic mammogram and ultrasound for follow-up of this area and it showed a persistent area of architectural distortion with a bowtie being displaced 1.2 cm from the distortion.  Recommendation was made for surgical excision.  At that time I ordered a left repeat stereotactic biopsy to target the distortion.  This was done 2022 and returned as radial scar with a tri-Bell marker left in good position at the 9:00 location.  She reports significant bruising since the biopsy.  She denies any redness warmth or drainage.  She is here for review.      She denies any new breast masses, skin changes, nipple changes, nipple discharge either side.    Review of Systems:  Review of Systems   Constitutional: Negative for unexpected weight change (9 lb wt loss).   All other systems reviewed and are negative.       Past Medical and Surgical History:  Breast Biopsy History:  Patient has not had a breast biopsy in the past.  Breast Cancer HIstory:  Patient does not have a past medical history of breast cancer.  Breast Operations, and year:  NONE   Obstetric/Gynecologic History:  Age menstrual periods began: 15  Patient is postmenopausal, entered menopause naturally at age:    Number of pregnancies:1   Number of live births: 1  Number of abortions or miscarriages: 0  Age of delivery of first child: 35  Patient breast fed, for the following lenth of time: 6 MONTHS   Length of time taking birth control pills: NO   Patient has never taken hormone replacement  PATIENT STILL HAS UTERUS AND OVARIES     Past Surgical History:   Procedure Laterality Date   • BREAST BIOPSY Left     Benign   •  SECTION     •  "CYSTECTOMY Left     Below left buttock   • HEMORRHOIDECTOMY       Past Medical History:   Diagnosis Date   • Abdominal pain    • Acid reflux 7/15/2016   • Anemia    • Breast pain    • Contraception    • Hemorrhoid    • History of Papanicolaou smear of cervix 03/17/2015   • HLD (hyperlipidemia) 7/15/2016   • Hypertension        Prior Hospitalizations, other than for surgery or childbirth, and year:  NONE     Social History     Socioeconomic History   • Marital status:    Tobacco Use   • Smoking status: Never Smoker   • Smokeless tobacco: Never Used   Vaping Use   • Vaping Use: Never used   Substance and Sexual Activity   • Alcohol use: No     Comment: denies caffeine use    • Drug use: No   • Sexual activity: Yes     Patient is .  Patient is a homemaker.  Patient does not drink caffeine.    Family History:  Family History   Problem Relation Age of Onset   • Heart attack Mother    • Hyperlipidemia Father    • Hyperlipidemia Sister    • No Known Problems Son    • Cervical cancer Cousin        Vital Signs:  /84   Pulse 88   Ht 144.8 cm (57.01\")   Wt 54.9 kg (121 lb)   LMP  (LMP Unknown)   SpO2 98%   Breastfeeding No   BMI 26.18 kg/m²      Medications:    Current Outpatient Medications:   •  carvedilol (COREG) 12.5 MG tablet, TAKE ONE TABLET BY MOUTH TWICE A DAY WITH MEALS, Disp: 60 tablet, Rfl: 3  •  chlorthalidone (HYGROTON) 25 MG tablet, Take 1 tablet by mouth Daily., Disp: 90 tablet, Rfl: 3  •  cholecalciferol (VITAMIN D3) 25 MCG (1000 UT) tablet, Take 1,000 Units by mouth Daily., Disp: , Rfl:   •  COLLAGEN-VITAMIN C PO, Take  by mouth Daily., Disp: , Rfl:   •  ferrous sulfate 325 (65 FE) MG tablet, Take 325 mg by mouth Daily With Breakfast., Disp: , Rfl:   •  fluticasone (Flonase) 50 MCG/ACT nasal spray, 2 sprays into the nostril(s) as directed by provider Daily., Disp: 16 g, Rfl: 11  •  loratadine (CLARITIN) 10 MG tablet, Take 1 tablet by mouth Daily. As needed for allergies, Disp: 90 " "tablet, Rfl: 3  •  lovastatin (MEVACOR) 40 MG tablet, Take 1 tablet by mouth Every Night., Disp: 90 tablet, Rfl: 1  •  vitamin E 1000 UNIT capsule, Take 100 Units by mouth Daily., Disp: , Rfl:   •  Zinc 22.5 MG tablet, Take  by mouth., Disp: , Rfl:   •  lansoprazole (PREVACID) 30 MG capsule, Take 1 capsule by mouth Daily., Disp: 90 capsule, Rfl: 3     Allergies:  No Known Allergies    Physical Examination:  /84   Pulse 88   Ht 144.8 cm (57.01\")   Wt 54.9 kg (121 lb)   LMP  (LMP Unknown)   SpO2 98%   Breastfeeding No   BMI 26.18 kg/m²   General Appearance:  Patient is in no distress.  She is well kept and has an average build.   Psychiatric:  Patient with appropriate mood and affect. Alert and oriented to self, time, and place.    Breast, RIGHT:  small sized, symmetric with the contralateral side.  Breast skin is without erythema, edema, rashes.  There are no visible abnormalities upon inspection during the arm-raising maneuver or with hands on hips in the sitting position. There is no nipple retraction, discharge or nipple/areolar skin changes.There are no masses palpable in the sitting or supine positions.      Breast, LEFT:  small sized, symmetric with the contralateral side.  Breast skin is without erythema, edema, rashes.  There are no visible abnormalities upon inspection during the arm-raising maneuver or with hands on hips in the sitting position. There is no nipple retraction, discharge or nipple/areolar skin changes. Large palpable tender hematoma inferior/LIQ LEFT breast at biopsy site. Well healing mammotomies. There are no other masses palpable in the sitting or supine positions.     Lymphatic:  There is no axillary, cervical, infraclavicular, or supraclavicular adenopathy bilaterally.  Eyes:  Pupils are round and reactive to light.  Cardiovascular:  Heart rate and rhythm are regular.  Respiratory:  Lungs are clear bilaterally with no crackles or wheezes in any lung " field.  Gastrointestinal:  Abdomen is soft, nondistended, and nontender. .    Musculoskeletal:  Good strength in all 4 extremities.   There is good range of motion in both shoulders.    Skin:  No new skin lesions or rashes on the skin excluding the breast (see breast exam above).      Imagin16   [facility]  REMA SCREENING MAMMO  CAMILLE, SAVITA  Scattered fibroglandular densities.  BIRADS category 1: Negative    17 [facility]   REMA DIG MAMMO  CAMILLE, SAVITA  Scattered fibroglandular densities. Middle third of the upper-inner quadrant of the right breast, there is an area of focal asymmetry.  Impression: Further evaluation with spot compression and possible targeted right breast sonography.  BIRADS Category 0.    17 E  RT DIAG MAMMO    SAVITA OBRIEN  With additional imagining there is resolution of the area of foal asymmetry in the upper inner quadrant of the right breast.  BIRADS Category 1: Negative.    18           [facility] Bilateral Mammo Screening Tomosynthesis                        Savita Obrien  Scattered fibroglandular densities. Posterior one-third lower outer quadrant of the right breast,  There has been interval development of microcalcifications.  Impression  There are no findings suspicious for malignancy in the left breast.  Birads Category 0      3/6/18  E  RT DIAG MAMMO    SAVITA OBRIEN  Unilateral right digital spot magnification. There are punctuate monomorphic scattered regional microcalcification seen in the right breast. They have a stable appearance.  BIRADS Category 2: Benign findings.    Bilateral screening mammogram with 3D [facility] on 2019: Heterogeneously dense tissue bilaterally.  BI-RADS 2    2019 BILATERAL MAMMO WITH KONSTANTIN       [facility]        SAVITA CAMILLE  Heterogeneously dense.  BI-RADS Category 2.    2020      BILATERAL SCREENING MAMMO WITH K[facility]IN    [facility]       SAVITA CAMILLE  Heterogeneously dense. Middle third upper inner quadrant left breast  focal asymmetry.  BI-RADS Category 0.      03/06/2020 LEFT DIAGNOSTIC MAMMO      TASIA OBRIEN  The area of architectural distortion in the left breast becomes somewhat less apparent. Ultrasound of the upper inner quadrant of the left breast shows several small benign-appearing cysts largest measuring up to approximately 9 mm in size. No solid appearing masses or other sonographic evidence of malignancy is seen. Recommend repeat left breast mammogram only in 6 months.  BR-RADS Category 3: Probably benign findings.      10/06/2020    LEFT DIAGNOSTIC MAMMO WITH KONSTANTIN & LEFT BREAST US    TASIA OBRIEN     MMG:  With additional imaging, there is persistence of an area of architectural distortion in the middle third medial aspect of left breast.    US:  At the 7:00 position on the order of 3 cm from nipple, there is an ill-defined heterogeneous hypoechoic lesion that measures 0.7 cm in greatest dimension. This corresponds to the mammographically visualized area of distortion.  BI-RADS Category 4.    03/16/2022    BILATERAL DIAGNOSTIC MAMMO WITH LIMITED LEFT BREAST US   TASIA OBRIEN  MMG:  Heterogeneously dense. There is a persistent area of architectural distortion seen in the middle third lower inner quadrant of the left breast. The bowtie-shaped metallic clip is positioned on the order of 1.2 cm medial to the epicenter of the area of distortion. Radiating spicules from the epicenter of distortion are noted.  US:  7 o’clock 2 cm from the nipple, there is a 0.9 x 0.4 x 0.4 cm ill-defined heterogenous hypoechoic region with distortion.   IMPRESSION:  No definite progression since the prior examinations from 2020 is appreciated. However, there remains suspicious regarding the etiology of the architectural distortion. Surgical excision of the area of distortion in the left breast is recommended.  BI-RADS Category 4.    Pathology:   10/19/2020 LEFT US GUIDED BREAST BIOPSY      TASIA OBRIEN    7:00, 3 cm from the nipple in the left breast. 11 g needle. 7 core specimens. A bowtie clip. Bowtie clip at the expected location. Pathology is benign but does not explain distortion and is considered discordant.    PATHOLOGY:  1. Left Breast, 7 o’clock, 3 cm from Nipple, Ultrasound-Guided Biopsies for a 0.7 cm Lesion:  A. Nodular pseudoangiomatous stromal hyperplasia (PASH).  B. Dilated ducts and fragments of cyst wall.  C. No atypical hyperplasia, in situ, nor invasive carcinoma identified.    04/20/2022 MAMMO STEREOTACTIC BREAST BIOPSY       [facility]     LORI OBRIEN  Left breast. 8 gauge Mammotome. Multiple tissue specimens. Tri bell-shaped metallic clip was placed to honey the site. Postbiopsy mammography of the left breast demonstrates placement of a metallic clip at the 9 o’clock position at the site of the pre-existing architectural distortion without evidence for clip migration. The pathology is concordant.  FINAL DIAGNOSIS:  1. Left Breast, 9:00, Stereotactic-Guided Core Needle Biopsy for an Architectural Distortion:  A. Radial scar involved by usual ductal hyperplasia and benign calcifications.    Procedures:      Assessment:   Diagnosis Plan   1. Radial scar of left breast     2. Abnormal mammogram     3. Abnormal ultrasound of breast     4. Pseudoangiomatous stromal hyperplasia of breast     5. Hematoma     6. Language barrier to communication        1-2  LEFT radial scar 9:00, tribell marker-       3-4  PASH, 10-19-20- nodular pash on ultrasound biopsy done for abnormal mammogram- bowtie left in place but felt discordant      5-  Large hematoma LEFT LIQ from stereo 4-20-22    6-  [place] speaking. Had multiple no shows to the office, uncertain as to whether this is from miscommunication due to language barrier. Has anxiety about healthcare due to language barrier.        Plan:  Mrs. Obrien, the  and I reviewed in detail her interval history, symptoms, imaging, imaging reports, pathology reports  and examination today.      I believe that there are miscommunications related to her language barriers that are problematic and have asked her to please have a  with her any time she seeks healthcare or has phone calls regarding healthcare.   She missed several appointments/documented despite confirming with her  , whom she tells me speak some english. I have asked her to please have a  in addition to her  moving forward int he interest of her safety.    We reviewed her past and present imaging and pathology in 2020 and 2022 respectively.    We reviewed the nature of radial scar and the recommendation for excision for this to rule out a nearby breast cancer and also to eliminate the imaging finding.  We discussed the alternative of not excising this but this was not my recommendation.  She would like to have this excised but she would like to do this in June when her  can be available.  Again I reminded her that we would be happy to schedule in June so that her  could be there but that she should also have someone who can do proper translation for her.      We discussed the procedure of a left breast needle localized excision of radial scar.  I did not schedule it with a Marilyn due to the large hematoma that can sometimes cause the Marilyn's to malfunction.  I will call in her prescription for Lortab and Zofran.  I did remind her that this was for her surgery and to pick this up now because by June and they will put it back on the shelf.  I also told her that this is not for the hematoma for the hematoma I would recommend ice, supportive bra and Tylenol or ibuprofen.  Her next routine mammogram will be due March 7, 2023 at [facility].  A left mammogram would be due September 2022 for follow-up of the excision of the abnormal imaging finding.    See her back for surgery.  Kerline Mesa MD        We have spent 45 minutes in face to face visit today, 35 in face to  face .      Next Appointment:  Return for surgery.      EMR Dragon/transcription disclaimer:    Much of this encounter note is an electronic transcription/translocation of spoken language to printed text.  The electronic translation of spoken language may permit erroneous, or at times, nonsensical words or phrases to be inadvertently transcribed.  Although I have reviewed the note from such areas, some may still exist.

## 2022-05-03 ENCOUNTER — APPOINTMENT (OUTPATIENT)
Dept: MAMMOGRAPHY | Facility: HOSPITAL | Age: 57
End: 2022-05-03

## 2022-05-06 ENCOUNTER — TELEPHONE (OUTPATIENT)
Dept: SURGERY | Facility: CLINIC | Age: 57
End: 2022-05-06

## 2022-05-06 NOTE — TELEPHONE ENCOUNTER
I just received a call from Savita Lucio's son Calvin  patient had stereo bx LT here at [facility] on 4/20/22  she is c/o left breast swelling and some tenderness.     no redness, no heat to touch and no d/c from mammotomy site. site seems to be well healed.   patient has not been wearing supportive bra  she agreed to wear supportive bra, use ibuprofen OTC and heat or cold, not directly on skin.     Calvin agreed to call back if this does not improve.

## 2022-06-01 ENCOUNTER — TELEPHONE (OUTPATIENT)
Dept: SURGERY | Facility: CLINIC | Age: 57
End: 2022-06-01

## 2022-06-01 NOTE — TELEPHONE ENCOUNTER
Patient scheduled for LT breast excision for Radial Scar on 6/15/22.     She let us know that she still has hardness and bruising and tenderness at her 4/20/22 LT biopsy site,   she wanted to let you know and make sure this will not interfere with her surgery.     She agreed to wear a supportive bra and use tylenol for discomfort.

## 2022-06-08 ENCOUNTER — TELEPHONE (OUTPATIENT)
Dept: SURGERY | Facility: CLINIC | Age: 57
End: 2022-06-08

## 2022-06-08 ENCOUNTER — PRE-ADMISSION TESTING (OUTPATIENT)
Dept: PREADMISSION TESTING | Facility: HOSPITAL | Age: 57
End: 2022-06-08

## 2022-06-08 VITALS
HEIGHT: 61 IN | WEIGHT: 116.7 LBS | TEMPERATURE: 98.9 F | BODY MASS INDEX: 22.03 KG/M2 | OXYGEN SATURATION: 98 % | DIASTOLIC BLOOD PRESSURE: 88 MMHG | RESPIRATION RATE: 20 BRPM | SYSTOLIC BLOOD PRESSURE: 145 MMHG | HEART RATE: 87 BPM

## 2022-06-08 DIAGNOSIS — N64.89 RADIAL SCAR OF LEFT BREAST: ICD-10-CM

## 2022-06-08 LAB
ANION GAP SERPL CALCULATED.3IONS-SCNC: 13 MMOL/L (ref 5–15)
BASOPHILS # BLD AUTO: 0.04 10*3/MM3 (ref 0–0.2)
BASOPHILS NFR BLD AUTO: 0.8 % (ref 0–1.5)
BUN SERPL-MCNC: 9 MG/DL (ref 6–20)
BUN/CREAT SERPL: 15.3 (ref 7–25)
CALCIUM SPEC-SCNC: 9.6 MG/DL (ref 8.6–10.5)
CHLORIDE SERPL-SCNC: 101 MMOL/L (ref 98–107)
CO2 SERPL-SCNC: 28 MMOL/L (ref 22–29)
CREAT SERPL-MCNC: 0.59 MG/DL (ref 0.57–1)
DEPRECATED RDW RBC AUTO: 40.5 FL (ref 37–54)
EGFRCR SERPLBLD CKD-EPI 2021: 107.3 ML/MIN/1.73
EOSINOPHIL # BLD AUTO: 0.05 10*3/MM3 (ref 0–0.4)
EOSINOPHIL NFR BLD AUTO: 1 % (ref 0.3–6.2)
ERYTHROCYTE [DISTWIDTH] IN BLOOD BY AUTOMATED COUNT: 14.8 % (ref 12.3–15.4)
GLUCOSE SERPL-MCNC: 116 MG/DL (ref 65–99)
HCG SERPL QL: NEGATIVE
HCT VFR BLD AUTO: 43.5 % (ref 34–46.6)
HGB BLD-MCNC: 13.2 G/DL (ref 12–15.9)
IMM GRANULOCYTES # BLD AUTO: 0.01 10*3/MM3 (ref 0–0.05)
IMM GRANULOCYTES NFR BLD AUTO: 0.2 % (ref 0–0.5)
LYMPHOCYTES # BLD AUTO: 1.53 10*3/MM3 (ref 0.7–3.1)
LYMPHOCYTES NFR BLD AUTO: 30 % (ref 19.6–45.3)
MCH RBC QN AUTO: 23.3 PG (ref 26.6–33)
MCHC RBC AUTO-ENTMCNC: 30.3 G/DL (ref 31.5–35.7)
MCV RBC AUTO: 76.7 FL (ref 79–97)
MONOCYTES # BLD AUTO: 0.39 10*3/MM3 (ref 0.1–0.9)
MONOCYTES NFR BLD AUTO: 7.6 % (ref 5–12)
NEUTROPHILS NFR BLD AUTO: 3.08 10*3/MM3 (ref 1.7–7)
NEUTROPHILS NFR BLD AUTO: 60.4 % (ref 42.7–76)
NRBC BLD AUTO-RTO: 0 /100 WBC (ref 0–0.2)
PLATELET # BLD AUTO: 326 10*3/MM3 (ref 140–450)
PMV BLD AUTO: 10.2 FL (ref 6–12)
POTASSIUM SERPL-SCNC: 3.3 MMOL/L (ref 3.5–5.2)
QT INTERVAL: 392 MS
RBC # BLD AUTO: 5.67 10*6/MM3 (ref 3.77–5.28)
SODIUM SERPL-SCNC: 142 MMOL/L (ref 136–145)
WBC NRBC COR # BLD: 5.1 10*3/MM3 (ref 3.4–10.8)

## 2022-06-08 PROCEDURE — 84703 CHORIONIC GONADOTROPIN ASSAY: CPT

## 2022-06-08 PROCEDURE — 93010 ELECTROCARDIOGRAM REPORT: CPT | Performed by: INTERNAL MEDICINE

## 2022-06-08 PROCEDURE — 85025 COMPLETE CBC W/AUTO DIFF WBC: CPT

## 2022-06-08 PROCEDURE — 80048 BASIC METABOLIC PNL TOTAL CA: CPT

## 2022-06-08 PROCEDURE — 36415 COLL VENOUS BLD VENIPUNCTURE: CPT

## 2022-06-08 PROCEDURE — 93005 ELECTROCARDIOGRAM TRACING: CPT

## 2022-06-08 NOTE — DISCHARGE INSTRUCTIONS
Take the following medications the morning of surgery:  CARVEDILOL    ARRIVE TO MAIN OR DESK 6/14/22 AT 10:20AM    If you are on prescription narcotic pain medication to control your pain you may also take that medication the morning of surgery.    General Instructions:  Do not eat solid food after midnight the night before surgery.  You may drink clear liquids day of surgery but must stop at least one hour before your hospital arrival time.  It is beneficial for you to have a clear drink that contains carbohydrates the day of surgery.  We suggest a 12 to 20 ounce bottle of Gatorade or Powerade for non-diabetic patients or a 12 to 20 ounce bottle of G2 or Powerade Zero for diabetic patients. (Pediatric patients, are not advised to drink a 12 to 20 ounce carbohydrate drink)    Clear liquids are liquids you can see through.  Nothing red in color.     Plain water                               Sports drinks  Sodas                                   Gelatin (Jell-O)  Fruit juices without pulp such as white grape juice and apple juice  Popsicles that contain no fruit or yogurt  Tea or coffee (no cream or milk added)  Gatorade / Powerade  G2 / Powerade Zero    Infants may have breast milk up to four hours before surgery.  Infants drinking formula may drink formula up to six hours before surgery.   Patients who avoid smoking, chewing tobacco and alcohol for 4 weeks prior to surgery have a reduced risk of post-operative complications.  Quit smoking as many days before surgery as you can.  Do not smoke, use chewing tobacco or drink alcohol the day of surgery.   If applicable bring your C-PAP/ BI-PAP machine.  Bring any papers given to you in the doctor’s office.  Wear clean comfortable clothes.  Do not wear contact lenses, false eyelashes or make-up.  Bring a case for your glasses.   Bring crutches or walker if applicable.  Remove all piercings.  Leave jewelry and any other valuables at home.  Hair extensions with metal clips  must be removed prior to surgery.  The Pre-Admission Testing nurse will instruct you to bring medications if unable to obtain an accurate list in Pre-Admission Testing.        Preventing a Surgical Site Infection:  For 2 to 3 days before surgery, avoid shaving with a razor because the razor can irritate skin and make it easier to develop an infection.    Any areas of open skin can increase the risk of a post-operative wound infection by allowing bacteria to enter and travel throughout the body.  Notify your surgeon if you have any skin wounds / rashes even if it is not near the expected surgical site.  The area will need assessed to determine if surgery should be delayed until it is healed.  The night prior to surgery shower using a fresh bar of anti-bacterial soap (such as Dial) and clean washcloth.  Sleep in a clean bed with clean clothing.  Do not allow pets to sleep with you.  Shower on the morning of surgery using a fresh bar of anti-bacterial soap (such as Dial) and clean washcloth.  Dry with a clean towel and dress in clean clothing.  Ask your surgeon if you will be receiving antibiotics prior to surgery.  Make sure you, your family, and all healthcare providers clean their hands with soap and water or an alcohol based hand  before caring for you or your wound.    Day of surgery:  Your arrival time is approximately two hours before your scheduled surgery time.  Upon arrival, a Pre-op nurse and Anesthesiologist will review your health history, obtain vital signs, and answer questions you may have.  The only belongings needed at this time will be a list of your home medications and if applicable your C-PAP/BI-PAP machine.  A Pre-op nurse will start an IV and you may receive medication in preparation for surgery, including something to help you relax.     Please be aware that surgery does come with discomfort.  We want to make every effort to control your discomfort so please discuss any uncontrolled  symptoms with your nurse.   Your doctor will most likely have prescribed pain medications.      If you are going home after surgery you will receive individualized written care instructions before being discharged.  A responsible adult must drive you to and from the hospital on the day of your surgery and stay with you for 24 hours.  Discharge prescriptions can be filled by the hospital pharmacy during regular pharmacy hours.  If you are having surgery late in the day/evening your prescription may be e-prescribed to your pharmacy.  Please verify your pharmacy hours or chose a 24 hour pharmacy to avoid not having access to your prescription because your pharmacy has closed for the day.    If you are staying overnight following surgery, you will be transported to your hospital room following the recovery period.  [facility] has all private rooms.    If you have any questions please call Pre-Admission Testing at [phone].  Deductibles and co-payments are collected on the day of service. Please be prepared to pay the required co-pay, deductible or deposit on the day of service as defined by your plan.    Patient Education for Self-Quarantine Process    Following your COVID testing, we strongly recommend that you wear a mask when you are with other people and practice social distancing.   Limit your activities to only required outings.  Wash your hands with soap and water frequently for at least 20 seconds.   Avoid touching your eyes, nose and mouth with unwashed hands.  Do not share anything - utensils, drinking glasses, food from the same bowl.   Sanitize household surfaces daily. Include all high touch areas (door handles, light switches, phones, countertops, etc.)    Call your surgeon immediately if you experience any of the following symptoms:  Sore Throat  Shortness of Breath or difficulty breathing  Cough  Chills  Body soreness or muscle pain  Headache  Fever  New loss of taste or smell  Do not  arrive for your surgery ill.  Your procedure will need to be rescheduled to another time.  You will need to call your physician before the day of surgery to avoid any unnecessary exposure to hospital staff as well as other patients.       CHLORHEXIDINE CLOTH INSTRUCTIONS  The morning of surgery follow these instructions using the Chlorhexidine cloths you've been given.  These steps reduce bacteria on the body.  Do not use the cloths near your eyes, ears mouth, genitalia or on open wounds.  Throw the cloths away after use but do not try to flush them down a toilet.      Open and remove one cloth at a time from the package.    Leave the cloth unfolded and begin the bathing.  Massage the skin with the cloths using gentle pressure to remove bacteria.  Do not scrub harshly.   Follow the steps below with one 2% CHG cloth per area (6 total cloths).  One cloth for neck, shoulders and chest.  One cloth for both arms, hands, fingers and underarms (do underarms last).  One cloth for the abdomen followed by groin.  One cloth for right leg and foot including between the toes.  One cloth for left leg and foot including between the toes.  The last cloth is to be used for the back of the neck, back and buttocks.    Allow the CHG to air dry 3 minutes on the skin which will give it time to work and decrease the chance of irritation.  The skin may feel sticky until it is dry.  Do not rinse with water or any other liquid or you will lose the beneficial effects of the CHG.  If mild skin irritation occurs, do rinse the skin to remove the CHG.  Report this to the nurse at time of admission.  Do not apply lotions, creams, ointments, deodorants or perfumes after using the clothes. Dress in clean clothes before coming to the hospital.

## 2022-06-08 NOTE — TELEPHONE ENCOUNTER
Contacted patient through [place] interpretors.   [place] interpreted conversation  I asked that patient to eat a diet rich in Potassium preoperatively- her levels are on the low side of normal for PAT. Patient voiced understanding.

## 2022-06-09 DIAGNOSIS — E78.2 MIXED HYPERLIPIDEMIA: ICD-10-CM

## 2022-06-09 DIAGNOSIS — I10 BENIGN ESSENTIAL HTN: Primary | ICD-10-CM

## 2022-06-09 RX ORDER — CHLORTHALIDONE 25 MG/1
25 TABLET ORAL DAILY
Qty: 90 TABLET | Refills: 3 | Status: SHIPPED | OUTPATIENT
Start: 2022-06-09 | End: 2022-10-31 | Stop reason: SDUPTHER

## 2022-06-09 RX ORDER — CARVEDILOL 12.5 MG/1
12.5 TABLET ORAL 2 TIMES DAILY
Qty: 90 TABLET | Refills: 3 | Status: SHIPPED | OUTPATIENT
Start: 2022-06-09 | End: 2022-10-31 | Stop reason: SDUPTHER

## 2022-06-09 RX ORDER — LOVASTATIN 40 MG/1
40 TABLET ORAL NIGHTLY
Qty: 90 TABLET | Refills: 1 | Status: SHIPPED | OUTPATIENT
Start: 2022-06-09 | End: 2022-10-31 | Stop reason: SDUPTHER

## 2022-06-09 NOTE — TELEPHONE ENCOUNTER
Patient called and states she needed refills on the following prescriptions, which I have pended for you to approve.  She is getting surgery next week and doesn't know if she needs a visit before her surgery because she felt like she has issues swallowing. She states it feels like mucus gets stuck in the back of her throat and she has to drink water to help it go down. She is nervous with her having anesthesia for her surgery that is coming up and wanted to discuss with you. Please advise, thank you!

## 2022-06-14 ENCOUNTER — LAB (OUTPATIENT)
Dept: LAB | Facility: HOSPITAL | Age: 57
End: 2022-06-14

## 2022-06-14 DIAGNOSIS — N64.89 RADIAL SCAR OF LEFT BREAST: ICD-10-CM

## 2022-06-14 LAB — SARS-COV-2 ORF1AB RESP QL NAA+PROBE: NOT DETECTED

## 2022-06-14 PROCEDURE — C9803 HOPD COVID-19 SPEC COLLECT: HCPCS

## 2022-06-14 PROCEDURE — U0004 COV-19 TEST NON-CDC HGH THRU: HCPCS

## 2022-06-15 ENCOUNTER — HOSPITAL ENCOUNTER (OUTPATIENT)
Dept: MAMMOGRAPHY | Facility: HOSPITAL | Age: 57
Discharge: HOME | End: 2022-06-15

## 2022-06-15 ENCOUNTER — HOSPITAL ENCOUNTER (OUTPATIENT)
Facility: HOSPITAL | Age: 57
Setting detail: HOSPITAL OUTPATIENT SURGERY
Discharge: HOME | End: 2022-06-15
Attending: SURGERY | Admitting: SURGERY

## 2022-06-15 ENCOUNTER — ANESTHESIA (OUTPATIENT)
Dept: PERIOP | Facility: HOSPITAL | Age: 57
End: 2022-06-15

## 2022-06-15 ENCOUNTER — APPOINTMENT (OUTPATIENT)
Dept: GENERAL RADIOLOGY | Facility: HOSPITAL | Age: 57
End: 2022-06-15

## 2022-06-15 ENCOUNTER — ANESTHESIA EVENT (OUTPATIENT)
Dept: PERIOP | Facility: HOSPITAL | Age: 57
End: 2022-06-15

## 2022-06-15 VITALS
DIASTOLIC BLOOD PRESSURE: 78 MMHG | RESPIRATION RATE: 16 BRPM | WEIGHT: 117.7 LBS | BODY MASS INDEX: 25.39 KG/M2 | TEMPERATURE: 98.6 F | HEIGHT: 57 IN | OXYGEN SATURATION: 99 % | HEART RATE: 77 BPM | SYSTOLIC BLOOD PRESSURE: 143 MMHG

## 2022-06-15 DIAGNOSIS — N64.89 RADIAL SCAR OF LEFT BREAST: ICD-10-CM

## 2022-06-15 LAB
B-HCG UR QL: NEGATIVE
EXPIRATION DATE: NORMAL
GLUCOSE BLDC GLUCOMTR-MCNC: 142 MG/DL (ref 70–130)
INTERNAL NEGATIVE CONTROL: NEGATIVE
INTERNAL POSITIVE CONTROL: POSITIVE
Lab: NORMAL

## 2022-06-15 PROCEDURE — S0260 H&P FOR SURGERY: HCPCS | Performed by: SURGERY

## 2022-06-15 PROCEDURE — 76098 X-RAY EXAM SURGICAL SPECIMEN: CPT

## 2022-06-15 PROCEDURE — 25010000002 HYDROMORPHONE PER 4 MG: Performed by: NURSE ANESTHETIST, CERTIFIED REGISTERED

## 2022-06-15 PROCEDURE — 0 LIDOCAINE 1 % SOLUTION: Performed by: SURGERY

## 2022-06-15 PROCEDURE — 19125 EXCISION BREAST LESION: CPT | Performed by: SURGERY

## 2022-06-15 PROCEDURE — 25010000002 FENTANYL CITRATE (PF) 50 MCG/ML SOLUTION: Performed by: NURSE ANESTHETIST, CERTIFIED REGISTERED

## 2022-06-15 PROCEDURE — 25010000002 CEFAZOLIN IN DEXTROSE 2-4 GM/100ML-% SOLUTION: Performed by: SURGERY

## 2022-06-15 PROCEDURE — 25010000002 PROPOFOL 10 MG/ML EMULSION: Performed by: NURSE ANESTHETIST, CERTIFIED REGISTERED

## 2022-06-15 PROCEDURE — 25010000002 DEXAMETHASONE PER 1 MG: Performed by: NURSE ANESTHETIST, CERTIFIED REGISTERED

## 2022-06-15 PROCEDURE — C1819 TISSUE LOCALIZATION-EXCISION: HCPCS

## 2022-06-15 PROCEDURE — 88307 TISSUE EXAM BY PATHOLOGIST: CPT | Performed by: SURGERY

## 2022-06-15 PROCEDURE — 81025 URINE PREGNANCY TEST: CPT | Performed by: ANESTHESIOLOGY

## 2022-06-15 PROCEDURE — 0 LIDOCAINE 1 % SOLUTION 20 ML VIAL: Performed by: SURGERY

## 2022-06-15 PROCEDURE — 82962 GLUCOSE BLOOD TEST: CPT

## 2022-06-15 PROCEDURE — 25010000002 ONDANSETRON PER 1 MG: Performed by: NURSE ANESTHETIST, CERTIFIED REGISTERED

## 2022-06-15 RX ORDER — SODIUM CHLORIDE, SODIUM LACTATE, POTASSIUM CHLORIDE, CALCIUM CHLORIDE 600; 310; 30; 20 MG/100ML; MG/100ML; MG/100ML; MG/100ML
9 INJECTION, SOLUTION INTRAVENOUS CONTINUOUS
Status: DISCONTINUED | OUTPATIENT
Start: 2022-06-15 | End: 2022-06-16 | Stop reason: HOSPADM

## 2022-06-15 RX ORDER — NALOXONE HCL 0.4 MG/ML
0.2 VIAL (ML) INJECTION AS NEEDED
Status: DISCONTINUED | OUTPATIENT
Start: 2022-06-15 | End: 2022-06-16 | Stop reason: HOSPADM

## 2022-06-15 RX ORDER — LABETALOL HYDROCHLORIDE 5 MG/ML
5 INJECTION, SOLUTION INTRAVENOUS
Status: DISCONTINUED | OUTPATIENT
Start: 2022-06-15 | End: 2022-06-16 | Stop reason: HOSPADM

## 2022-06-15 RX ORDER — HYDROMORPHONE HYDROCHLORIDE 1 MG/ML
0.5 INJECTION, SOLUTION INTRAMUSCULAR; INTRAVENOUS; SUBCUTANEOUS
Status: DISCONTINUED | OUTPATIENT
Start: 2022-06-15 | End: 2022-06-16 | Stop reason: HOSPADM

## 2022-06-15 RX ORDER — ONDANSETRON 2 MG/ML
4 INJECTION INTRAMUSCULAR; INTRAVENOUS ONCE AS NEEDED
Status: DISCONTINUED | OUTPATIENT
Start: 2022-06-15 | End: 2022-06-16 | Stop reason: HOSPADM

## 2022-06-15 RX ORDER — DIPHENHYDRAMINE HYDROCHLORIDE 50 MG/ML
12.5 INJECTION INTRAMUSCULAR; INTRAVENOUS
Status: DISCONTINUED | OUTPATIENT
Start: 2022-06-15 | End: 2022-06-16 | Stop reason: HOSPADM

## 2022-06-15 RX ORDER — HYDRALAZINE HYDROCHLORIDE 20 MG/ML
5 INJECTION INTRAMUSCULAR; INTRAVENOUS
Status: DISCONTINUED | OUTPATIENT
Start: 2022-06-15 | End: 2022-06-16 | Stop reason: HOSPADM

## 2022-06-15 RX ORDER — PROMETHAZINE HYDROCHLORIDE 25 MG/1
25 SUPPOSITORY RECTAL ONCE AS NEEDED
Status: DISCONTINUED | OUTPATIENT
Start: 2022-06-15 | End: 2022-06-16 | Stop reason: HOSPADM

## 2022-06-15 RX ORDER — ONDANSETRON 2 MG/ML
INJECTION INTRAMUSCULAR; INTRAVENOUS AS NEEDED
Status: DISCONTINUED | OUTPATIENT
Start: 2022-06-15 | End: 2022-06-15 | Stop reason: SURG

## 2022-06-15 RX ORDER — IBUPROFEN 600 MG/1
600 TABLET ORAL ONCE AS NEEDED
Status: DISCONTINUED | OUTPATIENT
Start: 2022-06-15 | End: 2022-06-16 | Stop reason: HOSPADM

## 2022-06-15 RX ORDER — HYDROCODONE BITARTRATE AND ACETAMINOPHEN 7.5; 325 MG/1; MG/1
1 TABLET ORAL ONCE AS NEEDED
Status: COMPLETED | OUTPATIENT
Start: 2022-06-15 | End: 2022-06-15

## 2022-06-15 RX ORDER — DEXAMETHASONE SODIUM PHOSPHATE 10 MG/ML
INJECTION INTRAMUSCULAR; INTRAVENOUS AS NEEDED
Status: DISCONTINUED | OUTPATIENT
Start: 2022-06-15 | End: 2022-06-15 | Stop reason: SURG

## 2022-06-15 RX ORDER — ACETAMINOPHEN 500 MG
1000 TABLET ORAL EVERY 6 HOURS PRN
COMMUNITY

## 2022-06-15 RX ORDER — FENTANYL CITRATE 50 UG/ML
INJECTION, SOLUTION INTRAMUSCULAR; INTRAVENOUS AS NEEDED
Status: DISCONTINUED | OUTPATIENT
Start: 2022-06-15 | End: 2022-06-15 | Stop reason: SURG

## 2022-06-15 RX ORDER — DIPHENHYDRAMINE HCL 25 MG
25 CAPSULE ORAL
Status: DISCONTINUED | OUTPATIENT
Start: 2022-06-15 | End: 2022-06-16 | Stop reason: HOSPADM

## 2022-06-15 RX ORDER — MIDAZOLAM HYDROCHLORIDE 1 MG/ML
1 INJECTION INTRAMUSCULAR; INTRAVENOUS
Status: DISCONTINUED | OUTPATIENT
Start: 2022-06-15 | End: 2022-06-15 | Stop reason: HOSPADM

## 2022-06-15 RX ORDER — SODIUM CHLORIDE 0.9 % (FLUSH) 0.9 %
3 SYRINGE (ML) INJECTION EVERY 12 HOURS SCHEDULED
Status: DISCONTINUED | OUTPATIENT
Start: 2022-06-15 | End: 2022-06-15 | Stop reason: HOSPADM

## 2022-06-15 RX ORDER — CEFAZOLIN SODIUM 2 G/100ML
2 INJECTION, SOLUTION INTRAVENOUS ONCE
Status: DISCONTINUED | OUTPATIENT
Start: 2022-06-15 | End: 2022-06-15 | Stop reason: HOSPADM

## 2022-06-15 RX ORDER — FENTANYL CITRATE 50 UG/ML
50 INJECTION, SOLUTION INTRAMUSCULAR; INTRAVENOUS
Status: DISCONTINUED | OUTPATIENT
Start: 2022-06-15 | End: 2022-06-16 | Stop reason: HOSPADM

## 2022-06-15 RX ORDER — LIDOCAINE HYDROCHLORIDE 10 MG/ML
10 INJECTION, SOLUTION INFILTRATION; PERINEURAL ONCE
Status: COMPLETED | OUTPATIENT
Start: 2022-06-15 | End: 2022-06-15

## 2022-06-15 RX ORDER — EPHEDRINE SULFATE 50 MG/ML
5 INJECTION, SOLUTION INTRAVENOUS ONCE AS NEEDED
Status: DISCONTINUED | OUTPATIENT
Start: 2022-06-15 | End: 2022-06-16 | Stop reason: HOSPADM

## 2022-06-15 RX ORDER — FENTANYL CITRATE 50 UG/ML
50 INJECTION, SOLUTION INTRAMUSCULAR; INTRAVENOUS
Status: DISCONTINUED | OUTPATIENT
Start: 2022-06-15 | End: 2022-06-15 | Stop reason: HOSPADM

## 2022-06-15 RX ORDER — LIDOCAINE HYDROCHLORIDE 10 MG/ML
0.5 INJECTION, SOLUTION EPIDURAL; INFILTRATION; INTRACAUDAL; PERINEURAL ONCE AS NEEDED
Status: DISCONTINUED | OUTPATIENT
Start: 2022-06-15 | End: 2022-06-15 | Stop reason: HOSPADM

## 2022-06-15 RX ORDER — PROPOFOL 10 MG/ML
VIAL (ML) INTRAVENOUS AS NEEDED
Status: DISCONTINUED | OUTPATIENT
Start: 2022-06-15 | End: 2022-06-15 | Stop reason: SURG

## 2022-06-15 RX ORDER — FAMOTIDINE 10 MG/ML
20 INJECTION, SOLUTION INTRAVENOUS ONCE
Status: COMPLETED | OUTPATIENT
Start: 2022-06-15 | End: 2022-06-15

## 2022-06-15 RX ORDER — DIAZEPAM 5 MG/1
10 TABLET ORAL ONCE
Status: DISCONTINUED | OUTPATIENT
Start: 2022-06-15 | End: 2022-06-15 | Stop reason: HOSPADM

## 2022-06-15 RX ORDER — SODIUM CHLORIDE, SODIUM LACTATE, POTASSIUM CHLORIDE, CALCIUM CHLORIDE 600; 310; 30; 20 MG/100ML; MG/100ML; MG/100ML; MG/100ML
100 INJECTION, SOLUTION INTRAVENOUS CONTINUOUS
Status: DISCONTINUED | OUTPATIENT
Start: 2022-06-15 | End: 2022-06-16 | Stop reason: HOSPADM

## 2022-06-15 RX ORDER — PROMETHAZINE HYDROCHLORIDE 25 MG/1
25 TABLET ORAL ONCE AS NEEDED
Status: DISCONTINUED | OUTPATIENT
Start: 2022-06-15 | End: 2022-06-16 | Stop reason: HOSPADM

## 2022-06-15 RX ORDER — FLUMAZENIL 0.1 MG/ML
0.2 INJECTION INTRAVENOUS AS NEEDED
Status: DISCONTINUED | OUTPATIENT
Start: 2022-06-15 | End: 2022-06-16 | Stop reason: HOSPADM

## 2022-06-15 RX ORDER — OXYCODONE AND ACETAMINOPHEN 7.5; 325 MG/1; MG/1
1 TABLET ORAL EVERY 4 HOURS PRN
Status: DISCONTINUED | OUTPATIENT
Start: 2022-06-15 | End: 2022-06-16 | Stop reason: HOSPADM

## 2022-06-15 RX ORDER — LIDOCAINE HYDROCHLORIDE 20 MG/ML
INJECTION, SOLUTION INFILTRATION; PERINEURAL AS NEEDED
Status: DISCONTINUED | OUTPATIENT
Start: 2022-06-15 | End: 2022-06-15 | Stop reason: SURG

## 2022-06-15 RX ORDER — IBUPROFEN 200 MG
400 TABLET ORAL EVERY 8 HOURS PRN
COMMUNITY
End: 2022-06-16 | Stop reason: HOSPADM

## 2022-06-15 RX ORDER — SODIUM CHLORIDE 0.9 % (FLUSH) 0.9 %
3-10 SYRINGE (ML) INJECTION AS NEEDED
Status: DISCONTINUED | OUTPATIENT
Start: 2022-06-15 | End: 2022-06-15 | Stop reason: HOSPADM

## 2022-06-15 RX ADMIN — FENTANYL CITRATE 50 MCG: 50 INJECTION INTRAMUSCULAR; INTRAVENOUS at 09:26

## 2022-06-15 RX ADMIN — LIDOCAINE HYDROCHLORIDE 3 ML: 10 INJECTION, SOLUTION INFILTRATION; PERINEURAL at 08:05

## 2022-06-15 RX ADMIN — FENTANYL CITRATE 50 MCG: 50 INJECTION INTRAMUSCULAR; INTRAVENOUS at 09:37

## 2022-06-15 RX ADMIN — HYDROMORPHONE HYDROCHLORIDE 0.5 MG: 1 INJECTION, SOLUTION INTRAMUSCULAR; INTRAVENOUS; SUBCUTANEOUS at 11:01

## 2022-06-15 RX ADMIN — HYDROCODONE BITARTRATE AND ACETAMINOPHEN 1 TABLET: 7.5; 325 TABLET ORAL at 11:14

## 2022-06-15 RX ADMIN — HYDROMORPHONE HYDROCHLORIDE 0.5 MG: 1 INJECTION, SOLUTION INTRAMUSCULAR; INTRAVENOUS; SUBCUTANEOUS at 12:10

## 2022-06-15 RX ADMIN — FENTANYL CITRATE 50 MCG: 50 INJECTION INTRAMUSCULAR; INTRAVENOUS at 10:55

## 2022-06-15 RX ADMIN — LIDOCAINE HYDROCHLORIDE 60 MG: 20 INJECTION, SOLUTION INFILTRATION; PERINEURAL at 09:20

## 2022-06-15 RX ADMIN — FAMOTIDINE 20 MG: 10 INJECTION INTRAVENOUS at 08:39

## 2022-06-15 RX ADMIN — PROPOFOL 150 MG: 10 INJECTION, EMULSION INTRAVENOUS at 09:20

## 2022-06-15 RX ADMIN — ONDANSETRON 4 MG: 2 INJECTION INTRAMUSCULAR; INTRAVENOUS at 10:11

## 2022-06-15 RX ADMIN — SODIUM CHLORIDE, POTASSIUM CHLORIDE, SODIUM LACTATE AND CALCIUM CHLORIDE 100 ML/HR: 600; 310; 30; 20 INJECTION, SOLUTION INTRAVENOUS at 06:29

## 2022-06-15 RX ADMIN — DIAZEPAM 10 MG: 5 TABLET ORAL at 06:37

## 2022-06-15 RX ADMIN — FENTANYL CITRATE 50 MCG: 50 INJECTION INTRAMUSCULAR; INTRAVENOUS at 11:14

## 2022-06-15 RX ADMIN — DEXAMETHASONE SODIUM PHOSPHATE 10 MG: 10 INJECTION INTRAMUSCULAR; INTRAVENOUS at 09:26

## 2022-06-15 RX ADMIN — CEFAZOLIN SODIUM 2 G: 2 INJECTION, SOLUTION INTRAVENOUS at 08:59

## 2022-06-15 NOTE — ANESTHESIA PROCEDURE NOTES
Airway  Urgency: elective    Date/Time: 6/15/2022 9:23 AM  Airway not difficult    General Information and Staff    Patient location during procedure: OR  Anesthesiologist: Carline Antonio MD  CRNA/CAA: Ammy Adam CRNA    Indications and Patient Condition  Indications for airway management: airway protection    Preoxygenated: yes  Mask difficulty assessment: 1 - vent by mask    Final Airway Details  Final airway type: supraglottic airway      Successful airway: LMA  Size 4    Number of attempts at approach: 1  Assessment: lips, teeth, and gum same as pre-op and atraumatic intubation    Additional Comments  Atraumatic LMA placement, LMA to MOP, good seal and air exchange.

## 2022-06-15 NOTE — ANESTHESIA POSTPROCEDURE EVALUATION
Patient: Savita Lucio    Procedure Summary     Date: 06/15/22 Room / Location: [facility] OR  / [facility] MAIN OR    Anesthesia Start: 0913 Anesthesia Stop: 1034    Procedure: left breast needle localized excision of radial scar. (Left Breast) Diagnosis:       Radial scar of left breast      (Radial scar of left breast [N64.89])    Surgeons: Kerline Mesa MD Provider: Adonay Tran MD    Anesthesia Type: general ASA Status: 2          Anesthesia Type: general    Vitals  Vitals Value Taken Time   /75 06/15/22 1046   Temp 37 °C (98.6 °F) 06/15/22 1035   Pulse 94 06/15/22 1051   Resp 16 06/15/22 1040   SpO2 100 % 06/15/22 1051   Vitals shown include unvalidated device data.        Post Anesthesia Care and Evaluation    Patient location during evaluation: PACU  Patient participation: complete - patient participated  Level of consciousness: awake and alert  Pain management: adequate    Airway patency: patent  Anesthetic complications: No anesthetic complications    Cardiovascular status: acceptable  Respiratory status: acceptable  Hydration status: acceptable    Comments: --------------------            06/15/22               1040     --------------------   BP:       124/80     Pulse:      72       Resp:       16       Temp:                SpO2:      100%     --------------------

## 2022-06-15 NOTE — OP NOTE
Operative note    Preoperative Diagnosis: radial scar and mammographic distortion previously felt to be discordant at time of core showing PASH    Postoperative Diagnosis: same    Procedure Performed: LEFT breast needle localized excisional biopsy    Dictating physician and surgeon: Kerline Mesa MD    EBL:minimal    FINDINGS AND DESCRIPTION OF PROCEDURE:     The patient was brought to the operating room and placed on the table in the supine position. After adequate general LMA anesthesia was obtained, we sterily prepped and draped the breast and. We did a time out to identify correct patient and correct operative site.  She did receive IV antibiotic within an hour of and prior to incision.     I reviewed the mammographic localization films and planned the following incision accordingly: periareolar 6:00    I used a 15 blade to incise the skin, dissected around the localization needle and removed the specimen. I labeled this: long stitch lateral short stitch superior, double stitch deep.    I took an x-ray with the Faxitron and confirmed the lesion of interest in the specimen. Note that the tribell marker was not the target as the hematoma had displaced this- Dr Skinner circled the area of distortion to excise. The bowtie marker (PASH) was at the edge of the specimen, at the site of the previous core biopsy showing PASH  That was felt discordant.      I then irrigated, assured hemostasis and closed a deep layer of breast tissue using a running 2-0 Vicryl, followed by a superficial layer of breast using a running 2-0 Vicryl. I then closed the skin in 2 layers- the first being an interrupted 3-0 Vicryl layer, followed by a running 4-0 Monocryl.    I then applied lengthwise 1/2 inch Steri-Strips, an island dressing. Then I wrapped her in 4x4s and a 6 inch ACE wrap.    She tolerated the procedure well, there were no immediate complications, and all counts were correct at the end of the case.

## 2022-06-15 NOTE — ANESTHESIA PREPROCEDURE EVALUATION
Anesthesia Evaluation     Patient summary reviewed and Nursing notes reviewed                Airway   Mallampati: II  TM distance: >3 FB  Neck ROM: full  Dental      Pulmonary - negative pulmonary ROS   Cardiovascular     ECG reviewed  Rhythm: regular  Rate: normal    (+) hypertension, hyperlipidemia,       Neuro/Psych  (+) psychiatric history Anxiety and Depression,    GI/Hepatic/Renal/Endo    (+)  GERD,      Musculoskeletal (-) negative ROS    Abdominal    Substance History - negative use     OB/GYN negative ob/gyn ROS         Other                        Anesthesia Plan    ASA 2     general     (I have reviewed the patient's history with the patient and the chart, including all pertinent laboratory results and imaging. I have explained the risks of anesthesia including but not limited to dental damage, corneal abrasion, nerve injury, MI, stroke, and death. Questions asked and answered. Anesthetic plan discussed with patient and team as indicated. Patient expressed understanding of the above.  - all of the above was conducted through a )  intravenous induction     Anesthetic plan, risks, benefits, and alternatives have been provided, discussed and informed consent has been obtained with: patient and spouse/significant other.    Plan discussed with CRNA.        CODE STATUS:

## 2022-06-15 NOTE — H&P
There are no changes in the history or physical exam, aside from any that are listed as an addendum at the bottom of this document.   Today, patient will go for the surgery listed in the plan below.    NOte Dr Kelley called and stated that the tribell marker had migrated and so she localized the distortion rather than the tribell marker. I will lpan for excision of the distortion rather than the marker. Marker may or may not be in the specimen    Chief Complaint: Savita Lucio is a 56 y.o. female who was seen in consultation at the request of Alina Piper DO  for abnormal breast imaging and breast pain    History of Present Illness:  Patient presents with breast pain. She noted no new masses, skin changes, nipple discharge, nipple changes prior to her most recent imaging. Paino started 6 years ago when she started the depo provera and is described as a burning pain whole breast, behind nipples, worse when touched.  She stopped the depo provera in 2017 and the pain has been worse since the new year, February through April and starting May has improved somewhat.  She wears a bra most of the time.  SHe takes vitamin E, 400 units once daily and has taken this since she first started having the pain 6 years ago with starting the depo.    Her most recent imaging includes the followin/30/17 [facility]   REMA DIG NICKSAVITA BURK  Scattered fibroglandular densities. Middle third of the upper-inner quadrant of the right breast, there is an area of focal asymmetry.  Impression: Further evaluation with spot compression and possible targeted right breast sonography.  BIRADS Category 0.    17 [facility]  RT DIAG MAMMTARNG    SAVITA LUCIO    With additional imagining there is resolution of the area of foal asymmetry in the upper inner quadrant of the right breast.  BIRADS Category 1: Negative.    18           [facility] Bilateral Mammo Screening Tomosynthesis                        Naveed  Savita  Scattered fibroglandular densities. Posterior one-third lower outer quadrant of the right breast,  There has been interval development of microcalcifications.  Impression  There are no findings suspicious for malignancy in the left breast.  Birads Category 0      3/6/18  [facility]  RT DIAG SAVITA CHILD  Unilateral right digital spot magnification. There are punctuate monomorphic scattered regional microcalcification seen in the right breast. They have a stable appearance.  BIRADS Category 2: Benign findings.    She has not had a breast biopsy in the past.  She has her uterus and ovaries, is ? perimenopausal, and takes nor hormones. She stopped the depo in November and tells me that she had a little spoting in March and in May. No other.    Her family history includes the following: She has no daughters, 4 sisters, 4 maternal aunts, 4 paternal aunts.  No breast or ovarian cancer in her family.    She drinks an occasional coffee and an occasional cola.      In the interim,  Savita Lucio  has done well.  She has noted no changes in her breast exam. No new masses, skin changes, nipple changes, nipple discharge either breast.     She tells me that she has no breast pain for weeks and then may have excruciating pain for a few days.    She reports not drinking caffeine. She reports significant stress. SHe reports spotty periods some months for 2-3 days and then no periods for months.    Her most recent imaging includes the following:  Bilateral screening mammogram with 3D [facility] on 2- 14- 2019: Heterogeneously dense tissue bilaterally.  BI-RADS 2        Interval HIstory:  Mrs Lucio was last seen in our office in 3-14-19.  She is here today as a new patient/new problem.  In the interim, in February 20, 2020 she had a screening mammogram that showed an asymmetry in the middle one third upper inner quadrant left breast.  She returned March 6, 2020 and have a diagnostic mammogram and ultrasound stating that the  distortion became less apparent.  The ultrasound showed several benign cyst and recommended at follow-up mammogram.  October 6, 2020 she had a left diagnostic mammogram and an ultrasound to the 7:00, 3 cm from the nipple location 7 mm lesion that was then biopsied and showed nodular patch a bowtie marker was left and felt to be in good position but the biopsy was felt to be discordant and they recommended a surgical consultation.  She was referred back to us and I have several notes in the chart from scheduling where the office spoke with the patient's  and the patient no showed for visits multiple times.  I believe at that time we called her gynecologist office and let her know that the patient had not showed for several visits.    The patient then had a bilateral diagnostic mammogram and ultrasound for follow-up of this area and it showed a persistent area of architectural distortion with a bowtie being displaced 1.2 cm from the distortion.  Recommendation was made for surgical excision.  At that time I ordered a left repeat stereotactic biopsy to target the distortion.  This was done April 20, 2022 and returned as radial scar with a tri-Bell marker left in good position at the 9:00 location.  She reports significant bruising since the biopsy.  She denies any redness warmth or drainage.  She is here for review.      She denies any new breast masses, skin changes, nipple changes, nipple discharge either side.    Review of Systems:  Review of Systems   Constitutional: Negative for unexpected weight change (9 lb wt loss).   All other systems reviewed and are negative.       Past Medical and Surgical History:  Breast Biopsy History:  Patient has not had a breast biopsy in the past.  Breast Cancer HIstory:  Patient does not have a past medical history of breast cancer.  Breast Operations, and year:  NONE   Obstetric/Gynecologic History:  Age menstrual periods began: 15  Patient is postmenopausal, entered  "menopause naturally at age:    Number of pregnancies:1   Number of live births: 1  Number of abortions or miscarriages: 0  Age of delivery of first child: 35  Patient breast fed, for the following lenth of time: 6 MONTHS   Length of time taking birth control pills: NO   Patient has never taken hormone replacement  PATIENT STILL HAS UTERUS AND OVARIES     Past Surgical History:   Procedure Laterality Date   • BREAST BIOPSY Left     Benign   •  SECTION     • CYSTECTOMY Left     Below left buttock   • HEMORRHOIDECTOMY       Past Medical History:   Diagnosis Date   • Abdominal pain    • Acid reflux 7/15/2016   • Anemia    • Breast pain    • Contraception    • Hemorrhoid    • History of Papanicolaou smear of cervix 2015   • HLD (hyperlipidemia) 7/15/2016   • Hypertension        Prior Hospitalizations, other than for surgery or childbirth, and year:  NONE     Social History     Socioeconomic History   • Marital status:    Tobacco Use   • Smoking status: Never Smoker   • Smokeless tobacco: Never Used   Vaping Use   • Vaping Use: Never used   Substance and Sexual Activity   • Alcohol use: No     Comment: denies caffeine use    • Drug use: No   • Sexual activity: Yes     Patient is .  Patient is a homemaker.  Patient does not drink caffeine.    Family History:  Family History   Problem Relation Age of Onset   • Heart attack Mother    • Hyperlipidemia Father    • Hyperlipidemia Sister    • No Known Problems Son    • Cervical cancer Cousin        Vital Signs:  /84   Pulse 88   Ht 144.8 cm (57.01\")   Wt 54.9 kg (121 lb)   LMP  (LMP Unknown)   SpO2 98%   Breastfeeding No   BMI 26.18 kg/m²      Medications:    Current Outpatient Medications:   •  carvedilol (COREG) 12.5 MG tablet, TAKE ONE TABLET BY MOUTH TWICE A DAY WITH MEALS, Disp: 60 tablet, Rfl: 3  •  chlorthalidone (HYGROTON) 25 MG tablet, Take 1 tablet by mouth Daily., Disp: 90 tablet, Rfl: 3  •  cholecalciferol (VITAMIN D3) 25 MCG " "(1000 UT) tablet, Take 1,000 Units by mouth Daily., Disp: , Rfl:   •  COLLAGEN-VITAMIN C PO, Take  by mouth Daily., Disp: , Rfl:   •  ferrous sulfate 325 (65 FE) MG tablet, Take 325 mg by mouth Daily With Breakfast., Disp: , Rfl:   •  fluticasone (Flonase) 50 MCG/ACT nasal spray, 2 sprays into the nostril(s) as directed by provider Daily., Disp: 16 g, Rfl: 11  •  loratadine (CLARITIN) 10 MG tablet, Take 1 tablet by mouth Daily. As needed for allergies, Disp: 90 tablet, Rfl: 3  •  lovastatin (MEVACOR) 40 MG tablet, Take 1 tablet by mouth Every Night., Disp: 90 tablet, Rfl: 1  •  vitamin E 1000 UNIT capsule, Take 100 Units by mouth Daily., Disp: , Rfl:   •  Zinc 22.5 MG tablet, Take  by mouth., Disp: , Rfl:   •  lansoprazole (PREVACID) 30 MG capsule, Take 1 capsule by mouth Daily., Disp: 90 capsule, Rfl: 3     Allergies:  No Known Allergies    Physical Examination:  /84   Pulse 88   Ht 144.8 cm (57.01\")   Wt 54.9 kg (121 lb)   LMP  (LMP Unknown)   SpO2 98%   Breastfeeding No   BMI 26.18 kg/m²   General Appearance:  Patient is in no distress.  She is well kept and has an average build.   Psychiatric:  Patient with appropriate mood and affect. Alert and oriented to self, time, and place.    Breast, RIGHT:  small sized, symmetric with the contralateral side.  Breast skin is without erythema, edema, rashes.  There are no visible abnormalities upon inspection during the arm-raising maneuver or with hands on hips in the sitting position. There is no nipple retraction, discharge or nipple/areolar skin changes.There are no masses palpable in the sitting or supine positions.      Breast, LEFT:  small sized, symmetric with the contralateral side.  Breast skin is without erythema, edema, rashes.  There are no visible abnormalities upon inspection during the arm-raising maneuver or with hands on hips in the sitting position. There is no nipple retraction, discharge or nipple/areolar skin changes. Large palpable " tender hematoma inferior/LIQ LEFT breast at biopsy site. Well healing mammotomies. There are no other masses palpable in the sitting or supine positions.     Lymphatic:  There is no axillary, cervical, infraclavicular, or supraclavicular adenopathy bilaterally.  Eyes:  Pupils are round and reactive to light.  Cardiovascular:  Heart rate and rhythm are regular.  Respiratory:  Lungs are clear bilaterally with no crackles or wheezes in any lung field.  Gastrointestinal:  Abdomen is soft, nondistended, and nontender. .    Musculoskeletal:  Good strength in all 4 extremities.   There is good range of motion in both shoulders.    Skin:  No new skin lesions or rashes on the skin excluding the breast (see breast exam above).      Imagin16   [facility]  REMA SCREENING MAMMO  SAVITA OBRIEN  Scattered fibroglandular densities.  BIRADS category 1: Negative    17 [facility]   REMA DIG MAMMO  SAVITA OBRIEN  Scattered fibroglandular densities. Middle third of the upper-inner quadrant of the right breast, there is an area of focal asymmetry.  Impression: Further evaluation with spot compression and possible targeted right breast sonography.  BIRADS Category 0.    17 E  RT DIAG MAMMO    SAVITA OBRIEN  With additional imagining there is resolution of the area of foal asymmetry in the upper inner quadrant of the right breast.  BIRADS Category 1: Negative.    18           [facility] Bilateral Mammo Screening Tomosynthesis                        Savita Obrien  Scattered fibroglandular densities. Posterior one-third lower outer quadrant of the right breast,  There has been interval development of microcalcifications.  Impression  There are no findings suspicious for malignancy in the left breast.  Birads Category 0      3/6/18  E  RT DIAG MAMMO    SAVITA OBRIEN  Unilateral right digital spot magnification. There are punctuate monomorphic scattered regional microcalcification seen in the right breast. They  have a stable appearance.  BIRADS Category 2: Benign findings.    Bilateral screening mammogram with 3D [facility] on 2- 14- 2019: Heterogeneously dense tissue bilaterally.  BI-RADS 2    02/14/2019 BILATERAL MAMMO WITH KONSTANTIN       [facility]        LORI OBRIEN  Heterogeneously dense.  BI-RADS Category 2.    02/20/2020      BILATERAL SCREENING MAMMO WITH KONSTANTIN    [facility]       LORI OBRIEN  Heterogeneously dense. Middle third upper inner quadrant left breast focal asymmetry.  BI-RADS Category 0.      03/06/2020 LEFT DIAGNOSTIC MAMMO      [facility]        LORI OBRIEN  The area of architectural distortion in the left breast becomes somewhat less apparent. Ultrasound of the upper inner quadrant of the left breast shows several small benign-appearing cysts largest measuring up to approximately 9 mm in size. No solid appearing masses or other sonographic evidence of malignancy is seen. Recommend repeat left breast mammogram only in 6 months.  BR-RADS Category 3: Probably benign findings.      10/06/2020    LEFT DIAGNOSTIC MAMMO WITH KONSTANTIN & LEFT BREAST US    [facility]    LORI OBRIEN     MMG:  With additional imaging, there is persistence of an area of architectural distortion in the middle third medial aspect of left breast.    US:  At the 7:00 position on the order of 3 cm from nipple, there is an ill-defined heterogeneous hypoechoic lesion that measures 0.7 cm in greatest dimension. This corresponds to the mammographically visualized area of distortion.  BI-RADS Category 4.    03/16/2022    BILATERAL DIAGNOSTIC MAMMO WITH LIMITED LEFT BREAST US   [facility]    LORI OBRIEN  MMG:  Heterogeneously dense. There is a persistent area of architectural distortion seen in the middle third lower inner quadrant of the left breast. The bowtie-shaped metallic clip is positioned on the order of 1.2 cm medial to the epicenter of the area of distortion. Radiating spicules from the epicenter of distortion are noted.  US:  7 o’clock 2 cm from the nipple, there is a  0.9 x 0.4 x 0.4 cm ill-defined heterogenous hypoechoic region with distortion.   IMPRESSION:  No definite progression since the prior examinations from 2020 is appreciated. However, there remains suspicious regarding the etiology of the architectural distortion. Surgical excision of the area of distortion in the left breast is recommended.  BI-RADS Category 4.    Pathology:   10/19/2020 LEFT US GUIDED BREAST BIOPSY      TASIA OBRIEN   7:00, 3 cm from the nipple in the left breast. 11 g needle. 7 core specimens. A bowtie clip. Bowtie clip at the expected location. Pathology is benign but does not explain distortion and is considered discordant.    PATHOLOGY:  1. Left Breast, 7 o’clock, 3 cm from Nipple, Ultrasound-Guided Biopsies for a 0.7 cm Lesion:  A. Nodular pseudoangiomatous stromal hyperplasia (PASH).  B. Dilated ducts and fragments of cyst wall.  C. No atypical hyperplasia, in situ, nor invasive carcinoma identified.    04/20/2022 MAMMO STEREOTACTIC BREAST BIOPSY       [facility]     LORI OBRIEN  Left breast. 8 gauge Mammotome. Multiple tissue specimens. Tri bell-shaped metallic clip was placed to honey the site. Postbiopsy mammography of the left breast demonstrates placement of a metallic clip at the 9 o’clock position at the site of the pre-existing architectural distortion without evidence for clip migration. The pathology is concordant.  FINAL DIAGNOSIS:  1. Left Breast, 9:00, Stereotactic-Guided Core Needle Biopsy for an Architectural Distortion:  A. Radial scar involved by usual ductal hyperplasia and benign calcifications.    Procedures:      Assessment:   Diagnosis Plan   1. Radial scar of left breast     2. Abnormal mammogram     3. Abnormal ultrasound of breast     4. Pseudoangiomatous stromal hyperplasia of breast     5. Hematoma     6. Language barrier to communication        1-2  LEFT radial scar 9:00, tribell marker-       3-4  PASH, 10-19-20- nodular pash on ultrasound biopsy done for  abnormal mammogram- bowtie left in place but felt discordant      5-  Large hematoma LEFT LIQ from stereo 4-20-22    6-  Tanzanian speaking. Had multiple no shows to the office, uncertain as to whether this is from miscommunication due to language barrier. Has anxiety about healthcare due to language barrier.        Plan:  Mrs. Lucio, the  and I reviewed in detail her interval history, symptoms, imaging, imaging reports, pathology reports and examination today.      I believe that there are miscommunications related to her language barriers that are problematic and have asked her to please have a  with her any time she seeks healthcare or has phone calls regarding healthcare.   She missed several appointments/documented despite confirming with her  , whom she tells me speak some english. I have asked her to please have a  in addition to her  moving forward int he interest of her safety.    We reviewed her past and present imaging and pathology in 2020 and 2022 respectively.    We reviewed the nature of radial scar and the recommendation for excision for this to rule out a nearby breast cancer and also to eliminate the imaging finding.  We discussed the alternative of not excising this but this was not my recommendation.  She would like to have this excised but she would like to do this in June when her  can be available.  Again I reminded her that we would be happy to schedule in June so that her  could be there but that she should also have someone who can do proper translation for her.      We discussed the procedure of a left breast needle localized excision of radial scar.  I did not schedule it with a Marilyn due to the large hematoma that can sometimes cause the Marilyn's to malfunction.  I will call in her prescription for Lortab and Zofran.  I did remind her that this was for her surgery and to pick this up now because by June and they will put it back on  the shelf.  I also told her that this is not for the hematoma for the hematoma I would recommend ice, supportive bra and Tylenol or ibuprofen.  Her next routine mammogram will be [facility] March 7, 2023 at [facility].  A left mammogram would be due September 2022 for follow-up of the excision of the abnormal imaging finding.    See her back for surgery.  Kerline Mesa MD        We have spent 45 minutes in face to face visit today, 35 in face to face .      Next Appointment:  Return for surgery.      EMR Dragon/transcription disclaimer:    Much of this encounter note is an electronic transcription/translocation of spoken language to printed text.  The electronic translation of spoken language may permit erroneous, or at times, nonsensical words or phrases to be inadvertently transcribed.  Although I have reviewed the note from such areas, some may still exist.

## 2022-06-17 ENCOUNTER — TELEPHONE (OUTPATIENT)
Dept: SURGERY | Facility: CLINIC | Age: 57
End: 2022-06-17

## 2022-06-17 LAB
LAB AP CASE REPORT: NORMAL
LAB AP CLINICAL INFORMATION: NORMAL
PATH REPORT.FINAL DX SPEC: NORMAL
PATH REPORT.GROSS SPEC: NORMAL

## 2022-06-17 NOTE — TELEPHONE ENCOUNTER
I called patient with  on the line and let her know that surgical pathology was benign.         ----- Message from Kerline Mesa MD sent at 6/17/2022 12:44 PM EDT -----  Hi there.  Please let her know that her pathology from surgery was benign.  I am sorry but she needs an  on the line as she is Uzbek-speaking only.  Thank you Cecil

## 2022-06-17 NOTE — TELEPHONE ENCOUNTER
Karen 15, 2022 pathology from needle localized excision of radial scar and imaging distortion returned as focal residual radial scar.  We will let her know benign.    Final Diagnosis   1. Left Breast, Needle-Localized Excisional Biopsy (9 grams):               A. Focal residual radial scar.               B. Clip present with biopsy site changes and hematoma formation.               C. Background breast parenchyma with dilated cysts and cluster of apocrine cysts.    Electronically signed by Vita Mcgowan MD on 6/17/2022 at 1134

## 2022-06-22 ENCOUNTER — TELEPHONE (OUTPATIENT)
Dept: SURGERY | Facility: CLINIC | Age: 57
End: 2022-06-22

## 2022-06-22 NOTE — TELEPHONE ENCOUNTER
Patient had right excisional breast biopsy 6/15/22 for radial scar  She calls today requesting more pain medication  No redness, no heat to the touch, surgical site appears intact with no d/s  I let patient know that 3-4 days after surgery we recommend that she wean to tylenol and wear a supportive bra.

## 2022-06-23 ENCOUNTER — TELEPHONE (OUTPATIENT)
Dept: SURGERY | Facility: CLINIC | Age: 57
End: 2022-06-23

## 2022-06-23 NOTE — TELEPHONE ENCOUNTER
I let her know that Id like her to switch to ibuprofen and tylenol, ice pack if she needs.

## 2022-06-27 ENCOUNTER — OFFICE VISIT (OUTPATIENT)
Dept: SURGERY | Facility: CLINIC | Age: 57
End: 2022-06-27

## 2022-06-27 VITALS
HEART RATE: 81 BPM | DIASTOLIC BLOOD PRESSURE: 84 MMHG | BODY MASS INDEX: 25.89 KG/M2 | OXYGEN SATURATION: 100 % | SYSTOLIC BLOOD PRESSURE: 126 MMHG | WEIGHT: 120 LBS | HEIGHT: 57 IN

## 2022-06-27 DIAGNOSIS — R92.8 ABNORMAL ULTRASOUND OF BREAST: ICD-10-CM

## 2022-06-27 DIAGNOSIS — Z78.9 LANGUAGE BARRIER TO COMMUNICATION: ICD-10-CM

## 2022-06-27 DIAGNOSIS — N64.89 RADIAL SCAR OF LEFT BREAST: Primary | ICD-10-CM

## 2022-06-27 DIAGNOSIS — T14.8XXA HEMATOMA: ICD-10-CM

## 2022-06-27 DIAGNOSIS — N64.89 PSEUDOANGIOMATOUS STROMAL HYPERPLASIA OF BREAST: ICD-10-CM

## 2022-06-27 DIAGNOSIS — R92.8 ABNORMAL MAMMOGRAM: ICD-10-CM

## 2022-06-27 PROCEDURE — 99024 POSTOP FOLLOW-UP VISIT: CPT | Performed by: SURGERY

## 2022-06-27 NOTE — PROGRESS NOTES
Chief Complaint: Savita Obrien is a 57 y.o. female who was seen in consultation at the request of Alina Piper DO  for abnormal breast imaging, breast pain and a postoperative visit    History of Present Illness:  Patient presents with breast pain. She noted no new masses, skin changes, nipple discharge, nipple changes prior to her most recent imaging. Paino started 6 years ago when she started the depo provera and is described as a burning pain whole breast, behind nipples, worse when touched.  She stopped the depo provera in 2017 and the pain has been worse since the new year, February through April and starting May has improved somewhat.  She wears a bra most of the time.  SHe takes vitamin E, 400 units once daily and has taken this since she first started having the pain 6 years ago with starting the depo.    Her most recent imaging includes the followin/30/17 [facility]   REMA DIG MAMMSAVITA BURK  Scattered fibroglandular densities. Middle third of the upper-inner quadrant of the right breast, there is an area of focal asymmetry.  Impression: Further evaluation with spot compression and possible targeted right breast sonography.  BIRADS Category 0.    17 E  RT DIAG MAMMO    SAVITA OBRIEN    With additional imagining there is resolution of the area of focal asymmetry in the upper inner quadrant of the right breast.  BIRADS Category 1: Negative.    18           [facility] Bilateral Mammo Screening Tomosynthesis                        Savita Obrien  Scattered fibroglandular densities. Posterior one-third lower outer quadrant of the right breast,  There has been interval development of microcalcifications.  Impression  There are no findings suspicious for malignancy in the left breast.  Birads Category 0      3/6/18  E  RT DIAG MAMMO    SAVITA OBRIEN  Unilateral right digital spot magnification. There are punctuate monomorphic scattered regional microcalcification seen in  the right breast. They have a stable appearance.  BIRADS Category 2: Benign findings.    She has not had a breast biopsy in the past.  She has her uterus and ovaries, is ? perimenopausal, and takes nor hormones. She stopped the depo in November and tells me that she had a little spoting in March and in May. No other.    Her family history includes the following: She has no daughters, 4 sisters, 4 maternal aunts, 4 paternal aunts.  No breast or ovarian cancer in her family.    She drinks an occasional coffee and an occasional cola.      In the interim,  Savita Lucio  has done well.  She has noted no changes in her breast exam. No new masses, skin changes, nipple changes, nipple discharge either breast.     She tells me that she has no breast pain for weeks and then may have excruciating pain for a few days.    She reports not drinking caffeine. She reports significant stress. SHe reports spotty periods some months for 2-3 days and then no periods for months.    Her most recent imaging includes the following:  Bilateral screening mammogram with 3D [facility] on 2- 14- 2019: Heterogeneously dense tissue bilaterally.  BI-RADS 2        Mrs Lucio was last seen in our office in 3-14-19.  She is here today as a new patient/new problem.  In the interim, in February 20, 2020 she had a screening mammogram that showed an asymmetry in the middle one third upper inner quadrant left breast.  She returned March 6, 2020 and have a diagnostic mammogram and ultrasound stating that the distortion became less apparent.  The ultrasound showed several benign cyst and recommended at follow-up mammogram.  October 6, 2020 she had a left diagnostic mammogram and an ultrasound to the 7:00, 3 cm from the nipple location 7 mm lesion that was then biopsied and showed nodular patch a bowtie marker was left and felt to be in good position but the biopsy was felt to be discordant and they recommended a surgical consultation.  She was referred back to us  and I have several notes in the chart from scheduling where the office spoke with the patient's  and the patient no showed for visits multiple times.  I believe at that time we called her gynecologist office and let her know that the patient had not showed for several visits.    The patient then had a bilateral diagnostic mammogram and ultrasound for follow-up of this area and it showed a persistent area of architectural distortion with a bowtie being displaced 1.2 cm from the distortion.  Recommendation was made for surgical excision.  At that time I ordered a left repeat stereotactic biopsy to target the distortion.  This was done April 20, 2022 and returned as radial scar with a tri-Bell marker left in good position at the 9:00 location.  She reports significant bruising since the biopsy.  She denies any redness warmth or drainage.  She is here for review.      She denies any new breast masses, skin changes, nipple changes, nipple discharge either side.      Interval HIstory:  Karen 15, 2022 pathology from needle localized excision of radial scar and imaging distortion returned as focal residual radial scar (note that the tribell marker was displaced and not targeted, note that the imaging distortion of concern 2020 was included in the targeting/bowtie marker).    Denies any redness warmth or drainage from her incision.  Denies significant discomfort.      Review of Systems:  Review of Systems   Constitutional: Negative for unexpected weight change (1 lb wt loss).   All other systems reviewed and are negative.       Past Medical and Surgical History:  Breast Biopsy History:  Patient has not had a breast biopsy in the past.  Breast Cancer HIstory:  Patient does not have a past medical history of breast cancer.  Breast Operations, and year:  NONE   Obstetric/Gynecologic History:  Age menstrual periods began: 15  Patient is postmenopausal, entered menopause naturally at age:    Number of pregnancies:1   Number  "of live births: 1  Number of abortions or miscarriages: 0  Age of delivery of first child: 35  Patient breast fed, for the following lenth of time: 6 MONTHS   Length of time taking birth control pills: NO   Patient has never taken hormone replacement  PATIENT STILL HAS UTERUS AND OVARIES     Past Surgical History:   Procedure Laterality Date   • BREAST BIOPSY Left     Benign   • BREAST BIOPSY Left 6/15/2022    Procedure: left breast needle localized excision of radial scar.;  Surgeon: Kerline Mesa MD;  Location: [facility];  Service: General;  Laterality: Left;   •  SECTION     • COLONOSCOPY     • CYSTECTOMY Left     Below left buttock   • ENDOSCOPY     • HEMORRHOIDECTOMY       Past Medical History:   Diagnosis Date   • Acid reflux 07/15/2016   • Anemia    • Anxiety    • Breast pain, left    • Breast pain, right     STATES HAS OFF AND ON FOR 20 YEARS   • Hemorrhoid    • History of migraine    • History of Papanicolaou smear of cervix 2015   • History of tachycardia    • HLD (hyperlipidemia) 07/15/2016   • Hypertension    • Lab test positive for detection of COVID-19 virus     2020       Prior Hospitalizations, other than for surgery or childbirth, and year:  NONE     Social History     Socioeconomic History   • Marital status:    Tobacco Use   • Smoking status: Never Smoker   • Smokeless tobacco: Never Used   Vaping Use   • Vaping Use: Never used   Substance and Sexual Activity   • Alcohol use: No     Comment: denies caffeine use    • Drug use: No   • Sexual activity: Yes     Patient is .  Patient is a homemaker.  Patient does not drink caffeine.    Family History:  Family History   Problem Relation Age of Onset   • Heart attack Mother    • Hyperlipidemia Father    • Hyperlipidemia Sister    • No Known Problems Son    • Cervical cancer Cousin    • Malig Hyperthermia Neg Hx        Vital Signs:  /84   Pulse 81   Ht 144.8 cm (57.01\")   Wt 54.4 kg (120 lb)   LMP  (LMP " "Unknown)   SpO2 100%   Breastfeeding No   BMI 25.96 kg/m²      Medications:    Current Outpatient Medications:   •  acetaminophen (TYLENOL) 500 MG tablet, Take 1,000 mg by mouth Every 6 (Six) Hours As Needed for Mild Pain ., Disp: , Rfl:   •  Ascorbic Acid (VITAMIN C PO), Take 2,000 mg by mouth Daily. HOLDING FOR SURGERY, Disp: , Rfl:   •  carvedilol (COREG) 12.5 MG tablet, Take 1 tablet by mouth 2 (Two) Times a Day., Disp: 90 tablet, Rfl: 3  •  chlorthalidone (HYGROTON) 25 MG tablet, Take 1 tablet by mouth Daily., Disp: 90 tablet, Rfl: 3  •  cholecalciferol (VITAMIN D3) 25 MCG (1000 UT) tablet, Take 1,000 Units by mouth Daily. HOLDING FOR SURGERY, Disp: , Rfl:   •  COLLAGEN-VITAMIN C PO, Take 1 capsule by mouth Daily. HOLDING FOR SURGERY, Disp: , Rfl:   •  ferrous sulfate 325 (65 FE) MG tablet, Take 325 mg by mouth Daily With Breakfast., Disp: , Rfl:   •  fluticasone (Flonase) 50 MCG/ACT nasal spray, 2 sprays into the nostril(s) as directed by provider Daily. (Patient taking differently: 2 sprays into the nostril(s) as directed by provider As Needed.), Disp: 16 g, Rfl: 11  •  loratadine (CLARITIN) 10 MG tablet, Take 1 tablet by mouth Daily. As needed for allergies (Patient taking differently: Take 10 mg by mouth As Needed. As needed for allergies), Disp: 90 tablet, Rfl: 3  •  lovastatin (MEVACOR) 40 MG tablet, Take 1 tablet by mouth Every Night., Disp: 90 tablet, Rfl: 1  •  Multiple Vitamins-Minerals (ZINC PO), Take 50 mg by mouth Daily. HOLDING FOR SURGERY, Disp: , Rfl:   •  VITAMIN K PO, Take 1,000 mcg by mouth Daily. HOLDING FOR SURGERY, Disp: , Rfl:      Allergies:  No Known Allergies    Physical Examination:  /84   Pulse 81   Ht 144.8 cm (57.01\")   Wt 54.4 kg (120 lb)   LMP  (LMP Unknown)   SpO2 100%   Breastfeeding No   BMI 25.96 kg/m²   General Appearance:  Patient is in no distress.  She is well kept and has an average build.   Psychiatric:  Patient with appropriate mood and affect. Alert " and oriented to self, time, and place.    Breast, RIGHT:  small sized, symmetric with the contralateral side.  Breast skin is without erythema, edema, rashes.  There are no visible abnormalities upon inspection during the arm-raising maneuver or with hands on hips in the sitting position. There is no nipple retraction, discharge or nipple/areolar skin changes.There are no masses palpable in the sitting or supine positions.      Breast, LEFT:  small sized, symmetric with the contralateral side.  Breast skin is without erythema, edema, rashes.  There are no visible abnormalities upon inspection during the arm-raising maneuver or with hands on hips in the sitting position. There is no nipple retraction, discharge or nipple/areolar skin changes.  Well-healing periareolar incision left inferior breast from her  excision of radial scar as well as a distortion on 2020 imaging.  No erythema warmth or drainage.  There are no masses palpable in the sitting or supine positions.     Lymphatic:  There is no axillary, cervical, infraclavicular, or supraclavicular adenopathy bilaterally.  Eyes:  Pupils are round and reactive to light.  Cardiovascular:  Heart rate and rhythm are regular.  Respiratory:  Lungs are clear bilaterally with no crackles or wheezes in any lung field.  Gastrointestinal:  Abdomen is soft, nondistended, and nontender. .    Musculoskeletal:  Good strength in all 4 extremities.   There is good range of motion in both shoulders.    Skin:  No new skin lesions or rashes on the skin excluding the breast (see breast exam above).      Imagin16  [facility]  REMA SCREENING MAMMO  CAMILLE, LORI  Scattered fibroglandular densities.  BIRADS category 1: Negative    17 [facility]   REMA DIG MAMMO  CAMILLE, LORI  Scattered fibroglandular densities. Middle third of the upper-inner quadrant of the right breast, there is an area of focal asymmetry.  Impression: Further evaluation with spot compression  and possible targeted right breast sonography.  BIRADS Category 0.    2/13/17 [facility]  RT DIAG MAMMO    CAMILLEKAYERA  With additional imagining there is resolution of the area of foal asymmetry in the upper inner quadrant of the right breast.  BIRADS Category 1: Negative.    02/13/18           [facility] Bilateral Mammo Screening Tomosynthesis                        Savita Obrien  Scattered fibroglandular densities. Posterior one-third lower outer quadrant of the right breast,  There has been interval development of microcalcifications.  Impression  There are no findings suspicious for malignancy in the left breast.  Birads Category 0      3/6/18  [facility]  RT DIAG MAMMO    KAYE OBRIENRA  Unilateral right digital spot magnification. There are punctuate monomorphic scattered regional microcalcification seen in the right breast. They have a stable appearance.  BIRADS Category 2: Benign findings.    Bilateral screening mammogram with 3D [facility] on 2- 14- 2019: Heterogeneously dense tissue bilaterally.  BI-RADS 2    02/14/2019 BILATERAL MAMMO WITH KONSTANTIN       [facility]        SAVITA OBRIEN  Heterogeneously dense.  BI-RADS Category 2.    02/20/2020      BILATERAL SCREENING MAMMO WITH KONSTANTIN    [facility]       SAVITATINA OBRIEN  Heterogeneously dense. Middle third upper inner quadrant left breast focal asymmetry.  BI-RADS Category 0.      03/06/2020 LEFT DIAGNOSTIC MAMMO      [facility]        SAVITA OBRIEN  The area of architectural distortion in the left breast becomes somewhat less apparent. Ultrasound of the upper inner quadrant of the left breast shows several small benign-appearing cysts largest measuring up to approximately 9 mm in size. No solid appearing masses or other sonographic evidence of malignancy is seen. Recommend repeat left breast mammogram only in 6 months.  BR-RADS Category 3: Probably benign findings.      10/06/2020    LEFT DIAGNOSTIC MAMMO WITH KONSTANTIN & LEFT BREAST US    [facility]    SAVITA OBRIEN     MMG:  With additional imaging, there is  persistence of an area of architectural distortion in the middle third medial aspect of left breast.    US:  At the 7:00 position on the order of 3 cm from nipple, there is an ill-defined heterogeneous hypoechoic lesion that measures 0.7 cm in greatest dimension. This corresponds to the mammographically visualized area of distortion.  BI-RADS Category 4.    03/16/2022    BILATERAL DIAGNOSTIC MAMMO WITH LIMITED LEFT BREAST US   [facility]    LORI OBRIEN  MMG:  Heterogeneously dense. There is a persistent area of architectural distortion seen in the middle third lower inner quadrant of the left breast. The bowtie-shaped metallic clip is positioned on the order of 1.2 cm medial to the epicenter of the area of distortion. Radiating spicules from the epicenter of distortion are noted.  US:  7 o’clock 2 cm from the nipple, there is a 0.9 x 0.4 x 0.4 cm ill-defined heterogenous hypoechoic region with distortion.   IMPRESSION:  No definite progression since the prior examinations from 2020 is appreciated. However, there remains suspicious regarding the etiology of the architectural distortion. Surgical excision of the area of distortion in the left breast is recommended.  BI-RADS Category 4.    Pathology:   10/19/2020 LEFT US GUIDED BREAST BIOPSY      [facility]      LORI OBRIEN   7:00, 3 cm from the nipple in the left breast. 11 g needle. 7 core specimens. A bowtie clip. Bowtie clip at the expected location. Pathology is benign but does not explain distortion and is considered discordant.    PATHOLOGY:  1. Left Breast, 7 o’clock, 3 cm from Nipple, Ultrasound-Guided Biopsies for a 0.7 cm Lesion:  A. Nodular pseudoangiomatous stromal hyperplasia (PASH).  B. Dilated ducts and fragments of cyst wall.  C. No atypical hyperplasia, in situ, nor invasive carcinoma identified.    04/20/2022 MAMMO STEREOTACTIC BREAST BIOPSY       [facility]     LORI OBRIEN  Left breast. 8 gauge Mammotome. Multiple tissue specimens. Tri bell-shaped metallic clip  was placed to honey the site. Postbiopsy mammography of the left breast demonstrates placement of a metallic clip at the 9 o’clock position at the site of the pre-existing architectural distortion without evidence for clip migration. The pathology is concordant.  FINAL DIAGNOSIS:  1. Left Breast, 9:00, Stereotactic-Guided Core Needle Biopsy for an Architectural Distortion:  A. Radial scar involved by usual ductal hyperplasia and benign calcifications.    Karen 15, 2022 pathology from [facility] guided excision of radial scar returned as focal residual radial scar.  We will let her know benign.     Final Diagnosis   1. Left Breast, Needle-Localized Excisional Biopsy (9 grams):               A. Focal residual radial scar.               B. Clip present with biopsy site changes and hematoma formation.               C. Background breast parenchyma with dilated cysts and cluster of apocrine cysts.    Electronically signed by Vita Mcgowan MD on 6/17/2022 at 1138                   Procedures:      Assessment:   Diagnosis Plan   1. Radial scar of left breast  Mammo Diagnostic Digital Tomosynthesis Left With CAD   2. Abnormal mammogram  Mammo Diagnostic Digital Tomosynthesis Left With CAD   3. Abnormal ultrasound of breast     4. Pseudoangiomatous stromal hyperplasia of breast     5. Hematoma     6. Language barrier to communication        1-2  -LEFT radial scar 9:00, tribell marker- tribell marker displaced bc of hematoma and not targeted on day of excision    -PASH, 10-19-20- nodular pash on ultrasound biopsy done for abnormal mammogram- bowtie left in place but felt discordant    Karen 15, 2022 pathology from needle localized excision of radial scar and imaging distortion returned as focal residual radial scar (note that the tribell marker was displaced and not targeted, note that the imaging distortion of concern 2020 was included in the targeting/bowtie marker).    5-  Large hematoma LEFT LIQ from stereo  4-20-22    6-  Green[place]ic speaking. Had multiple no shows to the office, uncertain as to whether this is from miscommunication due to language barrier. Has anxiety about healthcare due to language barrier.        Plan:  Mrs. Lucio, the  and I reviewed her pathology and examination together today.  We are pleased with her pathology.  She is healing nicely.  I will arrange for a left mammogram in October 2022 to follow-up to make sure that all imaging abnormalities of concern have been removed.  It is possible that the tri-Bell marker will be on her mammogram as this was displaced from the core biopsy site and not targeted for excision.  Her next routine mammogram will be due March 7, 2023 at [facility].      I asked her to call us in the interim with concerns otherwise we will see her back in the fall after imaging.      Of note,   I believe that there are miscommunications related to her language barriers that are problematic and have asked her to please have a  with her any time she seeks healthcare or has phone calls regarding healthcare.   She missed several appointments/documented despite confirming with her  , whom she tells me speak some english. I have asked her to please have a  in addition to her  moving forward int he interest of her safety.    Specifically, she was unaware in 2020 that she needed the 2020 imaging finding excised.    See her back for surgery.  Kerline Mesa MD        Next Appointment:  Return for Next scheduled follow up, after imaging.      EMR Dragon/transcription disclaimer:    Much of this encounter note is an electronic transcription/translocation of spoken language to printed text.  The electronic translation of spoken language may permit erroneous, or at times, nonsensical words or phrases to be inadvertently transcribed.  Although I have reviewed the note from such areas, some may still exist.

## 2022-07-12 ENCOUNTER — TELEPHONE (OUTPATIENT)
Dept: SURGERY | Facility: CLINIC | Age: 57
End: 2022-07-12

## 2022-07-12 NOTE — TELEPHONE ENCOUNTER
Left Dx MMG [facility] 10/10/2022 arrival 8:45 am.  F/u appt with Dr. Mesa 10/21/2022 at 9:30 am.  Unable to speak with pt. Left detailed message with the help of  Charanjit.  Pt to call back and confirm.    MEB

## 2022-10-11 ENCOUNTER — TELEPHONE (OUTPATIENT)
Dept: SURGERY | Facility: CLINIC | Age: 57
End: 2022-10-11

## 2022-10-11 NOTE — TELEPHONE ENCOUNTER
Pt no showed for MMG.    I reached out to pt with [place]  line using  Kayley (ID [id number]). With Kayley's help pt let me know she was unable to make it to that appt. Pt stated she had no preference on day and time but preferred Mondays when possible.    I got pt MMG r/s at [facility] Wed 10/26/2022 at 10 am, arrival 9:45 am.   This was the first available.    Pt f/u with Dr. Mesa r/s for Fri 11/4/2022 at 11:30 am, arrival 11:15 am.    With the help of  Molina (ID#: [id number]) I relayed both appt dates and times to the pt who then repeated them back to me to verify she had them correct.    MEB

## 2022-10-26 ENCOUNTER — HOSPITAL ENCOUNTER (OUTPATIENT)
Dept: MAMMOGRAPHY | Facility: HOSPITAL | Age: 57
Discharge: HOME | End: 2022-10-26
Admitting: SURGERY

## 2022-10-26 ENCOUNTER — TELEPHONE (OUTPATIENT)
Dept: SURGERY | Facility: CLINIC | Age: 57
End: 2022-10-26

## 2022-10-26 PROCEDURE — 77065 DX MAMMO INCL CAD UNI: CPT

## 2022-10-26 PROCEDURE — G0279 TOMOSYNTHESIS, MAMMO: HCPCS

## 2022-10-26 NOTE — TELEPHONE ENCOUNTER
October 26, 2022: Left diagnostic mammogram tomosynthesis [facility].  The breast is heterogenously dense.  BI-RADS 2

## 2022-10-31 ENCOUNTER — TELEPHONE (OUTPATIENT)
Dept: SPORTS MEDICINE | Facility: CLINIC | Age: 57
End: 2022-10-31

## 2022-10-31 DIAGNOSIS — I10 BENIGN ESSENTIAL HTN: ICD-10-CM

## 2022-10-31 DIAGNOSIS — E78.2 MIXED HYPERLIPIDEMIA: ICD-10-CM

## 2022-10-31 DIAGNOSIS — J30.1 SEASONAL ALLERGIC RHINITIS DUE TO POLLEN: ICD-10-CM

## 2022-10-31 RX ORDER — LOVASTATIN 40 MG/1
40 TABLET ORAL NIGHTLY
Qty: 90 TABLET | Refills: 3 | Status: SHIPPED | OUTPATIENT
Start: 2022-10-31 | End: 2022-11-07 | Stop reason: SDUPTHER

## 2022-10-31 RX ORDER — NITROFURANTOIN 25; 75 MG/1; MG/1
100 CAPSULE ORAL 2 TIMES DAILY
Qty: 14 CAPSULE | Refills: 0 | Status: SHIPPED | OUTPATIENT
Start: 2022-10-31 | End: 2022-11-07 | Stop reason: SDUPTHER

## 2022-10-31 RX ORDER — CHLORTHALIDONE 25 MG/1
25 TABLET ORAL DAILY
Qty: 90 TABLET | Refills: 3 | Status: SHIPPED | OUTPATIENT
Start: 2022-10-31 | End: 2022-11-07 | Stop reason: SDUPTHER

## 2022-10-31 RX ORDER — CARVEDILOL 12.5 MG/1
12.5 TABLET ORAL 2 TIMES DAILY
Qty: 90 TABLET | Refills: 3 | Status: SHIPPED | OUTPATIENT
Start: 2022-10-31 | End: 2022-11-07 | Stop reason: SDUPTHER

## 2022-10-31 NOTE — TELEPHONE ENCOUNTER
Patient called requesting a refill on her lovastatin, chrorthalidone and carvedilol.     [facility] [address]    She also stated she is having pain when urinating, no burning.    Please advise,

## 2022-11-03 ENCOUNTER — TELEPHONE (OUTPATIENT)
Dept: SURGERY | Facility: CLINIC | Age: 57
End: 2022-11-03

## 2022-11-03 NOTE — TELEPHONE ENCOUNTER
I made a call to the pt with the help of [place]  Deandra (ID# [id number]).    During this call I let the pt know that tomorrow's (11/4/22) appt had been cancelled and     r/s to Mon 11/28/2022 at 10:30 am, arrival 10:15 am as we do not have access to an in person  for tomorrow's appt.    Pt expressed vu and was in agreement with this plan.    I have contacted [facility] requesting an in person  for the 11/28 appt.    MEB

## 2022-11-07 RX ORDER — CHLORTHALIDONE 25 MG/1
25 TABLET ORAL DAILY
Qty: 90 TABLET | Refills: 3 | Status: SHIPPED | OUTPATIENT
Start: 2022-11-07

## 2022-11-07 RX ORDER — LOVASTATIN 40 MG/1
40 TABLET ORAL NIGHTLY
Qty: 90 TABLET | Refills: 3 | Status: SHIPPED | OUTPATIENT
Start: 2022-11-07

## 2022-11-07 RX ORDER — CARVEDILOL 12.5 MG/1
12.5 TABLET ORAL 2 TIMES DAILY
Qty: 90 TABLET | Refills: 3 | Status: SHIPPED | OUTPATIENT
Start: 2022-11-07

## 2022-11-07 RX ORDER — NITROFURANTOIN 25; 75 MG/1; MG/1
100 CAPSULE ORAL 2 TIMES DAILY
Qty: 14 CAPSULE | Refills: 0 | Status: SHIPPED | OUTPATIENT
Start: 2022-11-07

## 2022-11-14 ENCOUNTER — OFFICE VISIT (OUTPATIENT)
Dept: SPORTS MEDICINE | Facility: CLINIC | Age: 57
End: 2022-11-14

## 2022-11-14 ENCOUNTER — LAB (OUTPATIENT)
Dept: LAB | Facility: HOSPITAL | Age: 57
End: 2022-11-14

## 2022-11-14 VITALS
HEART RATE: 105 BPM | TEMPERATURE: 98.2 F | DIASTOLIC BLOOD PRESSURE: 70 MMHG | SYSTOLIC BLOOD PRESSURE: 130 MMHG | BODY MASS INDEX: 25.89 KG/M2 | OXYGEN SATURATION: 98 % | HEIGHT: 57 IN | WEIGHT: 120 LBS

## 2022-11-14 DIAGNOSIS — R39.89 BLADDER PAIN: Primary | ICD-10-CM

## 2022-11-14 DIAGNOSIS — M70.61 TROCHANTERIC BURSITIS OF RIGHT HIP: ICD-10-CM

## 2022-11-14 LAB
BACTERIA UR QL AUTO: ABNORMAL /HPF
BILIRUB BLD-MCNC: NEGATIVE MG/DL
BILIRUB UR QL STRIP: NEGATIVE
CLARITY UR: CLEAR
CLARITY, POC: CLEAR
COLOR UR: YELLOW
COLOR UR: YELLOW
EXPIRATION DATE: ABNORMAL
GLUCOSE UR STRIP-MCNC: NEGATIVE MG/DL
GLUCOSE UR STRIP-MCNC: NEGATIVE MG/DL
HGB UR QL STRIP.AUTO: ABNORMAL
HYALINE CASTS UR QL AUTO: ABNORMAL /LPF
KETONES UR QL STRIP: NEGATIVE
KETONES UR QL: NEGATIVE
LEUKOCYTE EST, POC: NEGATIVE
LEUKOCYTE ESTERASE UR QL STRIP.AUTO: NEGATIVE
Lab: ABNORMAL
NITRITE UR QL STRIP: NEGATIVE
NITRITE UR-MCNC: NEGATIVE MG/ML
PH UR STRIP.AUTO: 7.5 [PH] (ref 5–8)
PH UR: 7.5 [PH] (ref 5–8)
PROT UR QL STRIP: NEGATIVE
PROT UR STRIP-MCNC: NEGATIVE MG/DL
RBC # UR STRIP: ABNORMAL /HPF
RBC # UR STRIP: ABNORMAL /UL
REF LAB TEST METHOD: ABNORMAL
SP GR UR STRIP: 1.01 (ref 1–1.03)
SP GR UR: 1.02 (ref 1–1.03)
SQUAMOUS #/AREA URNS HPF: ABNORMAL /HPF
UROBILINOGEN UR QL STRIP: ABNORMAL
UROBILINOGEN UR QL: NORMAL
WBC # UR STRIP: ABNORMAL /HPF

## 2022-11-14 PROCEDURE — 99214 OFFICE O/P EST MOD 30 MIN: CPT | Performed by: FAMILY MEDICINE

## 2022-11-14 PROCEDURE — 81001 URINALYSIS AUTO W/SCOPE: CPT | Performed by: FAMILY MEDICINE

## 2022-11-14 PROCEDURE — 81003 URINALYSIS AUTO W/O SCOPE: CPT | Performed by: FAMILY MEDICINE

## 2022-11-14 PROCEDURE — 87086 URINE CULTURE/COLONY COUNT: CPT | Performed by: FAMILY MEDICINE

## 2022-11-14 NOTE — PROGRESS NOTES
"Savita is a 57 y.o. year old female    Chief Complaint   Patient presents with   • Urinary Tract Infection     States that she has had urgency, pain and frequency for over a month. She states that she has noticed it very yellow. She has not taken any medication for it.     Visit performed with assistance of     History of Present Illness   HPI   1.  Complains of bladder pain for about the past month.  Waxing and waning in severity, not obviously associated with painful urination.  No bleeding or bowel normalities.  No dyspareunia.    2.  Right lateral hip pain radiates down the leg worse with sleeping positions    Review of Systems    /70 (BP Location: Right arm, Patient Position: Sitting, Cuff Size: Adult)   Pulse 105   Temp 98.2 °F (36.8 °C) (Temporal)   Ht 144.8 cm (57.01\")   Wt 54.4 kg (120 lb)   SpO2 98%   BMI 25.96 kg/m²          Physical Exam  Abdominal:      General: Abdomen is flat. There is no distension.      Palpations: Abdomen is soft.      Tenderness: There is no abdominal tenderness. There is no guarding.   Musculoskeletal:      Comments: Right hip tenderness palpation on the greater trochanter, positive Jovanni   Psychiatric:         Mood and Affect: Mood normal.           Current Outpatient Medications:   •  acetaminophen (TYLENOL) 500 MG tablet, Take 1,000 mg by mouth Every 6 (Six) Hours As Needed for Mild Pain ., Disp: , Rfl:   •  Ascorbic Acid (VITAMIN C PO), Take 2,000 mg by mouth Daily. HOLDING FOR SURGERY, Disp: , Rfl:   •  carvedilol (COREG) 12.5 MG tablet, Take 1 tablet by mouth 2 (Two) Times a Day., Disp: 90 tablet, Rfl: 3  •  chlorthalidone (HYGROTON) 25 MG tablet, Take 1 tablet by mouth Daily., Disp: 90 tablet, Rfl: 3  •  cholecalciferol (VITAMIN D3) 25 MCG (1000 UT) tablet, Take 1,000 Units by mouth Daily. HOLDING FOR SURGERY, Disp: , Rfl:   •  COLLAGEN-VITAMIN C PO, Take 1 capsule by mouth Daily. HOLDING FOR SURGERY, Disp: , Rfl:   •  ferrous sulfate 325 (65 FE) MG " tablet, Take 325 mg by mouth Daily With Breakfast., Disp: , Rfl:   •  fluticasone (Flonase) 50 MCG/ACT nasal spray, 2 sprays into the nostril(s) as directed by provider Daily. (Patient taking differently: 2 sprays into the nostril(s) as directed by provider As Needed.), Disp: 16 g, Rfl: 11  •  loratadine (CLARITIN) 10 MG tablet, Take 1 tablet by mouth Daily. As needed for allergies (Patient taking differently: Take 1 tablet by mouth As Needed. As needed for allergies), Disp: 90 tablet, Rfl: 3  •  lovastatin (MEVACOR) 40 MG tablet, Take 1 tablet by mouth Every Night., Disp: 90 tablet, Rfl: 3  •  Multiple Vitamins-Minerals (ZINC PO), Take 50 mg by mouth Daily. HOLDING FOR SURGERY, Disp: , Rfl:   •  nitrofurantoin, macrocrystal-monohydrate, (Macrobid) 100 MG capsule, Take 1 capsule by mouth 2 (Two) Times a Day. For urinary tract infection, Disp: 14 capsule, Rfl: 0  •  VITAMIN K PO, Take 1,000 mcg by mouth Daily. HOLDING FOR SURGERY, Disp: , Rfl:      Diagnoses and all orders for this visit:    Bladder pain  -     POCT urinalysis dipstick, automated  -     Urinalysis With Microscopic - Urine, Clean Catch  -     Urine Culture - Urine, Urine, Clean Catch    Trochanteric bursitis of right hip    Urinalysis unremarkable in office.  We will send out for full microscopic and culture.  If unrevealing then recommend follow-up with gynecology    Discussed nature of trochanteric bursitis, home exercise packet given, use ice and over-the-counter ibuprofen        EMR Dragon/Transcription disclaimer:    Much of this encounter note is an electronic transcription/translation of spoken language to printed text.  The electronic translation of spoken language may permit erroneous, or at times, nonsensical words or phrases to be inadvertently transcribed.  Although I have reviewed the note for such errors some may still exist.

## 2022-11-15 LAB — BACTERIA SPEC AEROBE CULT: NO GROWTH

## 2022-11-18 ENCOUNTER — TELEPHONE (OUTPATIENT)
Dept: SPORTS MEDICINE | Facility: CLINIC | Age: 57
End: 2022-11-18

## 2022-11-18 ENCOUNTER — TELEPHONE (OUTPATIENT)
Dept: OBSTETRICS AND GYNECOLOGY | Facility: CLINIC | Age: 57
End: 2022-11-18

## 2022-11-18 NOTE — TELEPHONE ENCOUNTER
I called the patient and relayed the message below per . she states that she is waiting to hear back from her gynecologist to be able to get an appointment due to her still having discomfort. I advised her if she cannot get in sooner with her gynecologist to give us a call back and let us know. She states that she will do. No further action needed at this time. Thank you!    -CHARLES Perkins

## 2022-11-18 NOTE — TELEPHONE ENCOUNTER
----- Message from Diego Crum MD sent at 11/17/2022  9:05 AM EST -----  Additional urine testing is normal.  There is no evidence of any infection.  Recommend follow-up with gynecologist

## 2022-11-22 ENCOUNTER — OFFICE VISIT (OUTPATIENT)
Dept: OBSTETRICS AND GYNECOLOGY | Facility: CLINIC | Age: 57
End: 2022-11-22

## 2022-11-22 VITALS
SYSTOLIC BLOOD PRESSURE: 152 MMHG | WEIGHT: 121 LBS | BODY MASS INDEX: 26.1 KG/M2 | HEIGHT: 57 IN | DIASTOLIC BLOOD PRESSURE: 92 MMHG

## 2022-11-22 DIAGNOSIS — R10.2 PELVIC PAIN: Primary | ICD-10-CM

## 2022-11-22 LAB
B-HCG UR QL: NEGATIVE
BILIRUB BLD-MCNC: NEGATIVE MG/DL
CLARITY, POC: CLEAR
COLOR UR: YELLOW
EXPIRATION DATE: NORMAL
GLUCOSE UR STRIP-MCNC: NEGATIVE MG/DL
INTERNAL NEGATIVE CONTROL: NEGATIVE
INTERNAL POSITIVE CONTROL: POSITIVE
KETONES UR QL: NEGATIVE
LEUKOCYTE EST, POC: NEGATIVE
Lab: NORMAL
NITRITE UR-MCNC: NEGATIVE MG/ML
PH UR: 5 [PH] (ref 5–8)
PROT UR STRIP-MCNC: NEGATIVE MG/DL
RBC # UR STRIP: NEGATIVE /UL
SP GR UR: 1.02 (ref 1–1.03)
UROBILINOGEN UR QL: NORMAL

## 2022-11-22 PROCEDURE — 99213 OFFICE O/P EST LOW 20 MIN: CPT | Performed by: OBSTETRICS & GYNECOLOGY

## 2022-11-22 PROCEDURE — 81025 URINE PREGNANCY TEST: CPT | Performed by: OBSTETRICS & GYNECOLOGY

## 2022-11-22 PROCEDURE — 81002 URINALYSIS NONAUTO W/O SCOPE: CPT | Performed by: OBSTETRICS & GYNECOLOGY

## 2022-11-22 NOTE — PROGRESS NOTES
"PROBLEM VISIT    Chief Complaint: pelvic pain      Savita Lucio is a 57 y.o. patient who presents for follow up of pelvic pain. Pt reports she was seen by her primary care physician due to the pain. She had a urine culture performed and it was negative. She presents today for an US. She reports that she noticed the pain for approx a month. The pain is intermittent. She reports that the pain is exacerbated by tight clothing. She is reporting that she notices the pain with urinating as well. She reports she has been drinking some tea and her symptoms are improving. No BM complaints. Last colonoscopy 2018 which was normal.     Chief Complaint   Patient presents with   • Pelvic Pain             The following portions of the patient's history were reviewed and updated as appropriate: allergies, current medications and problem list.    Review of Systems   Constitutional: Negative for appetite change, chills, fatigue, fever and unexpected weight change.   Gastrointestinal: Negative for abdominal distention, abdominal pain, anal bleeding, blood in stool, constipation, diarrhea, nausea and vomiting.   Genitourinary: Negative for dyspareunia, dysuria, menstrual problem, pelvic pain, vaginal bleeding, vaginal discharge and vaginal pain.       /92   Ht 144.8 cm (57.01\")   Wt 54.9 kg (121 lb)   LMP 01/01/2022   BMI 26.18 kg/m²     Physical Exam  Constitutional:       General: She is not in acute distress.     Appearance: Normal appearance. She is not ill-appearing, toxic-appearing or diaphoretic.   Neurological:      General: No focal deficit present.      Mental Status: She is alert and oriented to person, place, and time. Mental status is at baseline.      Motor: No weakness.      Gait: Gait normal.   Psychiatric:         Mood and Affect: Mood normal.         Behavior: Behavior normal.         Thought Content: Thought content normal.         Judgment: Judgment normal.           Assessment & Plan   Diagnoses and all " orders for this visit:    1. Pelvic pain (Primary)  -     POC Urinalysis Dipstick  -     POC Pregnancy, Urine      58yo with pelvic pain    1) pelvic pain: Improving. TVUS today: AV uterus. EM lining 4mm. Fluid in cervical canal measuring 1.1 x .4 x .5mm. Sonolucent area within right ovary measuring 1.5 x 1.4 x 1.1cm.  Normal left ovary. No free fluid. Results reviewed with pt. Pt to call if symptoms worsen and would then need CT scan.     2) gyn HM: LPS 2/2022    3) left breast biopsy 6/2022: Negative. Per Dr Mesa note, pt was supposed to have a left breast MMG 10/2022 and then regular screening 3/2023. Pt has not had this imaging. Will help to arrange.             No follow-ups on file.      Alina Piper DO    11/22/2022  09:25 EST

## 2022-11-28 ENCOUNTER — OFFICE VISIT (OUTPATIENT)
Dept: SURGERY | Facility: CLINIC | Age: 57
End: 2022-11-28

## 2022-11-28 VITALS
HEART RATE: 87 BPM | WEIGHT: 123 LBS | DIASTOLIC BLOOD PRESSURE: 80 MMHG | OXYGEN SATURATION: 98 % | SYSTOLIC BLOOD PRESSURE: 136 MMHG | BODY MASS INDEX: 26.54 KG/M2 | HEIGHT: 57 IN

## 2022-11-28 DIAGNOSIS — N64.89 PSEUDOANGIOMATOUS STROMAL HYPERPLASIA OF BREAST: ICD-10-CM

## 2022-11-28 DIAGNOSIS — Z78.9 LANGUAGE BARRIER TO COMMUNICATION: ICD-10-CM

## 2022-11-28 DIAGNOSIS — T14.8XXA HEMATOMA: ICD-10-CM

## 2022-11-28 DIAGNOSIS — N64.4 MASTODYNIA: ICD-10-CM

## 2022-11-28 DIAGNOSIS — R92.8 ABNORMAL ULTRASOUND OF BREAST: ICD-10-CM

## 2022-11-28 DIAGNOSIS — N64.89 RADIAL SCAR OF LEFT BREAST: Primary | ICD-10-CM

## 2022-11-28 DIAGNOSIS — R92.8 ABNORMAL MAMMOGRAM: ICD-10-CM

## 2022-11-28 PROCEDURE — 99213 OFFICE O/P EST LOW 20 MIN: CPT | Performed by: SURGERY

## 2022-11-28 NOTE — PROGRESS NOTES
Chief Complaint: Savita Obrien is a 57 y.o. female who was seen in consultation at the request of Alina Piper DO  for abnormal breast imaging, breast pain and a followup visit    History of Present Illness:  Patient presents with breast pain. She noted no new masses, skin changes, nipple discharge, nipple changes prior to her most recent imaging. Paino started 6 years ago when she started the depo provera and is described as a burning pain whole breast, behind nipples, worse when touched.  She stopped the depo provera in 2017 and the pain has been worse since the new year, February through April and starting May has improved somewhat.  She wears a bra most of the time.  SHe takes vitamin E, 400 units once daily and has taken this since she first started having the pain 6 years ago with starting the depo.    Her most recent imaging includes the followin/30/17 [facility]   REMA DIG MAMMO  SAVITA OBRIEN  Scattered fibroglandular densities. Middle third of the upper-inner quadrant of the right breast, there is an area of focal asymmetry.  Impression: Further evaluation with spot compression and possible targeted right breast sonography.  BIRADS Category 0.    17 E  RT DIAG MAMMO    SAVITA OBRIEN    With additional imagining there is resolution of the area of focal asymmetry in the upper inner quadrant of the right breast.  BIRADS Category 1: Negative.    18           [facility] Bilateral Mammo Screening Tomosynthesis                        Savita Obrien  Scattered fibroglandular densities. Posterior one-third lower outer quadrant of the right breast,  There has been interval development of microcalcifications.  Impression  There are no findings suspicious for malignancy in the left breast.  Birads Category 0      3/6/18  E  RT DIAG MAMMO    SAVITA OBRIEN  Unilateral right digital spot magnification. There are punctuate monomorphic scattered regional microcalcification seen in the  right breast. They have a stable appearance.  BIRADS Category 2: Benign findings.    She has not had a breast biopsy in the past.  She has her uterus and ovaries, is ? perimenopausal, and takes nor hormones. She stopped the depo in November and tells me that she had a little spoting in March and in May. No other.    Her family history includes the following: She has no daughters, 4 sisters, 4 maternal aunts, 4 paternal aunts.  No breast or ovarian cancer in her family.    She drinks an occasional coffee and an occasional cola.      In the interim,  Savita Lucio  has done well.  She has noted no changes in her breast exam. No new masses, skin changes, nipple changes, nipple discharge either breast.     She tells me that she has no breast pain for weeks and then may have excruciating pain for a few days.    She reports not drinking caffeine. She reports significant stress. SHe reports spotty periods some months for 2-3 days and then no periods for months.    Her most recent imaging includes the following:  Bilateral screening mammogram with 3D [facility] on 2- 14- 2019: Heterogeneously dense tissue bilaterally.  BI-RADS 2        Mrs Lucio was last seen in our office in 3-14-19.  She is here today as a new patient/new problem.  In the interim, in February 20, 2020 she had a screening mammogram that showed an asymmetry in the middle one third upper inner quadrant left breast.  She returned March 6, 2020 and have a diagnostic mammogram and ultrasound stating that the distortion became less apparent.  The ultrasound showed several benign cyst and recommended at follow-up mammogram.  October 6, 2020 she had a left diagnostic mammogram and an ultrasound to the 7:00, 3 cm from the nipple location 7 mm lesion that was then biopsied and showed nodular patch a bowtie marker was left and felt to be in good position but the biopsy was felt to be discordant and they recommended a surgical consultation.  She was referred back to us and  I have several notes in the chart from scheduling where the office spoke with the patient's  and the patient no showed for visits multiple times.  I believe at that time we called her gynecologist office and let her know that the patient had not showed for several visits.    The patient then had a bilateral diagnostic mammogram and ultrasound for follow-up of this area and it showed a persistent area of architectural distortion with a bowtie being displaced 1.2 cm from the distortion.  Recommendation was made for surgical excision.  At that time I ordered a left repeat stereotactic biopsy to target the distortion.  This was done April 20, 2022 and returned as radial scar with a tri-Bell marker left in good position at the 9:00 location.  She reports significant bruising since the biopsy.  She denies any redness warmth or drainage.  She is here for review.      She denies any new breast masses, skin changes, nipple changes, nipple discharge either side.      Karen 15, 2022 pathology from needle localized excision of radial scar and imaging distortion returned as focal residual radial scar (note that the tribell marker was displaced and not targeted, note that the imaging distortion of concern 2020 was included in the targeting/bowtie marker).    Denies any redness warmth or drainage from her incision.  Denies significant discomfort.      Interval HIstory:    In the interim,  Savita Lucio  has done well.  She has noted no changes in her breast exam. No new masses, skin changes, nipple changes, nipple discharge either breast.   She does report breast pain, bilateral, diffuse, RIGHT greater than LEFT,  but better than 1-2 months ago.    Her most recent imaging includes the following:  October 26, 2022: Left diagnostic mammogram tomosynthesis [facility].  The breast is heterogenously dense.  BI-RADS 2    She is here to review.      Review of Systems:  Review of Systems   Constitutional: Positive for  unexpected weight change (1 lb wt gain).   All other systems reviewed and are negative.       Past Medical and Surgical History:  Breast Biopsy History:  Patient has not had a breast biopsy in the past.  Breast Cancer HIstory:  Patient does not have a past medical history of breast cancer.  Breast Operations, and year:  NONE   Obstetric/Gynecologic History:  Age menstrual periods began: 15  Patient is postmenopausal, entered menopause naturally at age:    Number of pregnancies:1   Number of live births: 1  Number of abortions or miscarriages: 0  Age of delivery of first child: 35  Patient breast fed, for the following lenth of time: 6 MONTHS   Length of time taking birth control pills: NO   Patient has never taken hormone replacement  PATIENT STILL HAS UTERUS AND OVARIES     Past Surgical History:   Procedure Laterality Date   • BREAST BIOPSY Left     Benign   • BREAST BIOPSY Left 6/15/2022    Procedure: left breast needle localized excision of radial scar.;  Surgeon: Kerline Mesa MD;  Location: [facility];  Service: General;  Laterality: Left;   •  SECTION     • COLONOSCOPY     • CYSTECTOMY Left     Below left buttock   • ENDOSCOPY     • HEMORRHOIDECTOMY       Past Medical History:   Diagnosis Date   • Acid reflux 07/15/2016   • Anemia    • Anxiety    • Breast pain, left    • Breast pain, right     STATES HAS OFF AND ON FOR 20 YEARS   • Hemorrhoid    • History of migraine    • History of Papanicolaou smear of cervix 2015   • History of tachycardia    • HLD (hyperlipidemia) 07/15/2016   • Hypertension    • Lab test positive for detection of COVID-19 virus     2020       Prior Hospitalizations, other than for surgery or childbirth, and year:  NONE     Social History     Socioeconomic History   • Marital status:    Tobacco Use   • Smoking status: Never   • Smokeless tobacco: Never   Vaping Use   • Vaping Use: Never used   Substance and Sexual Activity   • Alcohol use: No     Comment:  "denies caffeine use    • Drug use: No   • Sexual activity: Yes     Patient is .  Patient is a homemaker.  Patient does not drink caffeine.    Family History:  Family History   Problem Relation Age of Onset   • Heart attack Mother    • Hyperlipidemia Father    • Hyperlipidemia Sister    • No Known Problems Son    • Cervical cancer Cousin    • Malig Hyperthermia Neg Hx        Vital Signs:  /80   Pulse 87   Ht 144.8 cm (57.01\")   Wt 55.8 kg (123 lb)   SpO2 98%   BMI 26.61 kg/m²      Medications:    Current Outpatient Medications:   •  acetaminophen (TYLENOL) 500 MG tablet, Take 1,000 mg by mouth Every 6 (Six) Hours As Needed for Mild Pain ., Disp: , Rfl:   •  Ascorbic Acid (VITAMIN C PO), Take 2,000 mg by mouth Daily. HOLDING FOR SURGERY, Disp: , Rfl:   •  carvedilol (COREG) 12.5 MG tablet, Take 1 tablet by mouth 2 (Two) Times a Day., Disp: 90 tablet, Rfl: 3  •  chlorthalidone (HYGROTON) 25 MG tablet, Take 1 tablet by mouth Daily., Disp: 90 tablet, Rfl: 3  •  cholecalciferol (VITAMIN D3) 25 MCG (1000 UT) tablet, Take 1,000 Units by mouth Daily. HOLDING FOR SURGERY, Disp: , Rfl:   •  COLLAGEN-VITAMIN C PO, Take 1 capsule by mouth Daily. HOLDING FOR SURGERY, Disp: , Rfl:   •  ferrous sulfate 325 (65 FE) MG tablet, Take 325 mg by mouth Daily With Breakfast., Disp: , Rfl:   •  fluticasone (Flonase) 50 MCG/ACT nasal spray, 2 sprays into the nostril(s) as directed by provider Daily. (Patient taking differently: 2 sprays into the nostril(s) as directed by provider As Needed.), Disp: 16 g, Rfl: 11  •  loratadine (CLARITIN) 10 MG tablet, Take 1 tablet by mouth Daily. As needed for allergies (Patient taking differently: Take 10 mg by mouth As Needed. As needed for allergies), Disp: 90 tablet, Rfl: 3  •  lovastatin (MEVACOR) 40 MG tablet, Take 1 tablet by mouth Every Night., Disp: 90 tablet, Rfl: 3  •  Multiple Vitamins-Minerals (ZINC PO), Take 50 mg by mouth Daily. HOLDING FOR SURGERY, Disp: , Rfl:   •  " "nitrofurantoin, macrocrystal-monohydrate, (Macrobid) 100 MG capsule, Take 1 capsule by mouth 2 (Two) Times a Day. For urinary tract infection, Disp: 14 capsule, Rfl: 0  •  VITAMIN K PO, Take 1,000 mcg by mouth Daily. HOLDING FOR SURGERY, Disp: , Rfl:      Allergies:  No Known Allergies    Physical Examination:  /80   Pulse 87   Ht 144.8 cm (57.01\")   Wt 55.8 kg (123 lb)   SpO2 98%   BMI 26.61 kg/m²   General Appearance:  Patient is in no distress.  She is well kept and has an average build.   Psychiatric:  Patient with appropriate mood and affect. Alert and oriented to self, time, and place.    Breast, RIGHT:  small sized, symmetric with the contralateral side.  Breast skin is without erythema, edema, rashes.  There are no visible abnormalities upon inspection during the arm-raising maneuver or with hands on hips in the sitting position. There is no nipple retraction, discharge or nipple/areolar skin changes.There are no masses palpable in the sitting or supine positions.   Mildly tender breast diffusely to palpation.    Breast, LEFT:  small sized, symmetric with the contralateral side.  Breast skin is without erythema, edema, rashes.  There are no visible abnormalities upon inspection during the arm-raising maneuver or with hands on hips in the sitting position. There is no nipple retraction, discharge or nipple/areolar skin changes.  Well-healed periareolar incision left inferior breast from her 2022 excision of radial scar as well as a distortion on 2020 imaging.   There are no masses palpable in the sitting or supine positions. Mildly tender breast diffusely to palpation.    Lymphatic:  There is no axillary, cervical, infraclavicular, or supraclavicular adenopathy bilaterally.  Eyes:  Pupils are round and reactive to light.  Cardiovascular:  Heart rate and rhythm are regular.  Respiratory:  Lungs are clear bilaterally with no crackles or wheezes in any lung field.  Gastrointestinal:  Abdomen is soft, " nondistended, and nontender. .    Musculoskeletal:  Good strength in all 4 extremities.   There is good range of motion in both shoulders.    Skin:  No new skin lesions or rashes on the skin excluding the breast (see breast exam above).      Imagin16   [facility]  REMA SCREENING MAMMO  NAVEED, SAVITA  Scattered fibroglandular densities.  BIRADS category 1: Negative    17 [facility]   REMA DIG MAMMO  NAVEED, SAVITA  Scattered fibroglandular densities. Middle third of the upper-inner quadrant of the right breast, there is an area of focal asymmetry.  Impression: Further evaluation with spot compression and possible targeted right breast sonography.  BIRADS Category 0.    17 E  RT DIAG MAMMO    SAVITA OBRIEN  With additional imagining there is resolution of the area of foal asymmetry in the upper inner quadrant of the right breast.  BIRADS Category 1: Negative.    18           [facility] Bilateral Mammo Screening Tomosynthesis                        Naveed, Savita  Scattered fibroglandular densities. Posterior one-third lower outer quadrant of the right breast,  There has been interval development of microcalcifications.  Impression  There are no findings suspicious for malignancy in the left breast.  Birads Category 0      3/6/18  E  RT DIAG MAMMO    NAVEED, SAVITA  Unilateral right digital spot magnification. There are punctuate monomorphic scattered regional microcalcification seen in the right breast. They have a stable appearance.  BIRADS Category 2: Benign findings.    Bilateral screening mammogram with 3D [facility] on 2019: Heterogeneously dense tissue bilaterally.  BI-RADS 2    2019 BILATERAL MAMMO WITH KONSTANTIN       [facility]        SAVITA NAVEED  Heterogeneously dense.  BI-RADS Category 2.    2020      BILATERAL SCREENING MAMMO WITH KONSTANTIN    [facility]       SAVITA NAVEED  Heterogeneously dense. Middle third upper inner quadrant left breast focal asymmetry.  BI-RADS Category  0.      03/06/2020 LEFT DIAGNOSTIC MAMMO      [facility]        LORI OBRIEN  The area of architectural distortion in the left breast becomes somewhat less apparent. Ultrasound of the upper inner quadrant of the left breast shows several small benign-appearing cysts largest measuring up to approximately 9 mm in size. No solid appearing masses or other sonographic evidence of malignancy is seen. Recommend repeat left breast mammogram only in 6 months.  BR-RADS Category 3: Probably benign findings.      10/06/2020    LEFT DIAGNOSTIC MAMMO WITH KONSTANTIN & LEFT BREAST US    [facility]    LORITINA OBRIEN     MMG:  With additional imaging, there is persistence of an area of architectural distortion in the middle third medial aspect of left breast.    US:  At the 7:00 position on the order of 3 cm from nipple, there is an ill-defined heterogeneous hypoechoic lesion that measures 0.7 cm in greatest dimension. This corresponds to the mammographically visualized area of distortion.  BI-RADS Category 4.    03/16/2022    BILATERAL DIAGNOSTIC MAMMO WITH LIMITED LEFT BREAST US   [facility]    LORI OBRIEN  MMG:  Heterogeneously dense. There is a persistent area of architectural distortion seen in the middle third lower inner quadrant of the left breast. The bowtie-shaped metallic clip is positioned on the order of 1.2 cm medial to the epicenter of the area of distortion. Radiating spicules from the epicenter of distortion are noted.  US:  7 o’clock 2 cm from the nipple, there is a 0.9 x 0.4 x 0.4 cm ill-defined heterogenous hypoechoic region with distortion.   IMPRESSION:  No definite progression since the prior examinations from 2020 is appreciated. However, there remains suspicious regarding the etiology of the architectural distortion. Surgical excision of the area of distortion in the left breast is recommended.  BI-RADS Category 4.    October 26, 2022: Left diagnostic mammogram tomosynthesis [facility].  The breast is heterogenously  dense.  BI-RADS 2      Pathology:   10/19/2020 LEFT US GUIDED BREAST BIOPSY      TASIA OBRIEN   7:00, 3 cm from the nipple in the left breast. 11 g needle. 7 core specimens. A bowtie clip. Bowtie clip at the expected location. Pathology is benign but does not explain distortion and is considered discordant.    PATHOLOGY:  1. Left Breast, 7 o’clock, 3 cm from Nipple, Ultrasound-Guided Biopsies for a 0.7 cm Lesion:  A. Nodular pseudoangiomatous stromal hyperplasia (PASH).  B. Dilated ducts and fragments of cyst wall.  C. No atypical hyperplasia, in situ, nor invasive carcinoma identified.    04/20/2022 MAMMO STEREOTACTIC BREAST BIOPSY       [facility]     LORI OBRIEN  Left breast. 8 gauge Mammotome. Multiple tissue specimens. Tri bell-shaped metallic clip was placed to honey the site. Postbiopsy mammography of the left breast demonstrates placement of a metallic clip at the 9 o’clock position at the site of the pre-existing architectural distortion without evidence for clip migration. The pathology is concordant.  FINAL DIAGNOSIS:  1. Left Breast, 9:00, Stereotactic-Guided Core Needle Biopsy for an Architectural Distortion:  A. Radial scar involved by usual ductal hyperplasia and benign calcifications.    Karen 15, 2022 pathology from [facility] guided excision of radial scar returned as focal residual radial scar.       Final Diagnosis   1. Left Breast, Needle-Localized Excisional Biopsy (9 grams):               A. Focal residual radial scar.               B. Clip present with biopsy site changes and hematoma formation.               C. Background breast parenchyma with dilated cysts and cluster of apocrine cysts.    Electronically signed by Vita cMgowan MD on 6/17/2022 at 1138       Procedures:    Assessment:   Diagnosis Plan   1. Radial scar of left breast        2. Abnormal mammogram        3. Abnormal ultrasound of breast        4. Pseudoangiomatous stromal hyperplasia of breast        5. Hematoma        6.  Language barrier to communication        7. Mastodynia           1-2  -LEFT radial scar 9:00, tribell marker- tribell marker displaced bc of hematoma and not targeted on day of excision    -PASH, 10-19-20- nodular pash on ultrasound biopsy done for abnormal mammogram- bowtie left in place but felt discordant    Karen 15, 2022 pathology from needle localized excision of radial scar and imaging distortion returned as focal residual radial scar (note that the tribell marker was displaced and not targeted, note that the imaging distortion of concern 2020 was included in the targeting/bowtie marker).    Both tribell and bowtie markers absent/removed at time of her  mammogram.  Normal mammogram    5-  Large hematoma LEFT LIQ from stereo 4-20-22    6-  Korean speaking. Had multiple no shows to the office, uncertain as to whether this is from miscommunication due to language barrier. Has anxiety about healthcare due to language barrier.        Plan:  Mrs. Lucio, the  and I reviewed her most recent imaging, images and examination together today.    Her mammogram and exam are in good order.    Her next routine screening mammogram is due 3-7-23 . I gave her this reminder today.   I asked her to please keep up with once annual screening mammogram and exams with primary care or Dr Piper.    With regards to the breast pain  We discussed that the differential diagnosis of breast pain includes, but is not limited to: fibrocystic condition, macrocysts, fibroadenomas, breast cancer,  trauma to the breast or chest wall, inflammation or  other musculoskeletal disturbances of the chest wall.  There are no concerning findings on her examination today or on her most recent imaging for a focal cause of her pain- ie a mass, inflammation, or trauma. Both her exam and imaging are in good order. The most likely etiology of her breast pain is fibrocystic condition, meaning a hormonal responsiveness of the breast tissue.  We  discussed that this pain can be aggravated by many things, including stress, caffeine, and fluctuations in hormone levels.  We discussed the most common interventions to alleviate her symptoms, including wearing a supportive bra, reducing caffeine intake, oral vitamin E 400 units qday or BID, or evening primrose oil 2000-3000mg orally daily. She understood.       Of note,   I believe that there have been past miscommunications related to her language barriers that are problematic and have asked her to please have a  with her any time she seeks healthcare or has phone calls regarding healthcare.   She missed several appointments/documented despite confirming with her  , whom she tells me speaks some english. I have asked her to please have a  ( in addition to her  if he is present) moving forward in the interest of her safety.    Specifically, she was unaware in 2020 that she needed the 2020 imaging finding excised.      Kerline Mesa MD      Today I spent 20 minutes doing the following: Reviewing records, labs, outside imaging and reports in preparation for the patient visit; obtaining medical history; performing the physical exam; counseling and educating the patient and any available family or caregivers; ordering medications, tests or procedures; coordinating care with any other physicians on her care team as needed, and documenting all of the above in the medical record as well as sending communications with her other healthcare professionals.    Next Appointment:  Return for any future concerns.      EMR Dragon/transcription disclaimer:    Much of this encounter note is an electronic transcription/translocation of spoken language to printed text.  The electronic translation of spoken language may permit erroneous, or at times, nonsensical words or phrases to be inadvertently transcribed.  Although I have reviewed the note from such areas, some may still  exist.

## 2022-12-08 ENCOUNTER — TELEPHONE (OUTPATIENT)
Dept: SPORTS MEDICINE | Facility: CLINIC | Age: 57
End: 2022-12-08

## 2022-12-08 NOTE — TELEPHONE ENCOUNTER
Patient called stating that she is still having pain in her pelvis and urine is still to yellow.    She stated that she has seen her gynecologist and nothing was found. Patient started taking today the medication that was prescribed back in 10/31.    She would like to know if she needs to be referred to a urologist and is it okay to continue taking the medication.     She is also requesting her recent lab results.     Please advise

## 2022-12-09 NOTE — TELEPHONE ENCOUNTER
I relayed the message to the patient in Faroese, she stated that she had not notified  about the pain she is having, but she will go ahead and give her a call. If for some reason she is having any issues, she would give our office a call back to get the urology referral placed. No further action needed at this time. Thank you!    -CHARLES Perkins

## 2022-12-13 ENCOUNTER — TELEPHONE (OUTPATIENT)
Dept: OBSTETRICS AND GYNECOLOGY | Facility: CLINIC | Age: 57
End: 2022-12-13

## 2022-12-13 NOTE — TELEPHONE ENCOUNTER
Provider: VANI QUISPE DO  Caller:LORI OBRIEN  Relationship to Patient: SELF  Phone Number: [phone]  Reason for Call: PAIN IN LOWER STOMACH  When was the patient last seen: 11.22.22    PATIENT CALLED IN STATING THAT SHE WAS IN PAIN, IN HER LOWER STOMACH AND RATED IT AT 8 OUT OF 10.  APPT HAS BEEN MADE FOR 1.03.23. PATIENT WOULD TO KNOW IF SHE CAN TAKE IBUPROFEN FOR THE PAIN SHE HAS TAKEN TYLENOL AND HAS NOT GOT ANY RELIEF.    PATIENT HAS ALSO BEEN ADVISE IF PAIN WORSENS TO GO THE ER OR UC. PLEASE CALL PATIENT BACK AT THE NUMBER LISTED YOU NEED AN

## 2022-12-15 ENCOUNTER — HOSPITAL ENCOUNTER (EMERGENCY)
Facility: HOSPITAL | Age: 57
Discharge: HOME | End: 2022-12-15
Attending: EMERGENCY MEDICINE | Admitting: EMERGENCY MEDICINE

## 2022-12-15 ENCOUNTER — APPOINTMENT (OUTPATIENT)
Dept: CT IMAGING | Facility: HOSPITAL | Age: 57
End: 2022-12-15

## 2022-12-15 VITALS
DIASTOLIC BLOOD PRESSURE: 79 MMHG | WEIGHT: 127 LBS | HEART RATE: 76 BPM | RESPIRATION RATE: 16 BRPM | SYSTOLIC BLOOD PRESSURE: 132 MMHG | HEIGHT: 59 IN | OXYGEN SATURATION: 97 % | TEMPERATURE: 97.9 F | BODY MASS INDEX: 25.6 KG/M2

## 2022-12-15 DIAGNOSIS — R10.9 ABDOMINAL PAIN, UNSPECIFIED ABDOMINAL LOCATION: Primary | ICD-10-CM

## 2022-12-15 DIAGNOSIS — N83.201 RIGHT OVARIAN CYST: ICD-10-CM

## 2022-12-15 LAB
ALBUMIN SERPL-MCNC: 4.8 G/DL (ref 3.5–5.2)
ALBUMIN/GLOB SERPL: 1.8 G/DL
ALP SERPL-CCNC: 52 U/L (ref 39–117)
ALT SERPL W P-5'-P-CCNC: 22 U/L (ref 1–33)
ANION GAP SERPL CALCULATED.3IONS-SCNC: 5.3 MMOL/L (ref 5–15)
AST SERPL-CCNC: 15 U/L (ref 1–32)
BACTERIA UR QL AUTO: ABNORMAL /HPF
BASOPHILS # BLD AUTO: 0.04 10*3/MM3 (ref 0–0.2)
BASOPHILS NFR BLD AUTO: 0.7 % (ref 0–1.5)
BILIRUB SERPL-MCNC: 0.4 MG/DL (ref 0–1.2)
BILIRUB UR QL STRIP: NEGATIVE
BUN SERPL-MCNC: 11 MG/DL (ref 6–20)
BUN/CREAT SERPL: 16.4 (ref 7–25)
CALCIUM SPEC-SCNC: 9.4 MG/DL (ref 8.6–10.5)
CHLORIDE SERPL-SCNC: 103 MMOL/L (ref 98–107)
CLARITY UR: ABNORMAL
CO2 SERPL-SCNC: 29.7 MMOL/L (ref 22–29)
COLOR UR: YELLOW
CREAT SERPL-MCNC: 0.67 MG/DL (ref 0.57–1)
D-LACTATE SERPL-SCNC: 1.1 MMOL/L (ref 0.5–2)
DEPRECATED RDW RBC AUTO: 39.4 FL (ref 37–54)
EGFRCR SERPLBLD CKD-EPI 2021: 103.4 ML/MIN/1.73
EOSINOPHIL # BLD AUTO: 0.06 10*3/MM3 (ref 0–0.4)
EOSINOPHIL NFR BLD AUTO: 1.1 % (ref 0.3–6.2)
ERYTHROCYTE [DISTWIDTH] IN BLOOD BY AUTOMATED COUNT: 14.5 % (ref 12.3–15.4)
GLOBULIN UR ELPH-MCNC: 2.7 GM/DL
GLUCOSE SERPL-MCNC: 113 MG/DL (ref 65–99)
GLUCOSE UR STRIP-MCNC: NEGATIVE MG/DL
HCT VFR BLD AUTO: 43.2 % (ref 34–46.6)
HGB BLD-MCNC: 13.4 G/DL (ref 12–15.9)
HGB UR QL STRIP.AUTO: ABNORMAL
HOLD SPECIMEN: NORMAL
HOLD SPECIMEN: NORMAL
HYALINE CASTS UR QL AUTO: ABNORMAL /LPF
KETONES UR QL STRIP: NEGATIVE
LEUKOCYTE ESTERASE UR QL STRIP.AUTO: NEGATIVE
LIPASE SERPL-CCNC: 28 U/L (ref 13–60)
LYMPHOCYTES # BLD AUTO: 1.96 10*3/MM3 (ref 0.7–3.1)
LYMPHOCYTES NFR BLD AUTO: 34.3 % (ref 19.6–45.3)
MCH RBC QN AUTO: 23.7 PG (ref 26.6–33)
MCHC RBC AUTO-ENTMCNC: 31 G/DL (ref 31.5–35.7)
MCV RBC AUTO: 76.3 FL (ref 79–97)
MONOCYTES # BLD AUTO: 0.44 10*3/MM3 (ref 0.1–0.9)
MONOCYTES NFR BLD AUTO: 7.7 % (ref 5–12)
NEUTROPHILS NFR BLD AUTO: 3.2 10*3/MM3 (ref 1.7–7)
NEUTROPHILS NFR BLD AUTO: 56 % (ref 42.7–76)
NITRITE UR QL STRIP: NEGATIVE
PH UR STRIP.AUTO: 7.5 [PH] (ref 5–8)
PLATELET # BLD AUTO: 293 10*3/MM3 (ref 140–450)
PMV BLD AUTO: 9.8 FL (ref 6–12)
POTASSIUM SERPL-SCNC: 3.4 MMOL/L (ref 3.5–5.2)
PROT SERPL-MCNC: 7.5 G/DL (ref 6–8.5)
PROT UR QL STRIP: NEGATIVE
RBC # BLD AUTO: 5.66 10*6/MM3 (ref 3.77–5.28)
RBC # UR STRIP: ABNORMAL /HPF
REF LAB TEST METHOD: ABNORMAL
SODIUM SERPL-SCNC: 138 MMOL/L (ref 136–145)
SP GR UR STRIP: 1.01 (ref 1–1.03)
SQUAMOUS #/AREA URNS HPF: ABNORMAL /HPF
UROBILINOGEN UR QL STRIP: ABNORMAL
WBC # UR STRIP: ABNORMAL /HPF
WBC NRBC COR # BLD: 5.71 10*3/MM3 (ref 3.4–10.8)
WHOLE BLOOD HOLD COAG: NORMAL
WHOLE BLOOD HOLD SPECIMEN: NORMAL

## 2022-12-15 PROCEDURE — 85025 COMPLETE CBC W/AUTO DIFF WBC: CPT | Performed by: EMERGENCY MEDICINE

## 2022-12-15 PROCEDURE — 74177 CT ABD & PELVIS W/CONTRAST: CPT

## 2022-12-15 PROCEDURE — 83605 ASSAY OF LACTIC ACID: CPT | Performed by: EMERGENCY MEDICINE

## 2022-12-15 PROCEDURE — 80053 COMPREHEN METABOLIC PANEL: CPT | Performed by: EMERGENCY MEDICINE

## 2022-12-15 PROCEDURE — 99283 EMERGENCY DEPT VISIT LOW MDM: CPT

## 2022-12-15 PROCEDURE — 96374 THER/PROPH/DIAG INJ IV PUSH: CPT

## 2022-12-15 PROCEDURE — 83690 ASSAY OF LIPASE: CPT | Performed by: EMERGENCY MEDICINE

## 2022-12-15 PROCEDURE — 81001 URINALYSIS AUTO W/SCOPE: CPT | Performed by: EMERGENCY MEDICINE

## 2022-12-15 PROCEDURE — 25010000002 IOPAMIDOL 61 % SOLUTION: Performed by: EMERGENCY MEDICINE

## 2022-12-15 PROCEDURE — 25010000002 MORPHINE PER 10 MG: Performed by: EMERGENCY MEDICINE

## 2022-12-15 RX ORDER — DICYCLOMINE HYDROCHLORIDE 10 MG/1
10 CAPSULE ORAL 3 TIMES DAILY PRN
Qty: 21 CAPSULE | Refills: 0 | Status: SHIPPED | OUTPATIENT
Start: 2022-12-15 | End: 2023-01-11 | Stop reason: SDUPTHER

## 2022-12-15 RX ORDER — MORPHINE SULFATE 2 MG/ML
2 INJECTION, SOLUTION INTRAMUSCULAR; INTRAVENOUS ONCE
Status: COMPLETED | OUTPATIENT
Start: 2022-12-15 | End: 2022-12-15

## 2022-12-15 RX ORDER — IBUPROFEN 800 MG/1
800 TABLET ORAL EVERY 8 HOURS PRN
Qty: 21 TABLET | Refills: 0 | Status: SHIPPED | OUTPATIENT
Start: 2022-12-15

## 2022-12-15 RX ORDER — SODIUM CHLORIDE 0.9 % (FLUSH) 0.9 %
10 SYRINGE (ML) INJECTION AS NEEDED
Status: DISCONTINUED | OUTPATIENT
Start: 2022-12-15 | End: 2022-12-15 | Stop reason: HOSPADM

## 2022-12-15 RX ADMIN — IOPAMIDOL 85 ML: 612 INJECTION, SOLUTION INTRAVENOUS at 16:39

## 2022-12-15 RX ADMIN — MORPHINE SULFATE 2 MG: 2 INJECTION, SOLUTION INTRAMUSCULAR; INTRAVENOUS at 15:04

## 2022-12-15 NOTE — ED TRIAGE NOTES
"Pt to ER from home, pt c/o pelvic pain worse on right side x4 months, seen at PCP and referred to GYN, was seen at GYN 2 weeks ago and states \"they checked everything all my organs and said it was all okay\". Pt states GYN instructed pt to come to ER if symptoms continued. Denies vaginal bleeding or discharge. States pain seems to be worse at night. Pt aaox4, abc's intact, ambulatory with steady gait, NAD noted at this time.   Pt placed in mask at triage. This RN also wearing a mask.        [id number] used for triage.   "

## 2022-12-15 NOTE — ED PROVIDER NOTES
EMERGENCY DEPARTMENT ENCOUNTER    Room Number:  14/14  Date of encounter:  12/15/2022  PCP: Diego Crum MD  Patient Care Team:  Diego Crum MD as PCP - General  Diego Crum MD as PCP - Family Medicine   Historian: Patient, family, video     HPI:  Chief Complaint: Abdominal pain  A complete HPI/ROS/PMH/PSH/SH/FH are unobtainable due to: Nothing    Context: Savita Lucio is a 57 y.o. female who presents to the ED c/o lower abdominal pain for the last several months.  She reports that she has seen her primary care doctor who did a urinalysis that showed some blood in her urine.  She states she was referred then to gynecology.  She states that the gynecologist did a full exam and an ultrasound.  She states that she was not given any medicine for pain and so she has been taking Tylenol and diclofenac.  She states today the pain got much worse.  She was directed to the emergency room by her gynecologist.  The pain does not radiate.  It is located primarily in the lower abdomen and the pelvis bilaterally.  She states sometimes she has some pain in the right side upper abdomen.  She states the pain gets better with having a bowel movement.  Nothing seems to make it worse.  The pain is intermittent and comes and goes.    Prior record review: Gynecology note dated 11/22/2022 with a normal pelvic ultrasound.  Her note states that if the patient gets worse or has persistent pain she would need a CT of her abdomen pelvis.    PAST MEDICAL HISTORY  Active Ambulatory Problems     Diagnosis Date Noted   • Benign essential HTN 07/15/2016   • HLD (hyperlipidemia) 07/15/2016   • Prediabetes 07/15/2016   • Allergic rhinitis 07/15/2016   • Acid reflux 07/15/2016   • Other chest pain 05/27/2017   • Perimenopause 07/11/2019   • Radial scar of left breast 04/25/2022     Resolved Ambulatory Problems     Diagnosis Date Noted   • Mastalgia 05/27/2017   • Mastalgia 05/27/2017     Past Medical History:   Diagnosis Date    • Anemia    • Anxiety    • Breast pain, left    • Breast pain, right    • Hemorrhoid    • History of migraine    • History of Papanicolaou smear of cervix 2015   • History of tachycardia    • Hypertension    • Lab test positive for detection of COVID-19 virus        The patient has started, but not completed, their COVID-19 vaccination series.    PAST SURGICAL HISTORY  Past Surgical History:   Procedure Laterality Date   • BREAST BIOPSY Left     Benign   • BREAST BIOPSY Left 6/15/2022    Procedure: left breast needle localized excision of radial scar.;  Surgeon: Kerline Mesa MD;  Location: [facility];  Service: General;  Laterality: Left;   •  SECTION     • COLONOSCOPY     • CYSTECTOMY Left     Below left buttock   • ENDOSCOPY     • HEMORRHOIDECTOMY           FAMILY HISTORY  Family History   Problem Relation Age of Onset   • Heart attack Mother    • Hyperlipidemia Father    • Hyperlipidemia Sister    • No Known Problems Son    • Cervical cancer Cousin    • Malig Hyperthermia Neg Hx          SOCIAL HISTORY  Social History     Socioeconomic History   • Marital status:    Tobacco Use   • Smoking status: Never   • Smokeless tobacco: Never   Vaping Use   • Vaping Use: Never used   Substance and Sexual Activity   • Alcohol use: No     Comment: denies caffeine use    • Drug use: No   • Sexual activity: Yes         ALLERGIES  Patient has no known allergies.        REVIEW OF SYSTEMS  Review of Systems   No dysuria, negative hematuria,, no nausea, no vomiting, no headache, no diarrhea, positive constipation  All systems reviewed and negative except for those discussed in HPI.       PHYSICAL EXAM    I have reviewed the triage vital signs and nursing notes.    ED Triage Vitals   Temp Heart Rate Resp BP SpO2   12/15/22 1325 12/15/22 1325 12/15/22 1325 12/15/22 1336 12/15/22 1325   97.9 °F (36.6 °C) 83 16 152/90 99 %      Temp src Heart Rate Source Patient Position BP Location FiO2 (%)    12/15/22 1325 12/15/22 1325 -- -- --   Tympanic Monitor          Physical Exam  GENERAL: Awake, alert, no acute distress  SKIN: Warm, dry  HENT: Normocephalic, atraumatic  EYES: no scleral icterus  CV: regular rhythm, regular rate  RESPIRATORY: normal effort, lungs clear  ABDOMEN: soft, nontender, nondistended  MUSCULOSKELETAL: no deformity  NEURO: alert, moves all extremities, follows commands          LAB RESULTS  Recent Results (from the past 24 hour(s))   Comprehensive Metabolic Panel    Collection Time: 12/15/22  2:49 PM    Specimen: Blood   Result Value Ref Range    Glucose 113 (H) 65 - 99 mg/dL    BUN 11 6 - 20 mg/dL    Creatinine 0.67 0.57 - 1.00 mg/dL    Sodium 138 136 - 145 mmol/L    Potassium 3.4 (L) 3.5 - 5.2 mmol/L    Chloride 103 98 - 107 mmol/L    CO2 29.7 (H) 22.0 - 29.0 mmol/L    Calcium 9.4 8.6 - 10.5 mg/dL    Total Protein 7.5 6.0 - 8.5 g/dL    Albumin 4.80 3.50 - 5.20 g/dL    ALT (SGPT) 22 1 - 33 U/L    AST (SGOT) 15 1 - 32 U/L    Alkaline Phosphatase 52 39 - 117 U/L    Total Bilirubin 0.4 0.0 - 1.2 mg/dL    Globulin 2.7 gm/dL    A/G Ratio 1.8 g/dL    BUN/Creatinine Ratio 16.4 7.0 - 25.0    Anion Gap 5.3 5.0 - 15.0 mmol/L    eGFR 103.4 >60.0 mL/min/1.73   Lipase    Collection Time: 12/15/22  2:49 PM    Specimen: Blood   Result Value Ref Range    Lipase 28 13 - 60 U/L   Lactic Acid, Plasma    Collection Time: 12/15/22  2:49 PM    Specimen: Blood   Result Value Ref Range    Lactate 1.1 0.5 - 2.0 mmol/L   Green Top (Gel)    Collection Time: 12/15/22  2:49 PM   Result Value Ref Range    Extra Tube Hold for add-ons.    Lavender Top    Collection Time: 12/15/22  2:49 PM   Result Value Ref Range    Extra Tube hold for add-on    Gold Top - SST    Collection Time: 12/15/22  2:49 PM   Result Value Ref Range    Extra Tube Hold for add-ons.    Light Blue Top    Collection Time: 12/15/22  2:49 PM   Result Value Ref Range    Extra Tube Hold for add-ons.    CBC Auto Differential    Collection Time: 12/15/22   2:49 PM    Specimen: Blood   Result Value Ref Range    WBC 5.71 3.40 - 10.80 10*3/mm3    RBC 5.66 (H) 3.77 - 5.28 10*6/mm3    Hemoglobin 13.4 12.0 - 15.9 g/dL    Hematocrit 43.2 34.0 - 46.6 %    MCV 76.3 (L) 79.0 - 97.0 fL    MCH 23.7 (L) 26.6 - 33.0 pg    MCHC 31.0 (L) 31.5 - 35.7 g/dL    RDW 14.5 12.3 - 15.4 %    RDW-SD 39.4 37.0 - 54.0 fl    MPV 9.8 6.0 - 12.0 fL    Platelets 293 140 - 450 10*3/mm3    Neutrophil % 56.0 42.7 - 76.0 %    Lymphocyte % 34.3 19.6 - 45.3 %    Monocyte % 7.7 5.0 - 12.0 %    Eosinophil % 1.1 0.3 - 6.2 %    Basophil % 0.7 0.0 - 1.5 %    Neutrophils, Absolute 3.20 1.70 - 7.00 10*3/mm3    Lymphocytes, Absolute 1.96 0.70 - 3.10 10*3/mm3    Monocytes, Absolute 0.44 0.10 - 0.90 10*3/mm3    Eosinophils, Absolute 0.06 0.00 - 0.40 10*3/mm3    Basophils, Absolute 0.04 0.00 - 0.20 10*3/mm3   Urinalysis With Microscopic If Indicated (No Culture) - Urine, Clean Catch    Collection Time: 12/15/22  3:06 PM    Specimen: Urine, Clean Catch   Result Value Ref Range    Color, UA Yellow Yellow, Straw    Appearance, UA Cloudy (A) Clear    pH, UA 7.5 5.0 - 8.0    Specific Gravity, UA 1.009 1.005 - 1.030    Glucose, UA Negative Negative    Ketones, UA Negative Negative    Bilirubin, UA Negative Negative    Blood, UA Trace (A) Negative    Protein, UA Negative Negative    Leuk Esterase, UA Negative Negative    Nitrite, UA Negative Negative    Urobilinogen, UA 0.2 E.U./dL 0.2 - 1.0 E.U./dL   Urinalysis, Microscopic Only - Urine, Clean Catch    Collection Time: 12/15/22  3:06 PM    Specimen: Urine, Clean Catch   Result Value Ref Range    RBC, UA 6-12 (A) None Seen, 0-2 /HPF    WBC, UA 0-2 None Seen, 0-2 /HPF    Bacteria, UA None Seen None Seen /HPF    Squamous Epithelial Cells, UA 3-6 (A) None Seen, 0-2 /HPF    Hyaline Casts, UA 0-2 None Seen /LPF    Methodology Automated Microscopy        Ordered the above labs and independently reviewed the results.        RADIOLOGY  CT Abdomen Pelvis With Contrast    Result  Date: 12/15/2022  CT ABDOMEN PELVIS W CONTRAST-  INDICATIONS: Pelvic pain for months  TECHNIQUE: Radiation dose reduction techniques were utilized, including automated exposure control and exposure modulation based on body size. Enhanced ABDOMEN AND PELVIS CT  COMPARISON: 07/22/2016  FINDINGS:  Hepatic cysts are present, showing interval increase in size, now as large as 4.8 cm (previously as large as 1.8 cm). Right renal low densities are seen that are too small to characterize.  A right adnexal cyst measures 1.5 cm (on the prior exam, a 1.1 cm right ovarian follicle/adnexal cyst was present).  Otherwise unremarkable appearance of the liver, gallbladder, spleen, adrenal glands, pancreas, kidneys, bladder.  No bowel obstruction or abnormal bowel thickening is identified. The appendix does not appear inflamed.  No free intraperitoneal gas or free fluid.  Scattered small mesenteric and para-aortic lymph nodes are seen that are not significant by size criteria.  Abdominal aorta is not aneurysmal. Aortic and other arterial calcifications are present.  The lung bases are clear.  Degenerative changes are seen in the spine. No acute fracture is identified.          1. No acute inflammatory process of bowel is identified, follow up as indications persist. 2. No obstructive uropathy. Right adnexal cyst.  This report was finalized on 12/15/2022 4:57 PM by Dr. Wilberto Abdi M.D.        I ordered the above noted radiological studies. Reviewed by me and discussed with radiologist.  See dictation for official radiology interpretation.      PROCEDURES    Procedures      MEDICATIONS GIVEN IN ER    Medications   sodium chloride 0.9 % flush 10 mL (has no administration in time range)   morphine injection 2 mg (2 mg Intravenous Given 12/15/22 9714)   iopamidol (ISOVUE-300) 61 % injection 100 mL (85 mL Intravenous Given by Other 12/15/22 1639)         PROGRESS, DATA ANALYSIS, CONSULTS, AND MEDICAL DECISION MAKING    All labs have  been independently reviewed by me.  All radiology studies have been reviewed by me and discussed with radiologist dictating the report.   EKG's independently viewed and interpreted by me.  Discussion below represents my analysis of pertinent findings related to patient's condition, differential diagnosis, treatment plan and final disposition.    Differential diagnosis includes but is not limited to diverticulitis, colitis, UTI, kidney stone, fecal impaction, urinary retention.    ED Course as of 12/15/22 1740   Thu Dec 15, 2022   1651 I reviewed the CT of the abdomen pelvis images and I see liver cysts [TR]   1702 CT scan shows a right adnexal cyst.  No other acute process. [TR]   1737 I reviewed the work-up and findings with the patient and family at the bedside.  Her blood cell count is normal.  Her hemoglobin is normal.  Her urinalysis shows no signs of infection.  Her chemistries are generally unremarkable.  She has a normal lactic acid.  Her CT scan shows the known ovarian cyst but no other acute changes.  Plan to provide her with medication for pain, have her follow-up with her primary care doctor, gynecologist, and refer her to gastroenterology.  She is agreeable. [TR]      ED Course User Index  [TR] Blake Arriaga MD           PPE: The patient wore a mask and I wore an N95 mask throughout the entire patient encounter.       AS OF 17:40 EST VITALS:    BP - 137/84  HR - 73  TEMP - 97.9 °F (36.6 °C) (Tympanic)  O2 SATS - 97%        DIAGNOSIS  Final diagnoses:   Abdominal pain, unspecified abdominal location   Right ovarian cyst         DISPOSITION  ED Disposition     ED Disposition   Discharge    Condition   Stable    Comment   --                Note Disclaimer: At [facility], we believe that sharing information builds trust and better relationships. You are receiving this note because you recently visited [facility]. It is possible you will see health information before a provider has talked with you  about it. This kind of information can be easy to misunderstand. To help you fully understand what it means for your health, we urge you to discuss this note with your provider.       Blake Arriaga MD  12/15/22 4656

## 2023-01-03 ENCOUNTER — TELEPHONE (OUTPATIENT)
Dept: OBSTETRICS AND GYNECOLOGY | Facility: CLINIC | Age: 58
End: 2023-01-03

## 2023-01-03 NOTE — TELEPHONE ENCOUNTER
Caller: Savita Lucio    Relationship to patient: Self    Best call back number: [phone]    Patient is needing: PT CALLED TO R/S SHE IS NOT FEELING WELL. R/S FOR 1-24-23.

## 2023-01-11 ENCOUNTER — OFFICE VISIT (OUTPATIENT)
Dept: GASTROENTEROLOGY | Facility: CLINIC | Age: 58
End: 2023-01-11
Payer: COMMERCIAL

## 2023-01-11 VITALS
WEIGHT: 117.1 LBS | HEIGHT: 59 IN | DIASTOLIC BLOOD PRESSURE: 64 MMHG | OXYGEN SATURATION: 100 % | SYSTOLIC BLOOD PRESSURE: 124 MMHG | TEMPERATURE: 97.1 F | HEART RATE: 84 BPM | BODY MASS INDEX: 23.61 KG/M2

## 2023-01-11 DIAGNOSIS — R14.3 EXCESSIVE GAS: Chronic | ICD-10-CM

## 2023-01-11 DIAGNOSIS — N83.201 RIGHT OVARIAN CYST: ICD-10-CM

## 2023-01-11 DIAGNOSIS — R10.31 RIGHT LOWER QUADRANT ABDOMINAL PAIN: Chronic | ICD-10-CM

## 2023-01-11 DIAGNOSIS — R10.30 LOWER ABDOMINAL PAIN: Primary | Chronic | ICD-10-CM

## 2023-01-11 PROCEDURE — 99214 OFFICE O/P EST MOD 30 MIN: CPT | Performed by: NURSE PRACTITIONER

## 2023-01-11 RX ORDER — DICYCLOMINE HYDROCHLORIDE 10 MG/1
10 CAPSULE ORAL 3 TIMES DAILY PRN
Qty: 90 CAPSULE | Refills: 1 | Status: SHIPPED | OUTPATIENT
Start: 2023-01-11

## 2023-01-11 NOTE — PROGRESS NOTES
Chief Complaint   Patient presents with   • Abdominal Pain         History of Present Illness  57-year-old female presents the office today for evaluation of abdominal pain.  #[id number] was present today during the visit.  CT of the abdomen pelvis was performed on 12/15/2022 revealing hepatic cysts, renal densities, and right adnexal cyst.  No acute inflammatory process of the bowel was identified. She reports an upcoming appointment with gynecology.     She reports abdominal pain in her lower abdomen that began about 4 months ago. She reports that about 1 month ago the pain got more intense and began that she is also having some pain in her RLQ. She reports that when she eats meat she will have abdominal pain and bloating. If she exercises or eats more fruit she will have more frequent Bms and her abdominal pain will improve. She is not able to eat heavy foods secondary to pain. Dicyclomine has worked well to manage abdominal discomfort. She reports an average of 1-2 stools per day that are of normal consistency. If she drinks a green smoothie stool is a little watery. She denies any rectal bleeding. She reports that when she becomes tense the pain worsen.     She reports her last colonoscopy was about 5 years ago. She denies any abnormal findings or colon polyps. She denies any family history of colon polyps.     She denies any upper GI symptoms. She does report gas that is located more in her lower abdomen. She has not tried a probiotic or any anti-gas medications OTC.     Review of Systems   Constitutional: Negative for fever and unexpected weight change.   HENT: Negative for trouble swallowing.    Cardiovascular: Negative for chest pain.   Gastrointestinal: Positive for abdominal pain and diarrhea. Negative for abdominal distention, anal bleeding, blood in stool, constipation, nausea, rectal pain and vomiting.      Result Review :       CT Abdomen Pelvis With Contrast (12/15/2022 16:41)  Lipase  "(12/15/2022 14:49)  CBC & Differential (12/15/2022 14:49)  Comprehensive Metabolic Panel (12/15/2022 14:49)    Vital Signs:   /64   Pulse 84   Temp 97.1 °F (36.2 °C)   Ht 149.9 cm (59\")   Wt 53.1 kg (117 lb 1.6 oz)   SpO2 100%   BMI 23.65 kg/m²     Body mass index is 23.65 kg/m².     Physical Exam  Vitals reviewed.   Constitutional:       Appearance: Normal appearance.   HENT:      Head: Normocephalic.      Nose: Nose normal.      Mouth/Throat:      Mouth: Mucous membranes are moist.   Eyes:      General: No scleral icterus.     Extraocular Movements: Extraocular movements intact.   Cardiovascular:      Rate and Rhythm: Normal rate and regular rhythm.      Pulses: Normal pulses.      Heart sounds: Normal heart sounds.   Pulmonary:      Effort: Pulmonary effort is normal. No respiratory distress.      Breath sounds: Normal breath sounds.   Abdominal:      General: Abdomen is flat. Bowel sounds are normal. There is no distension.      Palpations: Abdomen is soft. There is no mass.      Tenderness: There is no abdominal tenderness. There is no guarding.   Musculoskeletal:         General: Normal range of motion.      Cervical back: Normal range of motion and neck supple.   Skin:     General: Skin is warm and dry.   Neurological:      General: No focal deficit present.      Mental Status: She is alert and oriented to person, place, and time.   Psychiatric:         Mood and Affect: Mood normal.         Behavior: Behavior normal.         Thought Content: Thought content normal.         Judgment: Judgment normal.       Assessment and Plan    Diagnoses and all orders for this visit:    1. Lower abdominal pain (Primary)    2. Right lower quadrant abdominal pain    3. Excessive gas    4. Right ovarian cyst    Other orders  -     dicyclomine (BENTYL) 10 MG capsule; Take 1 capsule by mouth 3 (Three) Times a Day As Needed (abdominal cramping, fecal urgency, and diarrhea).  Dispense: 90 capsule; Refill: 1       I " spent 26 minutes caring for Savita on this date of service. This time includes time spent by me in the following activities:preparing for the visit, reviewing tests, performing a medically appropriate examination and/or evaluation , counseling and educating the patient/family/caregiver, ordering medications, tests, or procedures, documenting information in the medical record and independently interpreting results and communicating that information with the patient/family/caregiver    Patient Instructions   1.  Recommend that you keep your upcoming scheduled appointment with gynecology for further evaluation of right ovarian cyst in the setting of right lower quadrant abdominal pain.     2.    May continue use of dicyclomine 1 tab up to 4 x daily as needed for abdominal pain, fecal urgency, and diarrhea. New prescription has been sent to your pharmacy accordingly.     3.    We also recommend use of a daily probiotic such as [employer] [facility]-or its generic equivalent. This is available OTC at your local grocery or pharmacy. We recommend one capsule daily.     4.   Recommend office follow up in 3 months for reassessment of symptoms.      Discussion:     Previous imaging results were reviewed with patient today during her office visit via use of , number listed above.  She reports an upcoming appoint with gynecology.  Patient defers colonoscopy at this time and would like to see if her symptoms are related to her right ovarian cyst first, which is very reasonable.  She states that the dicyclomine did help with the pain, but needs a refill.  Refills were provided.  Patient was also recommended to start a daily probiotic to help with excess gas.  We will plan to follow-up with patient in 3 months in office for reassessment of symptoms, or sooner should symptoms worsen or fail to improve.  If symptoms fail to improve would recommend colonoscopy for further evaluation.  Patient verbalized understanding above  plan of care and is in agreement.  All questions answered and support provided.    Addendum:    After additional review of the patient's CT scan, we will proceed with a liver ultrasound for further clarification and identification of her liver cyst as there has been interval change in size.  Orders have been placed accordingly and additional recommendations to be made following results of liver ultrasound.  Dana MCKINNEY    EMR Dragon/Transcription Disclaimer:  This document has been Dictated utilizing Dragon dictation.

## 2023-01-11 NOTE — PATIENT INSTRUCTIONS
Recommend that you keep your upcoming scheduled appointment with gynecology for further evaluation of right ovarian cyst in the setting of right lower quadrant abdominal pain.     2.    May continue use of dicyclomine 1 tab up to 4 x daily as needed for abdominal pain, fecal urgency, and diarrhea. New prescription has been sent to your pharmacy accordingly.     3.    We also recommend use of a daily probiotic such as [employer] [facility]-or its generic equivalent. This is available OTC at your local grocery or pharmacy. We recommend one capsule daily.     4.   Recommend office follow up in 3 months for reassessment of symptoms.

## 2023-01-24 ENCOUNTER — OFFICE VISIT (OUTPATIENT)
Dept: OBSTETRICS AND GYNECOLOGY | Facility: CLINIC | Age: 58
End: 2023-01-24
Payer: COMMERCIAL

## 2023-01-24 VITALS
BODY MASS INDEX: 23.51 KG/M2 | HEIGHT: 59 IN | DIASTOLIC BLOOD PRESSURE: 84 MMHG | SYSTOLIC BLOOD PRESSURE: 120 MMHG | WEIGHT: 116.6 LBS

## 2023-01-24 DIAGNOSIS — Z12.31 ENCOUNTER FOR SCREENING MAMMOGRAM FOR MALIGNANT NEOPLASM OF BREAST: ICD-10-CM

## 2023-01-24 DIAGNOSIS — R10.30 LOWER ABDOMINAL PAIN: ICD-10-CM

## 2023-01-24 DIAGNOSIS — R10.2 PELVIC PAIN: Primary | ICD-10-CM

## 2023-01-24 DIAGNOSIS — K76.89 LIVER CYST: ICD-10-CM

## 2023-01-24 DIAGNOSIS — R10.31 RIGHT LOWER QUADRANT ABDOMINAL PAIN: Primary | ICD-10-CM

## 2023-01-24 PROCEDURE — 99214 OFFICE O/P EST MOD 30 MIN: CPT | Performed by: OBSTETRICS & GYNECOLOGY

## 2023-01-24 NOTE — LETTER
"January 24, 2023     FAYE Weston  [address]  [facility] [address]  [facility] [address]    Patient: Savita Lucio   YOB: 1965   Date of Visit: 1/24/2023       Dear FAYE Kirkland:    Thank you for referring Savita Lucio to me for evaluation. Below are the relevant portions of my assessment and plan of care.    If you have questions, please do not hesitate to call me. I look forward to following Savita along with you.         Sincerely,        Alina Piper DO        CC: No Recipients  Alina Piper DO  01/24/23 1028  Sign when Signing Visit  PROBLEM VISIT    Chief Complaint: pelvic pain      Savita Lucio is a 57 y.o. patient who presents for follow up of pelvic pain. Pt presents for fu of pelvic pain. Pt was seen in 11/2022 for pelvic pain- specifically in right mid to RLQ. Pt had US performed revealing sonolucent area within right ovary measuring 1.5 x 1.4 x 1.1cm. A CT scan revealed right cyst stability. A hepatic cyst was noted to be enlarging from 1.8 to 4.8cm. Pt was not aware of the hepatic cyst. She saw GI and the note states the hepatic cyst but did not have recommendations. She was told to return to us about the ovarian cyst.     Chief Complaint   Patient presents with   • Abdominal Pain             The following portions of the patient's history were reviewed and updated as appropriate: allergies, current medications and problem list.    Review of Systems   Constitutional: Negative for appetite change, chills, fatigue, fever and unexpected weight change.   Gastrointestinal: Negative for abdominal distention, abdominal pain, anal bleeding, blood in stool, constipation, diarrhea, nausea and vomiting.   Genitourinary: Negative for dyspareunia, dysuria, menstrual problem, pelvic pain, vaginal bleeding, vaginal discharge and vaginal pain.       /84   Ht 149.9 cm (59.02\")   Wt 52.9 kg (116 lb 9.6 oz)   BMI 23.54 kg/m²     Physical Exam  Constitutional:       General: She " is not in acute distress.     Appearance: Normal appearance. She is not ill-appearing, toxic-appearing or diaphoretic.   Neurological:      General: No focal deficit present.      Mental Status: She is alert and oriented to person, place, and time. Mental status is at baseline.      Motor: No weakness.      Gait: Gait normal.   Psychiatric:         Mood and Affect: Mood normal.         Behavior: Behavior normal.         Thought Content: Thought content normal.         Judgment: Judgment normal.           Assessment & Plan   Diagnoses and all orders for this visit:    1. Pelvic pain (Primary)    2. Encounter for screening mammogram for malignant neoplasm of breast  -     Mammo Screening Bilateral With CAD; Future      58yo with pelvic pain    1) pelvic pain:  TVUS 11./2022: AV uterus. EM lining 4mm. Fluid in cervical canal measuring 1.1 x .4 x .5mm. Sonolucent area within right ovary measuring 1.5 x 1.4 x 1.1cm.  Normal left ovary. CT scan done 12/22 and revealed stable right ovarian cyst. Pt was sent back to me per GI about the cyst. Discussed with pt that I do not think this cyst that is small and stable is the etiology of her pain. She has seen GI and is going to be scheduled for colonoscopy. CT scan also revealed an enlarging hepatic cyst - was 1.8 and is now 4.8cm. GI did not mention a plan for the cyst. I sent a note to GI asking if there is a plan for the hepatic cyst. If they do not have a plan then will refer pt to hepatologist.     2) gyn HM: LPS 2/2022    3) left breast biopsy 6/2022: Negative. Per Dr Mesa note 11/2022 she is to have screening 3/2023. Will order today.     4)  used.       I spent 30 minutes caring for Savita on this date of service. This time includes time spent by me in the following activities: preparing for the visit, reviewing tests, obtaining and/or reviewing a separately obtained history, counseling and educating the patient/family/caregiver, referring and  communicating with other health care professionals and documenting information in the medical record.       No follow-ups on file.      Alina Piper DO    1/24/2023  10:28 EST

## 2023-01-24 NOTE — PROGRESS NOTES
"PROBLEM VISIT    Chief Complaint: pelvic pain      Savita Lucio is a 57 y.o. patient who presents for follow up of pelvic pain. Pt presents for fu of pelvic pain. Pt was seen in 11/2022 for pelvic pain- specifically in right mid to RLQ. Pt had US performed revealing sonolucent area within right ovary measuring 1.5 x 1.4 x 1.1cm. A CT scan revealed right cyst stability. A hepatic cyst was noted to be enlarging from 1.8 to 4.8cm. Pt was not aware of the hepatic cyst. She saw GI and the note states the hepatic cyst but did not have recommendations. She was told to return to us about the ovarian cyst.     Chief Complaint   Patient presents with   • Abdominal Pain             The following portions of the patient's history were reviewed and updated as appropriate: allergies, current medications and problem list.    Review of Systems   Constitutional: Negative for appetite change, chills, fatigue, fever and unexpected weight change.   Gastrointestinal: Negative for abdominal distention, abdominal pain, anal bleeding, blood in stool, constipation, diarrhea, nausea and vomiting.   Genitourinary: Negative for dyspareunia, dysuria, menstrual problem, pelvic pain, vaginal bleeding, vaginal discharge and vaginal pain.       /84   Ht 149.9 cm (59.02\")   Wt 52.9 kg (116 lb 9.6 oz)   BMI 23.54 kg/m²     Physical Exam  Constitutional:       General: She is not in acute distress.     Appearance: Normal appearance. She is not ill-appearing, toxic-appearing or diaphoretic.   Neurological:      General: No focal deficit present.      Mental Status: She is alert and oriented to person, place, and time. Mental status is at baseline.      Motor: No weakness.      Gait: Gait normal.   Psychiatric:         Mood and Affect: Mood normal.         Behavior: Behavior normal.         Thought Content: Thought content normal.         Judgment: Judgment normal.           Assessment & Plan   Diagnoses and all orders for this visit:    1. " Pelvic pain (Primary)    2. Encounter for screening mammogram for malignant neoplasm of breast  -     Mammo Screening Bilateral With CAD; Future      56yo with pelvic pain    1) pelvic pain:  TVUS 11./2022: AV uterus. EM lining 4mm. Fluid in cervical canal measuring 1.1 x .4 x .5mm. Sonolucent area within right ovary measuring 1.5 x 1.4 x 1.1cm.  Normal left ovary. CT scan done 12/22 and revealed stable right ovarian cyst. Pt was sent back to me per GI about the cyst. Discussed with pt that I do not think this cyst that is small and stable is the etiology of her pain. She has seen GI and is going to be scheduled for colonoscopy. CT scan also revealed an enlarging hepatic cyst - was 1.8 and is now 4.8cm. GI did not mention a plan for the cyst. I sent a note to GI asking if there is a plan for the hepatic cyst. If they do not have a plan then will refer pt to hepatologist.     2) gyn HM: LPS 2/2022    3) left breast biopsy 6/2022: Negative. Per Dr Mesa note 11/2022 she is to have screening 3/2023. Will order today.     4)  used.        I spent 30 minutes caring for Savita on this date of service. This time includes time spent by me in the following activities: preparing for the visit, reviewing tests, obtaining and/or reviewing a separately obtained history, counseling and educating the patient/family/caregiver, referring and communicating with other health care professionals and documenting information in the medical record.       No follow-ups on file.      Alina Piper DO    1/24/2023  10:28 EST

## 2023-01-25 ENCOUNTER — OFFICE VISIT (OUTPATIENT)
Dept: SPORTS MEDICINE | Facility: CLINIC | Age: 58
End: 2023-01-25
Payer: COMMERCIAL

## 2023-01-25 VITALS — WEIGHT: 116 LBS | BODY MASS INDEX: 23.39 KG/M2 | HEIGHT: 59 IN | TEMPERATURE: 98.6 F

## 2023-01-25 DIAGNOSIS — K76.89 HEPATIC CYST: ICD-10-CM

## 2023-01-25 DIAGNOSIS — R10.31 RIGHT LOWER QUADRANT ABDOMINAL PAIN: Primary | ICD-10-CM

## 2023-01-25 PROCEDURE — 99215 OFFICE O/P EST HI 40 MIN: CPT | Performed by: FAMILY MEDICINE

## 2023-01-26 ENCOUNTER — TELEPHONE (OUTPATIENT)
Dept: GASTROENTEROLOGY | Facility: CLINIC | Age: 58
End: 2023-01-26
Payer: COMMERCIAL

## 2023-01-26 NOTE — TELEPHONE ENCOUNTER
Called patient to discuss results and recommendations with patient. Used  services and left voicemail for patient. Dana Goodman APRN Lovett, Emily, JUSTINE Ferreira,     I saw the patient in office most recently.  There was an  present for the visit and I recall that there was some confusion.  For interval increase in liver cysts noted on her CT scan, I will want to proceed with a liver ultrasound for further evaluation.  I will go ahead and place the orders accordingly.  I have communicated this with her gynecologist, Dr. Piper.  Please contact the patient to discuss.  I am not for sure what the exact processes regarding use of an  when calling the patient with results, but we need to make sure that the patient understands that we have noted the interval change in her liver cysts and need to proceed with a liver ultrasound for further evaluation.  We will contact her with results once they return.  Thank you.  Dana MCKINNEY

## 2023-01-26 NOTE — TELEPHONE ENCOUNTER
Using  services spoke to patient regarding results and recommendations. Patient is scheduled for ultrasound 1/31/23. Patient verbalized understanding for results. EL

## 2023-01-31 ENCOUNTER — PREP FOR SURGERY (OUTPATIENT)
Dept: SURGERY | Facility: SURGERY CENTER | Age: 58
End: 2023-01-31
Payer: COMMERCIAL

## 2023-01-31 ENCOUNTER — HOSPITAL ENCOUNTER (OUTPATIENT)
Dept: ULTRASOUND IMAGING | Facility: HOSPITAL | Age: 58
Discharge: HOME | End: 2023-01-31
Admitting: NURSE PRACTITIONER
Payer: COMMERCIAL

## 2023-01-31 DIAGNOSIS — K76.89 LIVER CYST: Primary | ICD-10-CM

## 2023-01-31 DIAGNOSIS — R10.31 RIGHT LOWER QUADRANT ABDOMINAL PAIN: Primary | ICD-10-CM

## 2023-01-31 DIAGNOSIS — R10.30 LOWER ABDOMINAL PAIN: ICD-10-CM

## 2023-01-31 DIAGNOSIS — R10.9 RIGHT SIDED ABDOMINAL PAIN: ICD-10-CM

## 2023-01-31 DIAGNOSIS — R10.31 RIGHT LOWER QUADRANT ABDOMINAL PAIN: ICD-10-CM

## 2023-01-31 DIAGNOSIS — K76.89 LIVER CYST: ICD-10-CM

## 2023-01-31 PROCEDURE — 76705 ECHO EXAM OF ABDOMEN: CPT

## 2023-01-31 RX ORDER — SODIUM CHLORIDE 0.9 % (FLUSH) 0.9 %
3 SYRINGE (ML) INJECTION EVERY 12 HOURS SCHEDULED
Status: CANCELLED | OUTPATIENT
Start: 2023-01-31

## 2023-01-31 RX ORDER — SODIUM CHLORIDE 0.9 % (FLUSH) 0.9 %
10 SYRINGE (ML) INJECTION AS NEEDED
Status: CANCELLED | OUTPATIENT
Start: 2023-01-31

## 2023-01-31 RX ORDER — SODIUM CHLORIDE, SODIUM LACTATE, POTASSIUM CHLORIDE, CALCIUM CHLORIDE 600; 310; 30; 20 MG/100ML; MG/100ML; MG/100ML; MG/100ML
30 INJECTION, SOLUTION INTRAVENOUS CONTINUOUS PRN
Status: CANCELLED | OUTPATIENT
Start: 2023-01-31

## 2023-02-03 ENCOUNTER — TELEPHONE (OUTPATIENT)
Dept: GASTROENTEROLOGY | Facility: CLINIC | Age: 58
End: 2023-02-03
Payer: COMMERCIAL

## 2023-02-03 NOTE — TELEPHONE ENCOUNTER
Using an  called patient and left message to call office back to discuss referral and appointment set up with Dr. Graeme Pete at [facility] general surgery for 2/8/23 at 10:30 am. EL

## 2023-02-03 NOTE — TELEPHONE ENCOUNTER
Using an , patient was called and her appointment with Dr. Graeme Pete was discussed. Patient was given address, day and time and phone number for that office. Patient was encouraged to contact our office with any questions or concerns. Patient verbalized understanding and was agreeable. EL

## 2023-02-21 ENCOUNTER — OFFICE VISIT (OUTPATIENT)
Dept: OBSTETRICS AND GYNECOLOGY | Facility: CLINIC | Age: 58
End: 2023-02-21
Payer: COMMERCIAL

## 2023-02-21 VITALS
HEIGHT: 59 IN | BODY MASS INDEX: 23.51 KG/M2 | SYSTOLIC BLOOD PRESSURE: 128 MMHG | WEIGHT: 116.6 LBS | DIASTOLIC BLOOD PRESSURE: 74 MMHG

## 2023-02-21 DIAGNOSIS — Z11.51 ENCOUNTER FOR SCREENING FOR HUMAN PAPILLOMAVIRUS (HPV): ICD-10-CM

## 2023-02-21 DIAGNOSIS — Z01.419 ROUTINE GYNECOLOGICAL EXAMINATION: Primary | ICD-10-CM

## 2023-02-21 DIAGNOSIS — Z01.419 PAP SMEAR, LOW-RISK: ICD-10-CM

## 2023-02-21 PROCEDURE — 81002 URINALYSIS NONAUTO W/O SCOPE: CPT | Performed by: OBSTETRICS & GYNECOLOGY

## 2023-02-21 PROCEDURE — 99396 PREV VISIT EST AGE 40-64: CPT | Performed by: OBSTETRICS & GYNECOLOGY

## 2023-02-21 NOTE — PROGRESS NOTES
GYN Annual Exam     CC- Here for annual exam.     Savita Lucio is a 57 y.o. female who presents for annual well woman exam. No cycle in over one year. Rare vasomotor sx. Since last visit pt has had an US of the liver (23) revealing hepatic cysts. Her CT scan (12/15/22) revealed a hepatic cyst measuring 4.8cm. Pt has seen general surgery at [place] and she is scheduled for a laparoscopic liver resection on 3/13/23.  Last colonoscopy 2018. Pt is still having pelvic pain. She has not been scheduled for a colonoscopy yet but has seen GI. Pt last saw Dr eMsa 22. She is due for a MMG 3/2023- pt states she was not aware.    OB History        1    Para   1    Term   1       0    AB   0    Living   1       SAB   0    IAB   0    Ectopic   0    Molar        Multiple   0    Live Births   1              Current contraception: none  History of abnormal Pap smear: no  History of abnormal mammogram: 10/2020. Had a biopsy- benign but biopsy was not concordant with imaging and surgical consult recommended.  Family history of uterine, colon or ovarian cancer: no  Family history of breast cancer: no    Health Maintenance   Topic Date Due   • DXA SCAN  Never done   • HEPATITIS C SCREENING  Never done   • ZOSTER VACCINE (1 of 2) Never done   • COVID-19 Vaccine (3 - Booster) 2021   • INFLUENZA VACCINE  Never done   • LIPID PANEL  2022   • ANNUAL PHYSICAL  2023   • MAMMOGRAM  10/26/2023   • Annual Gynecologic Pelvic and Breast Exam  2024   • PAP SMEAR  2026   • COLORECTAL CANCER SCREENING  2028   • TDAP/TD VACCINES (2 - Td or Tdap) 2033   • Pneumococcal Vaccine 0-64  Aged Out       Past Medical History:   Diagnosis Date   • Acid reflux 07/15/2016   • Anemia    • Anxiety    • Breast pain, left    • Breast pain, right     STATES HAS OFF AND ON FOR 20 YEARS   • Hemorrhoid    • History of migraine    • History of Papanicolaou smear of cervix 2015   • History of  tachycardia    • HLD (hyperlipidemia) 07/15/2016   • Hypertension    • Lab test positive for detection of COVID-19 virus            Past Surgical History:   Procedure Laterality Date   • BREAST BIOPSY Left     Benign   • BREAST BIOPSY Left 6/15/2022    Procedure: left breast needle localized excision of radial scar.;  Surgeon: Kerline Mesa MD;  Location: [facility] MAIN OR;  Service: General;  Laterality: Left;   •  SECTION     • COLONOSCOPY     • CYSTECTOMY Left     Below left buttock   • ENDOSCOPY     • HEMORRHOIDECTOMY           Current Outpatient Medications:   •  acetaminophen (TYLENOL) 500 MG tablet, Take 1,000 mg by mouth Every 6 (Six) Hours As Needed for Mild Pain ., Disp: , Rfl:   •  Ascorbic Acid (VITAMIN C PO), Take 2,000 mg by mouth Daily. HOLDING FOR SURGERY, Disp: , Rfl:   •  carvedilol (COREG) 12.5 MG tablet, Take 1 tablet by mouth 2 (Two) Times a Day., Disp: 90 tablet, Rfl: 3  •  chlorthalidone (HYGROTON) 25 MG tablet, Take 1 tablet by mouth Daily., Disp: 90 tablet, Rfl: 3  •  cholecalciferol (VITAMIN D3) 25 MCG (1000 UT) tablet, Take 1,000 Units by mouth Daily. HOLDING FOR SURGERY, Disp: , Rfl:   •  COLLAGEN-VITAMIN C PO, Take 1 capsule by mouth Daily. HOLDING FOR SURGERY, Disp: , Rfl:   •  dicyclomine (BENTYL) 10 MG capsule, Take 1 capsule by mouth 3 (Three) Times a Day As Needed (abdominal cramping, fecal urgency, and diarrhea)., Disp: 90 capsule, Rfl: 1  •  ferrous sulfate 325 (65 FE) MG tablet, Take 325 mg by mouth Daily With Breakfast., Disp: , Rfl:   •  fluticasone (Flonase) 50 MCG/ACT nasal spray, 2 sprays into the nostril(s) as directed by provider Daily. (Patient taking differently: 2 sprays into the nostril(s) as directed by provider As Needed.), Disp: 16 g, Rfl: 11  •  ibuprofen (ADVIL,MOTRIN) 800 MG tablet, Take 1 tablet by mouth Every 8 (Eight) Hours As Needed for Mild Pain., Disp: 21 tablet, Rfl: 0  •  loratadine (CLARITIN) 10 MG tablet, Take 1 tablet by mouth Daily. As  "needed for allergies (Patient taking differently: Take 10 mg by mouth As Needed. As needed for allergies), Disp: 90 tablet, Rfl: 3  •  lovastatin (MEVACOR) 40 MG tablet, Take 1 tablet by mouth Every Night., Disp: 90 tablet, Rfl: 3  •  Multiple Vitamins-Minerals (ZINC PO), Take 50 mg by mouth Daily. HOLDING FOR SURGERY, Disp: , Rfl:   •  nitrofurantoin, macrocrystal-monohydrate, (Macrobid) 100 MG capsule, Take 1 capsule by mouth 2 (Two) Times a Day. For urinary tract infection, Disp: 14 capsule, Rfl: 0  •  VITAMIN K PO, Take 1,000 mcg by mouth Daily. HOLDING FOR SURGERY, Disp: , Rfl:     No Known Allergies    Social History     Tobacco Use   • Smoking status: Never   • Smokeless tobacco: Never   Vaping Use   • Vaping Use: Never used   Substance Use Topics   • Alcohol use: No     Comment: denies caffeine use    • Drug use: No       Family History   Problem Relation Age of Onset   • Heart attack Mother    • Hyperlipidemia Father    • Hyperlipidemia Sister    • No Known Problems Son    • Cervical cancer Cousin    • Malig Hyperthermia Neg Hx    • Colon cancer Neg Hx    • Colon polyps Neg Hx    • Crohn's disease Neg Hx    • Irritable bowel syndrome Neg Hx    • Ulcerative colitis Neg Hx        Review of Systems   Constitutional: Negative for appetite change, chills, fatigue, fever and unexpected weight change.   Gastrointestinal: Negative for abdominal distention, abdominal pain, anal bleeding, blood in stool, constipation, diarrhea, nausea and vomiting.   Genitourinary: Negative for dyspareunia, dysuria, menstrual problem, pelvic pain, vaginal bleeding, vaginal discharge and vaginal pain.       /74   Ht 149.9 cm (59.02\")   Wt 52.9 kg (116 lb 9.6 oz)   LMP  (Exact Date)   BMI 23.54 kg/m²     Physical Exam  Vitals reviewed.   Constitutional:       General: She is not in acute distress.     Appearance: Normal appearance. She is well-developed. She is not ill-appearing, toxic-appearing or diaphoretic.   HENT:      " Mouth/Throat:      Dentition: Normal dentition. No dental caries.   Cardiovascular:      Rate and Rhythm: Normal rate and regular rhythm.      Heart sounds: Normal heart sounds.   Pulmonary:      Effort: Pulmonary effort is normal. No respiratory distress.      Breath sounds: Normal breath sounds. No stridor. No wheezing.   Chest:   Breasts:     Right: No inverted nipple, mass, nipple discharge, skin change or tenderness.      Left: No inverted nipple, mass, nipple discharge, skin change or tenderness.   Abdominal:      General: There is no distension.      Palpations: Abdomen is soft. There is no mass.      Tenderness: There is no abdominal tenderness.   Genitourinary:     General: Normal vulva.      Labia:         Right: No rash, tenderness or lesion.         Left: No rash, tenderness or lesion.       Urethra: No prolapse, urethral pain, urethral swelling or urethral lesion.      Vagina: No vaginal discharge, tenderness or bleeding.      Cervix: No cervical motion tenderness, discharge or friability.      Uterus: Not deviated, not enlarged, not fixed and not tender.       Adnexa:         Right: No mass, tenderness or fullness.          Left: No mass, tenderness or fullness.        Rectum: No tenderness or external hemorrhoid.   Musculoskeletal:         General: No tenderness. Normal range of motion.   Skin:     General: Skin is warm.      Coloration: Skin is not pale.      Findings: No erythema or rash.   Neurological:      General: No focal deficit present.      Mental Status: She is alert and oriented to person, place, and time. Mental status is at baseline.      Cranial Nerves: No cranial nerve deficit.      Motor: No weakness.      Coordination: Coordination normal.      Gait: Gait normal.   Psychiatric:         Mood and Affect: Mood normal.         Behavior: Behavior normal.         Thought Content: Thought content normal.         Judgment: Judgment normal.            Assessment/Plan      1) GYN HM: Check pap  smear. Last MMG 6/2022 SBE demonstrated and encouraged. Colonoscopy 2018- getting scheduled for another one.  2) Hx Bilateral mastodynia and abnormal MMG/biopsy: pt had a biopsy with Dr Mesa in 6/2020. She was supposed to follow up for an additional biopsy/excision of radial scar. That did not happen until 4/20/22. She was instructed to get her next MMG 3/7/23. Pt was unaware about needing another MMG at this time. I ordered the MMg and instructed her to get this scheduled.  3) Bone health - Weight bearing exercise, dietary calcium recommendations and vitamin D reviewed.   4) Diet and Exercise discussed  5) Smoking Status: nonsmoker  6) : no cycle in over one year. Some vasomotor sx at night.  7) pelvic pain:  TVUS 11./2022: AV uterus. EM lining 4mm. Fluid in cervical canal measuring 1.1 x .4 x .5mm. Sonolucent area within right ovary measuring 1.5 x 1.4 x 1.1cm. Normal left ovary. CT scan done 12/22 and revealed stable right ovarian cyst. Pt was sent back to me per GI about the cyst. Discussed with pt at last visit that I do not think this cyst that is small and stable is the etiology of her pain. She has seen GI but is not scheduled for colonoscopy yet. CT scan also revealed an enlarging hepatic cyst - was 1.8 and is now 4.8cm. She had a liver US done 1/2023 noting benign liver cysts with no size mentioned. Pt saw a general surgeon last week: scheduled for surgery on March 13th for a laparoscopic liver resection.  8) Swedish speaking only: this visit was done entirely with a . All questions answered.  9) Follow up prn and 1 year       Diagnoses and all orders for this visit:    Routine gynecological examination  -     POC Urinalysis Dipstick    Pap smear, low-risk  -     IGP, Apt HPV,rfx 16 / 18,45    Encounter for screening for human papillomavirus (HPV)  -     IGP, Apt HPV,rfx 16 / 18,45        Alina Piper DO  3/9/2023  13:28 EST

## 2023-03-09 ENCOUNTER — TRANSCRIBE ORDERS (OUTPATIENT)
Dept: ADMINISTRATIVE | Facility: HOSPITAL | Age: 58
End: 2023-03-09
Payer: COMMERCIAL

## 2023-03-09 DIAGNOSIS — Z12.31 VISIT FOR SCREENING MAMMOGRAM: Primary | ICD-10-CM

## 2023-03-22 ENCOUNTER — OFFICE VISIT (OUTPATIENT)
Dept: SPORTS MEDICINE | Facility: CLINIC | Age: 58
End: 2023-03-22
Payer: COMMERCIAL

## 2023-03-22 VITALS
HEIGHT: 59 IN | RESPIRATION RATE: 16 BRPM | TEMPERATURE: 98.6 F | WEIGHT: 116 LBS | SYSTOLIC BLOOD PRESSURE: 118 MMHG | DIASTOLIC BLOOD PRESSURE: 82 MMHG | BODY MASS INDEX: 23.39 KG/M2 | OXYGEN SATURATION: 99 % | HEART RATE: 95 BPM

## 2023-03-22 DIAGNOSIS — J01.00 ACUTE NON-RECURRENT MAXILLARY SINUSITIS: ICD-10-CM

## 2023-03-22 DIAGNOSIS — J30.1 SEASONAL ALLERGIC RHINITIS DUE TO POLLEN: ICD-10-CM

## 2023-03-22 DIAGNOSIS — R10.31 RIGHT LOWER QUADRANT ABDOMINAL PAIN: ICD-10-CM

## 2023-03-22 DIAGNOSIS — K76.89 HEPATIC CYST: Primary | ICD-10-CM

## 2023-03-22 RX ORDER — FLUTICASONE PROPIONATE 50 MCG
2 SPRAY, SUSPENSION (ML) NASAL DAILY
Qty: 16 G | Refills: 11 | Status: SHIPPED | OUTPATIENT
Start: 2023-03-22

## 2023-03-22 RX ORDER — AMOXICILLIN AND CLAVULANATE POTASSIUM 875; 125 MG/1; MG/1
1 TABLET, FILM COATED ORAL 2 TIMES DAILY
Qty: 20 TABLET | Refills: 0 | Status: SHIPPED | OUTPATIENT
Start: 2023-03-22

## 2023-03-22 RX ORDER — LORATADINE 10 MG/1
10 TABLET ORAL DAILY
Qty: 90 TABLET | Refills: 3 | Status: SHIPPED | OUTPATIENT
Start: 2023-03-22

## 2023-03-22 NOTE — PROGRESS NOTES
"Savita is a 57 y.o. year old female    Chief Complaint   Patient presents with   • med check      Pt would like to discuss medications to help with cough and congestion      *Visit performed with  present in the room    History of Present Illness   HPI   Here today to follow-up on chronic abdominal complaints as well as recent COVID infection.  She recently saw Dr. Graeme Pete for her hepatic cyst.  Notes are not available for review, but she states that he told her he did not think that hepatic cyst was the source of her pain but that it should be resected because of its growing size.  She was scheduled to have this performed on 3/13/2023 but had to reschedule because of COVID infection.  Regarding her COVID infection, she initially had chills and extensive congestion but some of those symptoms improved yet she still has persistent congestion and sinus pressure.  Continues to have right lower quadrant abdominal pain worse with eating but relatively constant.  She continues to lose weight because of her pain.    Review of Systems    /82 (BP Location: Left arm, Patient Position: Sitting, Cuff Size: Adult)   Pulse 95   Temp 98.6 °F (37 °C)   Resp 16   Ht 149.9 cm (59.02\")   Wt 52.6 kg (116 lb)   SpO2 99%   BMI 23.41 kg/m²          Physical Exam  Vitals reviewed.   Constitutional:       General: She is not in acute distress.     Appearance: Normal appearance. She is not ill-appearing or diaphoretic.   HENT:      Right Ear: Tympanic membrane normal.      Left Ear: Tympanic membrane normal.      Nose:      Right Sinus: Maxillary sinus tenderness present.      Left Sinus: Maxillary sinus tenderness present.      Mouth/Throat:      Mouth: Mucous membranes are moist.      Pharynx: Oropharynx is clear. No oropharyngeal exudate.   Eyes:      Conjunctiva/sclera: Conjunctivae normal.      Pupils: Pupils are equal, round, and reactive to light.   Cardiovascular:      Rate and Rhythm: Normal rate and " regular rhythm.   Pulmonary:      Effort: Pulmonary effort is normal.      Breath sounds: Normal breath sounds.   Neurological:      Mental Status: She is alert.           Current Outpatient Medications:   •  acetaminophen (TYLENOL) 500 MG tablet, Take 2 tablets by mouth Every 6 (Six) Hours As Needed for Mild Pain., Disp: , Rfl:   •  Ascorbic Acid (VITAMIN C PO), Take 2,000 mg by mouth Daily. HOLDING FOR SURGERY, Disp: , Rfl:   •  carvedilol (COREG) 12.5 MG tablet, Take 1 tablet by mouth 2 (Two) Times a Day., Disp: 90 tablet, Rfl: 3  •  chlorthalidone (HYGROTON) 25 MG tablet, Take 1 tablet by mouth Daily., Disp: 90 tablet, Rfl: 3  •  cholecalciferol (VITAMIN D3) 25 MCG (1000 UT) tablet, Take 1 tablet by mouth Daily. HOLDING FOR SURGERY, Disp: , Rfl:   •  COLLAGEN-VITAMIN C PO, Take 1 capsule by mouth Daily. HOLDING FOR SURGERY, Disp: , Rfl:   •  dicyclomine (BENTYL) 10 MG capsule, Take 1 capsule by mouth 3 (Three) Times a Day As Needed (abdominal cramping, fecal urgency, and diarrhea)., Disp: 90 capsule, Rfl: 1  •  ferrous sulfate 325 (65 FE) MG tablet, Take 1 tablet by mouth Daily With Breakfast., Disp: , Rfl:   •  fluticasone (Flonase) 50 MCG/ACT nasal spray, 2 sprays into the nostril(s) as directed by provider Daily., Disp: 16 g, Rfl: 11  •  ibuprofen (ADVIL,MOTRIN) 800 MG tablet, Take 1 tablet by mouth Every 8 (Eight) Hours As Needed for Mild Pain., Disp: 21 tablet, Rfl: 0  •  loratadine (CLARITIN) 10 MG tablet, Take 1 tablet by mouth Daily. As needed for allergies, Disp: 90 tablet, Rfl: 3  •  lovastatin (MEVACOR) 40 MG tablet, Take 1 tablet by mouth Every Night., Disp: 90 tablet, Rfl: 3  •  Multiple Vitamins-Minerals (ZINC PO), Take 50 mg by mouth Daily. HOLDING FOR SURGERY, Disp: , Rfl:   •  nitrofurantoin, macrocrystal-monohydrate, (Macrobid) 100 MG capsule, Take 1 capsule by mouth 2 (Two) Times a Day. For urinary tract infection, Disp: 14 capsule, Rfl: 0  •  VITAMIN K PO, Take 1,000 mcg by mouth Daily.  HOLDING FOR SURGERY, Disp: , Rfl:   •  amoxicillin-clavulanate (Augmentin) 875-125 MG per tablet, Take 1 tablet by mouth 2 (Two) Times a Day., Disp: 20 tablet, Rfl: 0     Diagnoses and all orders for this visit:    Hepatic cyst    Seasonal allergic rhinitis due to pollen  -     fluticasone (Flonase) 50 MCG/ACT nasal spray; 2 sprays into the nostril(s) as directed by provider Daily.  -     loratadine (CLARITIN) 10 MG tablet; Take 1 tablet by mouth Daily. As needed for allergies    Acute non-recurrent maxillary sinusitis  -     amoxicillin-clavulanate (Augmentin) 875-125 MG per tablet; Take 1 tablet by mouth 2 (Two) Times a Day.    Right lower quadrant abdominal pain       She will reschedule with surgery for the resection of her hepatic cyst.  Regarding her COVID and URI symptoms I think she has a superimposed bacterial sinus infection.  We will treat with Augmentin today.  Continue long-term allergy management.  Recommend continued follow-up with gastroenterology for the right lower quadrant abdominal pain, perhaps she could have a colonoscopy before hepatic resection to expedite this process.  I will reach out to gastroenterology personally.    Total time: 45 minutes. This includes time spent with the patient, but also time spent before the visit reviewing the chart and time after the visit documenting the visit, reviewing labs, imaging studies, etc.      EMR Dragon/Transcription disclaimer:    Much of this encounter note is an electronic transcription/translation of spoken language to printed text.  The electronic translation of spoken language may permit erroneous, or at times, nonsensical words or phrases to be inadvertently transcribed.  Although I have reviewed the note for such errors some may still exist.

## 2023-03-23 ENCOUNTER — PREP FOR SURGERY (OUTPATIENT)
Dept: SURGERY | Facility: SURGERY CENTER | Age: 58
End: 2023-03-23
Payer: COMMERCIAL

## 2023-03-23 DIAGNOSIS — Z12.11 COLON CANCER SCREENING: ICD-10-CM

## 2023-03-23 DIAGNOSIS — R10.31 RIGHT LOWER QUADRANT ABDOMINAL PAIN: Primary | ICD-10-CM

## 2023-03-23 RX ORDER — SODIUM CHLORIDE, SODIUM LACTATE, POTASSIUM CHLORIDE, CALCIUM CHLORIDE 600; 310; 30; 20 MG/100ML; MG/100ML; MG/100ML; MG/100ML
30 INJECTION, SOLUTION INTRAVENOUS CONTINUOUS PRN
OUTPATIENT
Start: 2023-03-23

## 2023-03-23 RX ORDER — SODIUM CHLORIDE 0.9 % (FLUSH) 0.9 %
3 SYRINGE (ML) INJECTION EVERY 12 HOURS SCHEDULED
OUTPATIENT
Start: 2023-03-23

## 2023-03-23 RX ORDER — SODIUM CHLORIDE 0.9 % (FLUSH) 0.9 %
10 SYRINGE (ML) INJECTION AS NEEDED
OUTPATIENT
Start: 2023-03-23

## 2023-03-27 ENCOUNTER — TELEPHONE (OUTPATIENT)
Dept: GASTROENTEROLOGY | Facility: CLINIC | Age: 58
End: 2023-03-27
Payer: COMMERCIAL

## 2023-03-27 NOTE — TELEPHONE ENCOUNTER
RN called patient to discuss getting colonoscopy scheduled prior to her liver resection. Left voicemail for patient, with direct #. Dana Goodman APRN Lovett, Emily, RN  I replied back to Dr. Crum's message.  I do think it is a good idea for us to go ahead and get her colonoscopy prior to her liver resection.  I have placed orders accordingly.  Could you please reach out to the patient to discuss?  Once you discussed with the patient, let me know and then I will send a message over to the scheduling department to see if we can get her scheduled before her liver resection.  You will need to have a  on the phone when you call the patient.  Thanks.  Dana MCKINNEY           Previous Messages       ----- Message -----   From: Diego Crum MD   Sent: 3/22/2023   5:07 PM EDT   To: FAYE Weston     Thank you for your help with Mrs. Lucio.  I saw her today for follow-up.  She was scheduled to have a partial liver resection by Dr. Pete at [facility] although she says that he told her he did not think it was the source of her pain (I cannot find any notes from him yet).  Her surgery was postponed because she had covid infection.   Do you think it would be reasonable to expedite her colonoscopy prior to liver resection as her symptoms seem more lower GI compared to stemming from this hepatic cyst?  With my discussion with her today, she seemed simply concerned that she would have to wait weeks after her surgery to have a colonoscopy which would ultimately address the original problem she was having.     Thank you!   MP

## 2023-03-27 NOTE — TELEPHONE ENCOUNTER
RN called and spoke to patient using  services. RN discussed recommendations to have colonoscopy prior to liver resection. Pt is agreeable to having colonoscopy first. Please put in orders. Thank you,. EL

## 2023-03-28 ENCOUNTER — PREP FOR SURGERY (OUTPATIENT)
Dept: SURGERY | Facility: SURGERY CENTER | Age: 58
End: 2023-03-28
Payer: COMMERCIAL

## 2023-03-28 DIAGNOSIS — R10.31 RIGHT LOWER QUADRANT ABDOMINAL PAIN: ICD-10-CM

## 2023-03-28 DIAGNOSIS — Z12.11 COLON CANCER SCREENING: Primary | ICD-10-CM

## 2023-03-30 ENCOUNTER — TELEPHONE (OUTPATIENT)
Dept: GASTROENTEROLOGY | Facility: CLINIC | Age: 58
End: 2023-03-30
Payer: COMMERCIAL

## 2023-04-14 ENCOUNTER — OUTSIDE FACILITY SERVICE (OUTPATIENT)
Dept: GASTROENTEROLOGY | Facility: CLINIC | Age: 58
End: 2023-04-14
Payer: COMMERCIAL

## 2023-04-14 PROCEDURE — 45378 DIAGNOSTIC COLONOSCOPY: CPT | Performed by: INTERNAL MEDICINE

## 2023-04-19 ENCOUNTER — TELEPHONE (OUTPATIENT)
Dept: GASTROENTEROLOGY | Facility: CLINIC | Age: 58
End: 2023-04-19
Payer: COMMERCIAL

## 2023-04-19 NOTE — TELEPHONE ENCOUNTER
Pt called and scheduled follow up appointment and discussed wanting to have an H.pylori breath test done. RN encouraged patient to discuss with provider during appointment. Pt verbalized understanding. EL

## 2023-04-25 DIAGNOSIS — I10 BENIGN ESSENTIAL HTN: ICD-10-CM

## 2023-04-25 RX ORDER — CARVEDILOL 12.5 MG/1
12.5 TABLET ORAL 2 TIMES DAILY
Qty: 90 TABLET | Refills: 3 | Status: SHIPPED | OUTPATIENT
Start: 2023-04-25

## 2023-04-26 ENCOUNTER — LAB (OUTPATIENT)
Dept: LAB | Facility: HOSPITAL | Age: 58
End: 2023-04-26
Payer: COMMERCIAL

## 2023-04-26 ENCOUNTER — OFFICE VISIT (OUTPATIENT)
Dept: GASTROENTEROLOGY | Facility: CLINIC | Age: 58
End: 2023-04-26
Payer: COMMERCIAL

## 2023-04-26 VITALS
HEART RATE: 79 BPM | DIASTOLIC BLOOD PRESSURE: 70 MMHG | WEIGHT: 112.5 LBS | HEIGHT: 59 IN | OXYGEN SATURATION: 99 % | TEMPERATURE: 96.5 F | BODY MASS INDEX: 22.68 KG/M2 | SYSTOLIC BLOOD PRESSURE: 110 MMHG

## 2023-04-26 DIAGNOSIS — R10.84 GENERALIZED ABDOMINAL PAIN: ICD-10-CM

## 2023-04-26 DIAGNOSIS — R10.9 RIGHT SIDED ABDOMINAL PAIN: ICD-10-CM

## 2023-04-26 DIAGNOSIS — R10.30 LOWER ABDOMINAL PAIN: Chronic | ICD-10-CM

## 2023-04-26 DIAGNOSIS — K76.89 LIVER CYST: Chronic | ICD-10-CM

## 2023-04-26 DIAGNOSIS — Z86.19 HISTORY OF HELICOBACTER PYLORI INFECTION: ICD-10-CM

## 2023-04-26 DIAGNOSIS — Z90.49 HISTORY OF RESECTION OF LIVER: Primary | ICD-10-CM

## 2023-04-26 DIAGNOSIS — Z12.11 COLON CANCER SCREENING: ICD-10-CM

## 2023-04-26 PROCEDURE — 83013 H PYLORI (C-13) BREATH: CPT | Performed by: NURSE PRACTITIONER

## 2023-04-26 RX ORDER — DICYCLOMINE HYDROCHLORIDE 10 MG/1
10 CAPSULE ORAL 3 TIMES DAILY PRN
Qty: 90 CAPSULE | Refills: 2 | Status: SHIPPED | OUTPATIENT
Start: 2023-04-26

## 2023-04-26 NOTE — PATIENT INSTRUCTIONS
We will order H. Pylori breath testing to assess for H. Pylori due to your history.     2.   You will receive a phone call to schedule your HIDA scan for further evaluation of the gallbladder.     3.  Please contact Dr. Pete for follow up of enlarged liver cyst.     4.  Additional recommendation pending outcome of above testing. Plan for follow up in 3 months or sooner should symptoms worsen or fail to improve.    5.  We recommend that you continue to use dicyclomine for abdominal pain, as it responds well and you may be having colonic spasms.

## 2023-04-26 NOTE — PROGRESS NOTES
Chief Complaint   Patient presents with   • Abdominal Pain         History of Present Illness  57-year-old female presents the office today for follow-up.   Michaela Farley was present today in person to help facilitate communication.  She was last seen in office on 1/11/2023.  She has a history of hepatic cyst, renal densities, and right adnexal cyst.  These findings were noted on CT scan on 12/15/2022.  At her last office visit she was reporting abdominal pain in her lower abdomen that been present for about 4 months.  Meat seemed to trigger symptoms.  Dicyclomine had worked well to manage symptoms.     APRN discussed case with the patient's gynecologist several times and the patient wanted to have her colonoscopy prior to her liver resection to ensure that there was no disease or problems in her colon. She underwent colonoscopy on 4/14/2023 which was unremarkable and she was recommended to undergo her next screening colonoscopy at a 10-year interval which will be due April 2023.    She was seen and evaluated with Dr. Pete and there were plans for liver resection secondary to large liver cyst that increased in size. Surgery was scheduled for March 13, 2023 but due to her trent COVID it was cancelled. She demonstrated an interval growth in her liver cyst by 3 cm when compared to imaging on 7/22/2016. Significant communication took place between FAYE and the patient's gynecologist and it was not felt the the abdominal pain was secondary to her adnexal cyst, rather it could be secondary to her enlarged hepatic cyst.  The patient does not have an upcoming follow-up with Dr. Pete per her report.     Pain in her right upper quadrant worsens after eating and occurs daily. When she eats she feels that she has a knot in her RLQ which radiates to her back. She has her gallbladder and has not undergone w/u for possible gallbladder etiology. When she has a BM her pain will go away. She reports having a BM  "1-2 times per day consisting of normal stool. She denies any rectal bleeding.     Patient contacted the office last week inquiring about having an H. pylori breath test performed. She has had H. Pylori in the past twice and would like to have her test repeated to see if this is a possible cause for her symptoms.     Review of Systems   Constitutional: Negative for fever and unexpected weight change.   HENT: Negative for trouble swallowing.    Cardiovascular: Negative for chest pain.   Gastrointestinal: Positive for abdominal pain. Negative for abdominal distention, anal bleeding, blood in stool, constipation, diarrhea, nausea, rectal pain and vomiting.      Result Review :       Office Visit with Dana Cuevas APRN (01/11/2023)  SCANNED - COLONOSCOPY (04/14/2023)  US Liver (01/31/2023 10:52)  CT Abdomen Pelvis With Contrast (12/15/2022 16:41)    Vital Signs:   /70   Pulse 79   Temp 96.5 °F (35.8 °C)   Ht 149.9 cm (59.02\")   Wt 51 kg (112 lb 8 oz)   SpO2 99%   BMI 22.71 kg/m²     Body mass index is 22.71 kg/m².     Physical Exam  Vitals reviewed.   Constitutional:       Appearance: Normal appearance.   Pulmonary:      Effort: Pulmonary effort is normal. No respiratory distress.   Abdominal:      General: Abdomen is flat. Bowel sounds are normal.      Palpations: Abdomen is soft.   Musculoskeletal:         General: Normal range of motion.   Skin:     General: Skin is warm and dry.   Neurological:      General: No focal deficit present.      Mental Status: She is alert and oriented to person, place, and time.   Psychiatric:         Mood and Affect: Mood normal.         Behavior: Behavior normal.         Thought Content: Thought content normal.         Judgment: Judgment normal.       Assessment and Plan    Diagnoses and all orders for this visit:    1. History of resection of liver (Primary)    2. Liver cyst    3. Colon cancer screening    4. Lower abdominal pain    5. History of Helicobacter pylori " infection  -     H. Pylori Breath Test - Breath, Lung    6. Generalized abdominal pain  -     H. Pylori Breath Test - Breath, Lung    7. Right sided abdominal pain  -     NM HIDA SCAN WITH PHARMACOLOGICAL INTERVENTION; Future    Other orders  -     dicyclomine (BENTYL) 10 MG capsule; Take 1 capsule by mouth 3 (Three) Times a Day As Needed (abdominal cramping, fecal urgency, and diarrhea).  Dispense: 90 capsule; Refill: 2          Patient Instructions   1.  We will order H. Pylori breath testing to assess for H. Pylori due to your history.     2.   You will receive a phone call to schedule your HIDA scan for further evaluation of the gallbladder.     3.  Please contact Dr. Pete for follow up of enlarged liver cyst.     4.  Additional recommendation pending outcome of above testing. Plan for follow up in 3 months or sooner should symptoms worsen or fail to improve.    5.  We recommend that you continue to use dicyclomine for abdominal pain, as it responds well and you may be having colonic spasms.      Discussion:    Colonoscopy findings were reviewed with patient today during her office visit.   Next colonoscopy due in 10 years for screening purposes.  Patient was originally scheduled to have a wedge resection of her liver for removal of her enlarging hepatic cyst, although this was not performed secondary to her trent COVID.  She does not have a follow-up appointment scheduled with Dr. Pete.  Significant communication took place between APRN and patient's gynecologist regarding the patient's pain, which was initially thought to be caused from a right adnexal cyst.  The pain seems to be down lower in her abdomen on the right side.  Dicyclomine has been working well to manage the discomfort, but the patient had not been taking it on a regular basis.  APRN stressed the importance of follow-up with Dr. Pete due to the 3 cm change in size of her hepatic cyst.    We will assess for H. pylori due to the patient's  history and per her request for possible etiology of some of her symptoms.  We will also order a HIDA scan for further evaluation of the gallbladder.  We have recommended she continue to use dicyclomine for possible intestinal spasms, and we will plan for office follow-up in 3 months for reassessment of symptoms, or sooner should her symptoms worsen or fail to improve.  Patient verbalized understanding of above plan of care and is in agreement.  All questions answered and support provided.    EMR Dragon/Transcription Disclaimer:  This document has been Dictated utilizing Dragon dictation.

## 2023-04-27 ENCOUNTER — HOSPITAL ENCOUNTER (OUTPATIENT)
Dept: MAMMOGRAPHY | Facility: HOSPITAL | Age: 58
Discharge: HOME | End: 2023-04-27
Admitting: OBSTETRICS & GYNECOLOGY
Payer: COMMERCIAL

## 2023-04-27 DIAGNOSIS — Z12.31 VISIT FOR SCREENING MAMMOGRAM: ICD-10-CM

## 2023-04-27 LAB — UREA BREATH TEST QL: NEGATIVE

## 2023-04-27 PROCEDURE — 77067 SCR MAMMO BI INCL CAD: CPT

## 2023-04-27 PROCEDURE — 77063 BREAST TOMOSYNTHESIS BI: CPT

## 2023-05-09 ENCOUNTER — HOSPITAL ENCOUNTER (OUTPATIENT)
Dept: NUCLEAR MEDICINE | Facility: HOSPITAL | Age: 58
Discharge: HOME | End: 2023-05-09
Payer: COMMERCIAL

## 2023-05-09 DIAGNOSIS — R10.9 RIGHT SIDED ABDOMINAL PAIN: ICD-10-CM

## 2023-05-09 PROCEDURE — 78227 HEPATOBIL SYST IMAGE W/DRUG: CPT

## 2023-05-09 PROCEDURE — 25010000002 SINCALIDE PER 5 MCG: Performed by: NURSE PRACTITIONER

## 2023-05-09 PROCEDURE — 0 TECHNETIUM TC 99M MEBROFENIN KIT: Performed by: NURSE PRACTITIONER

## 2023-05-09 PROCEDURE — A9537 TC99M MEBROFENIN: HCPCS | Performed by: NURSE PRACTITIONER

## 2023-05-09 RX ORDER — KIT FOR THE PREPARATION OF TECHNETIUM TC 99M MEBROFENIN 45 MG/10ML
1 INJECTION, POWDER, LYOPHILIZED, FOR SOLUTION INTRAVENOUS
Status: COMPLETED | OUTPATIENT
Start: 2023-05-09 | End: 2023-05-09

## 2023-05-09 RX ADMIN — SODIUM CHLORIDE 1 MCG: 9 INJECTION, SOLUTION INTRAVENOUS at 09:53

## 2023-05-09 RX ADMIN — MEBROFENIN 1 DOSE: 45 INJECTION, POWDER, LYOPHILIZED, FOR SOLUTION INTRAVENOUS at 08:48

## 2023-07-25 ENCOUNTER — OFFICE VISIT (OUTPATIENT)
Dept: GASTROENTEROLOGY | Facility: CLINIC | Age: 58
End: 2023-07-25
Payer: COMMERCIAL

## 2023-07-25 VITALS
OXYGEN SATURATION: 97 % | TEMPERATURE: 96.1 F | HEART RATE: 80 BPM | BODY MASS INDEX: 23.18 KG/M2 | DIASTOLIC BLOOD PRESSURE: 80 MMHG | SYSTOLIC BLOOD PRESSURE: 120 MMHG | HEIGHT: 59 IN | WEIGHT: 115 LBS

## 2023-07-25 DIAGNOSIS — R10.11 RIGHT UPPER QUADRANT ABDOMINAL PAIN: Chronic | ICD-10-CM

## 2023-07-25 DIAGNOSIS — K59.00 CONSTIPATION, UNSPECIFIED CONSTIPATION TYPE: Chronic | ICD-10-CM

## 2023-07-25 DIAGNOSIS — K76.89 LIVER CYST: Primary | Chronic | ICD-10-CM

## 2023-07-25 PROCEDURE — 99214 OFFICE O/P EST MOD 30 MIN: CPT | Performed by: NURSE PRACTITIONER

## 2023-07-25 NOTE — PATIENT INSTRUCTIONS
For constipation we recommend a daily fiber such as Citrucel, Benefiber, or Metamucil. We recommend starting with one serving per day and adjusting based upon how your bowel habits respond.     2.  We will contact Dr. Pete's office to schedule your appointment to further discuss your liver cyst.     3.  Office follow up to be determined based upon recommendations from Dr. Pete and response to above regarding constipation.     4.  For what appears to be a mild lactose sensitivity, we would recommend use of almond milk or use of Lactaid supplement which helps to provide the necessary enzyme to help break down the lactose if drinking regular cow's milk.

## 2023-07-25 NOTE — PROGRESS NOTES
Chief Complaint   Patient presents with    Follow-up     Liver cyst, abdominal pain         History of Present Illness  58-year-old female presents the office today for follow-up.  She was last seen in office on 4/26/2023.   Michaela Farley is present today during the patient's follow-up visit to help facilitate communication.  She has a history of a hepatic cyst, renal densities, right adnexal cyst, and abdominal pain.    Most recent colonoscopy was performed on 4/14/2023 which was unremarkable.  Next screening colonoscopy due to 10-year interval, April 2033.  She was seen and evaluated by Dr. Pete and previously there were plans to resect her liver due to her large liver cyst that had increased in size on imaging.  Originally surgery was scheduled for 3/13/2023 but due to her trent COVID it was canceled.  There was an interval increase in growth of her liver cyst when compared to imaging on 7/22/2016.  APRN did discuss with the patient's gynecologist these findings and it was not felt at that time that the abdominal pain was secondary to her cyst and rather could have been secondary to her enlarged hepatic cyst.  The patient states that she does not have a follow-up appointment with Dr. Pete as of her last office visit.    She reports RUQ pain but is unsure if it is due to her liver. She has not yet scheduled a follow appointment with Dr. Pete. She reports anxiety and tension related to her liver cyst.     She denies any upper GI symptoms such as heartburn, reflux, nausea, vomiting, or dysphagia.     She reports that she is mildly constipated. She denies any rectal bleeding. She has not tried any medication, as she feels that her constipation is directly related to her diet. Meat constipates her as well as fast food. She does not take a fiber supplement.     HIDA scan was performed on 5/9/2023 due to reports of right upper quadrant abdominal pain postprandially.  Gallbladder EF was noted to be  "normal at 71%.  H. pylori breath test performed on 4/26/2023 was normal.       Result Review :       Office Visit with Dana Cuevas APRN (04/26/2023)   SCANNED - COLONOSCOPY (04/14/2023)   NM HIDA SCAN WITH PHARMACOLOGICAL INTERVENTION (05/09/2023 10:41)   US Liver (01/31/2023 10:52)   H. Pylori Breath Test - Breath, Lung (04/26/2023 10:06)   Vital Signs:   /80   Pulse 80   Temp 96.1 °F (35.6 °C)   Ht 149.9 cm (59.02\")   Wt 52.2 kg (115 lb)   SpO2 97%   BMI 23.21 kg/m²     Body mass index is 23.21 kg/m².     Physical Exam  Vitals reviewed.   Constitutional:       Appearance: Normal appearance.   Pulmonary:      Effort: Pulmonary effort is normal. No respiratory distress.   Abdominal:      General: Abdomen is flat. Bowel sounds are normal. There is no distension.      Palpations: Abdomen is soft. There is no mass.      Tenderness: There is no abdominal tenderness. There is no guarding.   Musculoskeletal:         General: Normal range of motion.   Skin:     General: Skin is warm and dry.   Neurological:      General: No focal deficit present.      Mental Status: She is alert and oriented to person, place, and time.   Psychiatric:         Mood and Affect: Mood normal.         Behavior: Behavior normal.         Thought Content: Thought content normal.         Judgment: Judgment normal.         Assessment and Plan    Diagnoses and all orders for this visit:    1. Liver cyst (Primary)    2. Right upper quadrant abdominal pain    3. Constipation, unspecified constipation type           Patient Instructions   For constipation we recommend a daily fiber such as Citrucel, Benefiber, or Metamucil. We recommend starting with one serving per day and adjusting based upon how your bowel habits respond.     2.  We will contact Dr. Pete's office to schedule your appointment to further discuss your liver cyst.     3.  Office follow up to be determined based upon recommendations from Dr. Pete and response to above " regarding constipation.     4.  For what appears to be a mild lactose sensitivity, we would recommend use of almond milk or use of Lactaid supplement which helps to provide the necessary enzyme to help break down the lactose if drinking regular cow's milk.       Discussion:    Patient has not made follow-up appointment with Dr. Pete regarding her liver cyst but she does continue to experience right-sided abdominal pain.  He was previously scheduled for a liver resection secondary to increased interval size of her liver cyst, but this was canceled due to COVID.  No follow-up appointment was made per patient.  We will contact the office today and make efforts towards getting the patient scheduled for a follow-up appointment.  APRN was made aware by  that voicemail had to be left for Dr. Pete.    For constipation, patient was recommended to start a fiber supplement.  Due to reports of lactose sensitivity when consuming milk, APRN recommended use of Lactaid or use of almond milk.  Next follow-up visit to be determined based upon recommendations per Dr. Pete at follow-up visit.  Patient verbalized understand above plan of care and is in agreement.  All questions answered and support provided.      EMR Dragon/Transcription Disclaimer:  This document has been Dictated utilizing Dragon dictation.

## 2023-08-10 NOTE — TELEPHONE ENCOUNTER
"LABOR NOTE    S:  MD to bedside for routine cervical exam. Epidural working:  yes    O: /70   Pulse 68   Temp 98 °F (36.7 °C) (Oral)   Resp 18   Ht 5' 2" (1.575 m)   Wt 69.6 kg (153 lb 7 oz)   LMP 11/07/2022 (Exact Date)   SpO2 98%   Breastfeeding No   BMI 28.06 kg/m²     FHT: 125 bpm, moderate variability, +accels, -decels, Cat 1 (reassuring)  CTX: q 3 minutes    TIMELINE:  0115: 1/50/-3, hopkins placed   0515: 3/70/-3, hopkins out  1015: 5/70/-2  1700: 6/80/-1  1830: 6/80/-1    PLAN:    Continue Close Maternal/Fetal Monitoring  Pitocin Augmentation per protocol  Recheck 4 hours or PRN  IUPC if unchanged at next check    Ashley Mccray MD PGY-2  Obstetrics and Gynecology  [facility]      " Scheduling for a LEFT stereo biopsy at [facility] with AARON SHAIKH and post biopsy LEFT mammogram, see Dr. Mesa after.   Biopsy 4/20/2022 11:30 arrival [facility]    We are going to try radiology biopsy rather than surgery for a diagnosis.       I had to leave a second message with patient through interpretor services.     I also mailed out letter today of all upcoming apts.

## 2023-10-16 DIAGNOSIS — I10 BENIGN ESSENTIAL HTN: ICD-10-CM

## 2023-10-16 DIAGNOSIS — E78.2 MIXED HYPERLIPIDEMIA: ICD-10-CM

## 2023-10-16 DIAGNOSIS — J30.1 SEASONAL ALLERGIC RHINITIS DUE TO POLLEN: ICD-10-CM

## 2023-10-16 RX ORDER — LOVASTATIN 40 MG/1
40 TABLET ORAL NIGHTLY
Qty: 90 TABLET | Refills: 3 | Status: SHIPPED | OUTPATIENT
Start: 2023-10-16

## 2023-10-16 RX ORDER — CHLORTHALIDONE 25 MG/1
25 TABLET ORAL DAILY
Qty: 90 TABLET | Refills: 3 | Status: SHIPPED | OUTPATIENT
Start: 2023-10-16

## 2023-10-16 RX ORDER — LORATADINE 10 MG/1
10 TABLET ORAL DAILY
Qty: 90 TABLET | Refills: 3 | Status: SHIPPED | OUTPATIENT
Start: 2023-10-16

## 2023-10-16 RX ORDER — CARVEDILOL 12.5 MG/1
12.5 TABLET ORAL 2 TIMES DAILY
Qty: 90 TABLET | Refills: 3 | Status: SHIPPED | OUTPATIENT
Start: 2023-10-16

## 2023-12-11 ENCOUNTER — TELEPHONE (OUTPATIENT)
Dept: GASTROENTEROLOGY | Facility: CLINIC | Age: 58
End: 2023-12-11
Payer: COMMERCIAL

## 2024-01-04 ENCOUNTER — LAB (OUTPATIENT)
Dept: LAB | Facility: HOSPITAL | Age: 59
End: 2024-01-04
Payer: COMMERCIAL

## 2024-01-04 ENCOUNTER — OFFICE VISIT (OUTPATIENT)
Dept: SPORTS MEDICINE | Facility: CLINIC | Age: 59
End: 2024-01-04
Payer: COMMERCIAL

## 2024-01-04 VITALS
TEMPERATURE: 97.7 F | WEIGHT: 110 LBS | BODY MASS INDEX: 22.18 KG/M2 | OXYGEN SATURATION: 99 % | HEART RATE: 88 BPM | DIASTOLIC BLOOD PRESSURE: 78 MMHG | HEIGHT: 59 IN | SYSTOLIC BLOOD PRESSURE: 122 MMHG

## 2024-01-04 DIAGNOSIS — J30.1 SEASONAL ALLERGIC RHINITIS DUE TO POLLEN: ICD-10-CM

## 2024-01-04 DIAGNOSIS — E78.2 MIXED HYPERLIPIDEMIA: ICD-10-CM

## 2024-01-04 DIAGNOSIS — Z00.00 ANNUAL PHYSICAL EXAM: Primary | ICD-10-CM

## 2024-01-04 DIAGNOSIS — K76.89 HEPATIC CYST: ICD-10-CM

## 2024-01-04 DIAGNOSIS — J06.9 UPPER RESPIRATORY TRACT INFECTION, UNSPECIFIED TYPE: ICD-10-CM

## 2024-01-04 DIAGNOSIS — R10.31 RIGHT LOWER QUADRANT ABDOMINAL PAIN: ICD-10-CM

## 2024-01-04 DIAGNOSIS — I10 BENIGN ESSENTIAL HTN: ICD-10-CM

## 2024-01-04 LAB
ALBUMIN SERPL-MCNC: 4.2 G/DL (ref 3.5–5.2)
ALBUMIN/GLOB SERPL: 1.3 G/DL
ALP SERPL-CCNC: 70 U/L (ref 39–117)
ALT SERPL W P-5'-P-CCNC: 15 U/L (ref 1–33)
AMYLASE SERPL-CCNC: 116 U/L (ref 28–100)
ANION GAP SERPL CALCULATED.3IONS-SCNC: 12.7 MMOL/L (ref 5–15)
AST SERPL-CCNC: 20 U/L (ref 1–32)
BACTERIA UR QL AUTO: NORMAL /HPF
BASOPHILS # BLD AUTO: 0.04 10*3/MM3 (ref 0–0.2)
BASOPHILS NFR BLD AUTO: 0.4 % (ref 0–1.5)
BILIRUB SERPL-MCNC: 0.4 MG/DL (ref 0–1.2)
BILIRUB UR QL STRIP: NEGATIVE
BUN SERPL-MCNC: 14 MG/DL (ref 6–20)
BUN/CREAT SERPL: 21.9 (ref 7–25)
CALCIUM SPEC-SCNC: 9.6 MG/DL (ref 8.6–10.5)
CHLORIDE SERPL-SCNC: 100 MMOL/L (ref 98–107)
CHOLEST SERPL-MCNC: 143 MG/DL (ref 0–200)
CLARITY UR: CLEAR
CO2 SERPL-SCNC: 27.3 MMOL/L (ref 22–29)
COLOR UR: YELLOW
CREAT SERPL-MCNC: 0.64 MG/DL (ref 0.57–1)
DEPRECATED RDW RBC AUTO: 39.7 FL (ref 37–54)
EGFRCR SERPLBLD CKD-EPI 2021: 103.9 ML/MIN/1.73
EOSINOPHIL # BLD AUTO: 0.13 10*3/MM3 (ref 0–0.4)
EOSINOPHIL NFR BLD AUTO: 1.3 % (ref 0.3–6.2)
ERYTHROCYTE [DISTWIDTH] IN BLOOD BY AUTOMATED COUNT: 15.1 % (ref 12.3–15.4)
GLOBULIN UR ELPH-MCNC: 3.2 GM/DL
GLUCOSE SERPL-MCNC: 100 MG/DL (ref 65–99)
GLUCOSE UR STRIP-MCNC: NEGATIVE MG/DL
HBA1C MFR BLD: 6 % (ref 4.8–5.6)
HCT VFR BLD AUTO: 40.3 % (ref 34–46.6)
HDLC SERPL QL: 3.33
HDLC SERPL-MCNC: 43 MG/DL (ref 40–60)
HGB BLD-MCNC: 12.9 G/DL (ref 12–15.9)
HGB UR QL STRIP.AUTO: ABNORMAL
HYALINE CASTS UR QL AUTO: NORMAL /LPF
HYPOCHROMIA BLD QL: NORMAL
IMM GRANULOCYTES # BLD AUTO: 0.02 10*3/MM3 (ref 0–0.05)
IMM GRANULOCYTES NFR BLD AUTO: 0.2 % (ref 0–0.5)
KETONES UR QL STRIP: NEGATIVE
LDLC SERPL CALC-MCNC: 85 MG/DL (ref 0–100)
LEUKOCYTE ESTERASE UR QL STRIP.AUTO: NEGATIVE
LIPASE SERPL-CCNC: 32 U/L (ref 13–60)
LYMPHOCYTES # BLD AUTO: 1.88 10*3/MM3 (ref 0.7–3.1)
LYMPHOCYTES NFR BLD AUTO: 19 % (ref 19.6–45.3)
MCH RBC QN AUTO: 23.7 PG (ref 26.6–33)
MCHC RBC AUTO-ENTMCNC: 32 G/DL (ref 31.5–35.7)
MCV RBC AUTO: 74.1 FL (ref 79–97)
MONOCYTES # BLD AUTO: 0.77 10*3/MM3 (ref 0.1–0.9)
MONOCYTES NFR BLD AUTO: 7.8 % (ref 5–12)
NEUTROPHILS NFR BLD AUTO: 7.04 10*3/MM3 (ref 1.7–7)
NEUTROPHILS NFR BLD AUTO: 71.3 % (ref 42.7–76)
NITRITE UR QL STRIP: NEGATIVE
NRBC BLD AUTO-RTO: 0 /100 WBC (ref 0–0.2)
PH UR STRIP.AUTO: 7 [PH] (ref 5–8)
PLAT MORPH BLD: NORMAL
PLATELET # BLD AUTO: 284 10*3/MM3 (ref 140–450)
PMV BLD AUTO: 10.1 FL (ref 6–12)
POTASSIUM SERPL-SCNC: 3.7 MMOL/L (ref 3.5–5.2)
PROT SERPL-MCNC: 7.4 G/DL (ref 6–8.5)
PROT UR QL STRIP: NEGATIVE
RBC # BLD AUTO: 5.44 10*6/MM3 (ref 3.77–5.28)
RBC # UR STRIP: NORMAL /HPF
REF LAB TEST METHOD: NORMAL
SODIUM SERPL-SCNC: 140 MMOL/L (ref 136–145)
SP GR UR STRIP: 1.02 (ref 1–1.03)
SQUAMOUS #/AREA URNS HPF: NORMAL /HPF
T4 FREE SERPL-MCNC: 1.22 NG/DL (ref 0.93–1.7)
TARGETS BLD QL SMEAR: NORMAL
TRIGL SERPL-MCNC: 77 MG/DL (ref 0–150)
TSH SERPL DL<=0.05 MIU/L-ACNC: 0.93 UIU/ML (ref 0.27–4.2)
UROBILINOGEN UR QL STRIP: ABNORMAL
VLDLC SERPL-MCNC: 15 MG/DL (ref 5–40)
WBC # UR STRIP: NORMAL /HPF
WBC MORPH BLD: NORMAL
WBC NRBC COR # BLD AUTO: 9.88 10*3/MM3 (ref 3.4–10.8)

## 2024-01-04 PROCEDURE — 87086 URINE CULTURE/COLONY COUNT: CPT | Performed by: FAMILY MEDICINE

## 2024-01-04 PROCEDURE — 83036 HEMOGLOBIN GLYCOSYLATED A1C: CPT | Performed by: FAMILY MEDICINE

## 2024-01-04 PROCEDURE — 36415 COLL VENOUS BLD VENIPUNCTURE: CPT | Performed by: FAMILY MEDICINE

## 2024-01-04 PROCEDURE — 81001 URINALYSIS AUTO W/SCOPE: CPT | Performed by: FAMILY MEDICINE

## 2024-01-04 PROCEDURE — 80050 GENERAL HEALTH PANEL: CPT | Performed by: FAMILY MEDICINE

## 2024-01-04 PROCEDURE — 99396 PREV VISIT EST AGE 40-64: CPT | Performed by: FAMILY MEDICINE

## 2024-01-04 PROCEDURE — 85007 BL SMEAR W/DIFF WBC COUNT: CPT | Performed by: FAMILY MEDICINE

## 2024-01-04 PROCEDURE — 82150 ASSAY OF AMYLASE: CPT | Performed by: FAMILY MEDICINE

## 2024-01-04 PROCEDURE — 83690 ASSAY OF LIPASE: CPT | Performed by: FAMILY MEDICINE

## 2024-01-04 PROCEDURE — 84439 ASSAY OF FREE THYROXINE: CPT | Performed by: FAMILY MEDICINE

## 2024-01-04 PROCEDURE — 80061 LIPID PANEL: CPT | Performed by: FAMILY MEDICINE

## 2024-01-04 RX ORDER — FLUTICASONE PROPIONATE 50 MCG
2 SPRAY, SUSPENSION (ML) NASAL DAILY
Qty: 16 G | Refills: 11 | Status: SHIPPED | OUTPATIENT
Start: 2024-01-04

## 2024-01-04 RX ORDER — DICYCLOMINE HYDROCHLORIDE 10 MG/1
10 CAPSULE ORAL 3 TIMES DAILY PRN
Qty: 90 CAPSULE | Refills: 2 | Status: SHIPPED | OUTPATIENT
Start: 2024-01-04

## 2024-01-04 RX ORDER — POLYETHYLENE GLYCOL 3350 17 G/17G
17 POWDER, FOR SOLUTION ORAL 2 TIMES DAILY
COMMUNITY

## 2024-01-04 RX ORDER — AZITHROMYCIN 250 MG/1
TABLET, FILM COATED ORAL
Qty: 6 TABLET | Refills: 0 | Status: SHIPPED | OUTPATIENT
Start: 2024-01-04

## 2024-01-04 NOTE — PROGRESS NOTES
"Savita Lucio is here today for an annual physical exam.  Visit was initiated with the services of an , but the connection was lost so the remainder of the visit was completed with a mix of English and Azeri between the physician and patient.  Patient was comfortable with this.    Overall health is stable.  Still struggling with right side abdominal pain, waxing and waning. Back and forth between GI and surgery, but not since her surgery was cancelled by her covid infection. Pain subsides after bowel movement. Recently started miralax bid a few days ago which seems to help some. Pain is worse with eating. Lost some weight because of less eating due to pain. Added probiotic otc.     Notes more dry skin but possibly from drinking less water.     Mild URI and sore throat    PHQ-2 Depression Screening  Little interest or pleasure in doing things? 0-->not at all   Feeling down, depressed, or hopeless? 0-->not at all (YES, SEVERAL MONTHS AGO.)   PHQ-2 Total Score 0       Health Maintenance   Topic Date Due    DXA SCAN  Never done    HEPATITIS C SCREENING  Never done    ZOSTER VACCINE (1 of 2) Never done    ANNUAL PHYSICAL  01/05/2023    INFLUENZA VACCINE  Never done    COVID-19 Vaccine (3 - 2023-24 season) 09/01/2023    Annual Gynecologic Pelvic and Breast Exam  02/22/2024    MAMMOGRAM  04/27/2024    LIPID PANEL  01/04/2025    PAP SMEAR  02/21/2026    TDAP/TD VACCINES (2 - Td or Tdap) 01/20/2033    COLORECTAL CANCER SCREENING  04/14/2033    Pneumococcal Vaccine 0-64  Aged Out       Review of Systems    /78 (BP Location: Right arm, Patient Position: Sitting, Cuff Size: Adult)   Pulse 88   Temp 97.7 °F (36.5 °C) (Temporal)   Ht 149.9 cm (59.02\")   Wt 49.9 kg (110 lb)   SpO2 99%   BMI 22.20 kg/m²      BMI is within normal parameters. No other follow-up for BMI required.      Physical Exam    Vital signs reviewed.  General appearance: No acute distress  Eyes: conjunctiva clear without erythema; pupils " equally round and reactive  ENT: external ears normal; hearing normal  Neck: supple; no thyromegaly  CV: normal rate and rhythm; no peripheral edema  Respiratory: normal respiratory effort; lungs clear to auscultation bilaterally  MSK: normal gait and station; no focal joint deformity or swelling  Skin: no rash or wounds; normal turgor  Neuro: cranial nerves 2-12 grossly intact; normal sensation to light touch  Psych: mood and affect normal; recent and remote memory intact      Current Outpatient Medications:     acetaminophen (TYLENOL) 500 MG tablet, Take 2 tablets by mouth Every 6 (Six) Hours As Needed for Mild Pain., Disp: , Rfl:     Ascorbic Acid (VITAMIN C PO), Take 2,000 mg by mouth Daily. HOLDING FOR SURGERY, Disp: , Rfl:     carvedilol (COREG) 12.5 MG tablet, Take 1 tablet by mouth 2 (Two) Times a Day., Disp: 90 tablet, Rfl: 3    chlorthalidone (HYGROTON) 25 MG tablet, Take 1 tablet by mouth Daily., Disp: 90 tablet, Rfl: 3    cholecalciferol (VITAMIN D3) 25 MCG (1000 UT) tablet, Take 1 tablet by mouth Daily. HOLDING FOR SURGERY, Disp: , Rfl:     COLLAGEN-VITAMIN C PO, Take 1 capsule by mouth Daily. HOLDING FOR SURGERY, Disp: , Rfl:     dicyclomine (BENTYL) 10 MG capsule, Take 1 capsule by mouth 3 (Three) Times a Day As Needed (abdominal cramping, fecal urgency, and diarrhea)., Disp: 90 capsule, Rfl: 2    ferrous sulfate 325 (65 FE) MG tablet, Take 1 tablet by mouth Daily With Breakfast., Disp: , Rfl:     fluticasone (Flonase) 50 MCG/ACT nasal spray, 2 sprays into the nostril(s) as directed by provider Daily., Disp: 16 g, Rfl: 11    ibuprofen (ADVIL,MOTRIN) 800 MG tablet, Take 1 tablet by mouth Every 8 (Eight) Hours As Needed for Mild Pain., Disp: 21 tablet, Rfl: 0    loratadine (CLARITIN) 10 MG tablet, Take 1 tablet by mouth Daily. As needed for allergies, Disp: 90 tablet, Rfl: 3    lovastatin (MEVACOR) 40 MG tablet, Take 1 tablet by mouth Every Night., Disp: 90 tablet, Rfl: 3    Multiple Vitamins-Minerals  (ZINC PO), Take 50 mg by mouth Daily. HOLDING FOR SURGERY, Disp: , Rfl:     VITAMIN K PO, Take 1,000 mcg by mouth Daily. HOLDING FOR SURGERY, Disp: , Rfl:     azithromycin (Zithromax Z-Ty) 250 MG tablet, Take 2 tablets the first day, then 1 tablet daily for 4 days., Disp: 6 tablet, Rfl: 0    polyethylene glycol (MiraLax) 17 GM/SCOOP powder, Take 17 g by mouth 2 (Two) Times a Day., Disp: , Rfl:     Diagnoses and all orders for this visit:    1. Annual physical exam (Primary)  -     CBC & Differential  -     Comprehensive Metabolic Panel  -     Lipid Panel With / Chol / HDL Ratio  -     Hemoglobin A1c  -     Amylase  -     Lipase  -     T4, Free  -     TSH  -     Urinalysis With Microscopic - Urine, Clean Catch  -     Urine Culture - Urine, Urine, Clean Catch    2. Hepatic cyst  -     CBC & Differential  -     Comprehensive Metabolic Panel  -     Lipid Panel With / Chol / HDL Ratio  -     Hemoglobin A1c  -     Amylase  -     Lipase  -     T4, Free  -     TSH  -     Urinalysis With Microscopic - Urine, Clean Catch  -     Urine Culture - Urine, Urine, Clean Catch  -     CT abdomen pelvis w contrast; Future    3. Right lower quadrant abdominal pain  -     CBC & Differential  -     Comprehensive Metabolic Panel  -     Lipid Panel With / Chol / HDL Ratio  -     Hemoglobin A1c  -     Amylase  -     Lipase  -     T4, Free  -     TSH  -     Urinalysis With Microscopic - Urine, Clean Catch  -     Urine Culture - Urine, Urine, Clean Catch  -     CT abdomen pelvis w contrast; Future    4. Mixed hyperlipidemia  -     CBC & Differential  -     Comprehensive Metabolic Panel  -     Lipid Panel With / Chol / HDL Ratio  -     Hemoglobin A1c  -     Amylase  -     Lipase  -     T4, Free  -     TSH  -     Urinalysis With Microscopic - Urine, Clean Catch  -     Urine Culture - Urine, Urine, Clean Catch    5. Benign essential HTN  -     CBC & Differential  -     Comprehensive Metabolic Panel  -     Lipid Panel With / Chol / HDL Ratio  -      Hemoglobin A1c  -     Amylase  -     Lipase  -     T4, Free  -     TSH  -     Urinalysis With Microscopic - Urine, Clean Catch  -     Urine Culture - Urine, Urine, Clean Catch    6. Upper respiratory tract infection, unspecified type    7. Seasonal allergic rhinitis due to pollen  -     fluticasone (Flonase) 50 MCG/ACT nasal spray; 2 sprays into the nostril(s) as directed by provider Daily.  Dispense: 16 g; Refill: 11    Other orders  -     azithromycin (Zithromax Z-Ty) 250 MG tablet; Take 2 tablets the first day, then 1 tablet daily for 4 days.  Dispense: 6 tablet; Refill: 0  -     dicyclomine (BENTYL) 10 MG capsule; Take 1 capsule by mouth 3 (Three) Times a Day As Needed (abdominal cramping, fecal urgency, and diarrhea).  Dispense: 90 capsule; Refill: 2        Encourage healthy diet and exercise.  Encourage patient to stay up to date on screening examinations as indicated based on age and risk factors.  Based on her chronic abdominal complaints I think we should repeat the CT scan to evaluate the status of her hepatic cysts.  Then determine follow-up plan with surgical consult.      EMR Dragon/Transcription disclaimer:    Much of this encounter note is an electronic transcription/translation of spoken language to printed text.  The electronic translation of spoken language may permit erroneous, or at times, nonsensical words or phrases to be inadvertently transcribed.  Although I have reviewed the note for such errors some may still exist.

## 2024-01-05 LAB — BACTERIA SPEC AEROBE CULT: NO GROWTH

## 2024-01-09 NOTE — PROGRESS NOTES
I attempted to contact patient with the help of [employer] in regards to results. Unfortunately,  she does not have VM set up/ Mailbox is full. Will try again later.     -CHARLES Chairez/OLIVA

## 2024-04-01 ENCOUNTER — TRANSCRIBE ORDERS (OUTPATIENT)
Dept: ADMINISTRATIVE | Facility: HOSPITAL | Age: 59
End: 2024-04-01
Payer: COMMERCIAL

## 2024-04-01 DIAGNOSIS — Z12.31 VISIT FOR SCREENING MAMMOGRAM: Primary | ICD-10-CM

## 2024-05-01 ENCOUNTER — HOSPITAL ENCOUNTER (OUTPATIENT)
Dept: MAMMOGRAPHY | Facility: HOSPITAL | Age: 59
Discharge: HOME | End: 2024-05-01
Payer: COMMERCIAL

## 2024-06-20 DIAGNOSIS — N95.1 PERIMENOPAUSE: Primary | ICD-10-CM

## 2024-06-20 RX ORDER — DICYCLOMINE HYDROCHLORIDE 10 MG/1
10 CAPSULE ORAL 3 TIMES DAILY PRN
Qty: 90 CAPSULE | Refills: 2 | Status: SHIPPED | OUTPATIENT
Start: 2024-06-20

## 2024-06-20 NOTE — TELEPHONE ENCOUNTER
Patient called aileenng she has been having left shoulder pain, patient requested an appointment asap.   Scheduled on 06/25.      Patient is also requesting a refill on her Diclyclomine medication sent to [facility] [address].     Patient is requesting a new referral to a [facility].    Please advise,     Patient was provided provided with [phone] number to call and scheduled missed Mammo and CT of abdomen appointment.

## 2024-06-25 ENCOUNTER — OFFICE VISIT (OUTPATIENT)
Dept: SPORTS MEDICINE | Facility: CLINIC | Age: 59
End: 2024-06-25
Payer: COMMERCIAL

## 2024-06-25 VITALS
OXYGEN SATURATION: 99 % | HEIGHT: 59 IN | DIASTOLIC BLOOD PRESSURE: 80 MMHG | TEMPERATURE: 97.6 F | SYSTOLIC BLOOD PRESSURE: 130 MMHG | WEIGHT: 110 LBS | HEART RATE: 79 BPM | BODY MASS INDEX: 22.18 KG/M2

## 2024-06-25 DIAGNOSIS — M75.82 ROTATOR CUFF TENDINITIS, LEFT: ICD-10-CM

## 2024-06-25 DIAGNOSIS — M19.012 ARTHRITIS OF LEFT ACROMIOCLAVICULAR JOINT: ICD-10-CM

## 2024-06-25 DIAGNOSIS — G89.29 CHRONIC LEFT SHOULDER PAIN: Primary | ICD-10-CM

## 2024-06-25 DIAGNOSIS — M25.512 CHRONIC LEFT SHOULDER PAIN: Primary | ICD-10-CM

## 2024-06-25 DIAGNOSIS — M75.42 IMPINGEMENT SYNDROME OF SHOULDER, LEFT: ICD-10-CM

## 2024-06-25 PROCEDURE — 99214 OFFICE O/P EST MOD 30 MIN: CPT | Performed by: FAMILY MEDICINE

## 2024-06-25 RX ORDER — MELOXICAM 15 MG/1
15 TABLET ORAL DAILY
Qty: 30 TABLET | Refills: 2 | Status: SHIPPED | OUTPATIENT
Start: 2024-06-25

## 2024-06-25 NOTE — PROGRESS NOTES
"Savita is a 59 y.o. year old female     Chief Complaint   Patient presents with    Shoulder Pain     LEFT SHOULDER- Patient states she has chronic shoulder pain.        History of Present Illness  History of Present Illness  The patient is here today for left shoulder pain that has been gradually worsening without specific injury.    The patient has been experiencing persistent pain in her left shoulder for several months, which intensifies during physical activities such as lifting her granddaughter or lifting a heavy box. Additionally, she expresses concern due to an asymmetric appearance of the clavicle extending to the sternum, although there is no associated pain in this area. The pain is primarily localized to the shoulder, radiating to the upper arm, and describes the pain as mild to moderate in severity.    I have reviewed the patient's medical, family, and social history in detail and updated the computerized patient record.    Review of Systems    /80 (BP Location: Right arm, Patient Position: Sitting, Cuff Size: Adult)   Pulse 79   Temp 97.6 °F (36.4 °C) (Temporal)   Ht 149.9 cm (59.02\")   Wt 49.9 kg (110 lb)   SpO2 99%   BMI 22.20 kg/m²      Physical Exam    Vital signs reviewed.   General: No acute distress.      Physical Exam  Left shoulder exhibits an asymmetric appearance with prominence of the clavicle on the left side compared to the right. This is prominent distally both at the AC joint as well as proximally at the sternoclavicular joint where the clavicle appears prominent anteriorly as well as superiorly. There is no crepitus at either of these articulations. There is no tenderness at the AC joint or the SC joint. Normal range of motion of the left shoulder. Positive impingement signs with positive Neer, positive Ricci, positive painful arc. Normal strength with rotator cuff strength testing. There is mild pain with empty can maneuver, negative circumduction, although there is pain " with moving the arm across the body, equivocal cross arm.    Results  Results    Left Shoulder X-Ray  Indication: Pain  Views: AP Internal and External Rotation views of the left shoulder, bilateral clavicle views    Findings:  No fracture  No bony lesion  Normal soft tissues  AC joint arthritis with joint space narrowing and subchondral cyst formation on the left side    No prior studies were available for comparison.     Procedures     Diagnoses and all orders for this visit:    Chronic left shoulder pain  -     XR Shoulder 2+ View Left  -     XR Clavicle Bilateral  -     Ambulatory Referral to Physical Therapy for Evaluation & Treatment    Rotator cuff tendinitis, left  -     Ambulatory Referral to Physical Therapy for Evaluation & Treatment    Impingement syndrome of shoulder, left  -     Ambulatory Referral to Physical Therapy for Evaluation & Treatment    Arthritis of left acromioclavicular joint  -     Ambulatory Referral to Physical Therapy for Evaluation & Treatment    Other orders  -     meloxicam (Mobic) 15 MG tablet; Take 1 tablet by mouth Daily.      Assessment & Plan    Overall I think the main issue here is impingement of her left shoulder with likely underlying rotator cuff tendinosis.  She may have some element of dyskinetic clavicular motion secondary to this, or possibly some benign long-term asymmetry.  Given the absence of pain or mechanical dysfunction at the sternoclavicular joint this pain had benign.  Will start with some physical therapy first, meloxicam for pain reduction, follow-up in about 1 month to check her progress.  Consider injection if needed.    Patient or patient representative verbalized consent for the use of Ambient Listening during the visit with  Diego Crum MD for chart documentation. 6/25/2024  17:43 EDT  *Dictated after leaving exam room.   Diego Crum MD   09:12 EDT   06/25/24

## 2024-07-09 DIAGNOSIS — I10 BENIGN ESSENTIAL HTN: ICD-10-CM

## 2024-07-09 RX ORDER — CARVEDILOL 12.5 MG/1
12.5 TABLET ORAL 2 TIMES DAILY
Qty: 60 TABLET | Refills: 0 | Status: SHIPPED | OUTPATIENT
Start: 2024-07-09

## 2024-08-05 ENCOUNTER — HOSPITAL ENCOUNTER (OUTPATIENT)
Dept: CT IMAGING | Facility: HOSPITAL | Age: 59
Discharge: HOME | End: 2024-08-05
Admitting: FAMILY MEDICINE
Payer: COMMERCIAL

## 2024-08-05 DIAGNOSIS — K76.89 HEPATIC CYST: ICD-10-CM

## 2024-08-05 DIAGNOSIS — R10.31 RIGHT LOWER QUADRANT ABDOMINAL PAIN: ICD-10-CM

## 2024-08-05 PROCEDURE — 74177 CT ABD & PELVIS W/CONTRAST: CPT

## 2024-08-05 PROCEDURE — 25510000001 IOPAMIDOL 61 % SOLUTION: Performed by: FAMILY MEDICINE

## 2024-08-05 PROCEDURE — 0 DIATRIZOATE MEGLUMINE & SODIUM PER 1 ML: Performed by: FAMILY MEDICINE

## 2024-08-05 RX ADMIN — IOPAMIDOL 85 ML: 612 INJECTION, SOLUTION INTRAVENOUS at 07:57

## 2024-08-05 RX ADMIN — DIATRIZOATE MEGLUMINE AND DIATRIZOATE SODIUM 30 ML: 660; 100 LIQUID ORAL; RECTAL at 07:57

## 2024-08-07 DIAGNOSIS — K38.8 APPENDICULAR MUCOCELE: Primary | ICD-10-CM

## 2024-08-12 DIAGNOSIS — I10 BENIGN ESSENTIAL HTN: ICD-10-CM

## 2024-08-12 RX ORDER — CARVEDILOL 12.5 MG/1
12.5 TABLET ORAL 2 TIMES DAILY
Qty: 180 TABLET | Refills: 1 | Status: SHIPPED | OUTPATIENT
Start: 2024-08-12

## 2024-08-14 ENCOUNTER — TELEPHONE (OUTPATIENT)
Dept: SPORTS MEDICINE | Facility: CLINIC | Age: 59
End: 2024-08-14
Payer: COMMERCIAL

## 2024-08-14 NOTE — TELEPHONE ENCOUNTER
Patient called to inquire why she was referred to a general surgeon.  Advised patient that Dr Crum put in the referral to general surgery because there was a concern about the appendix on her CT scan that she had done.  Patient understood with the help of an .

## 2024-08-19 ENCOUNTER — OFFICE VISIT (OUTPATIENT)
Dept: SURGERY | Facility: CLINIC | Age: 59
End: 2024-08-19
Payer: COMMERCIAL

## 2024-08-19 VITALS
BODY MASS INDEX: 22.58 KG/M2 | WEIGHT: 112 LBS | SYSTOLIC BLOOD PRESSURE: 128 MMHG | HEART RATE: 87 BPM | HEIGHT: 59 IN | DIASTOLIC BLOOD PRESSURE: 84 MMHG | OXYGEN SATURATION: 98 %

## 2024-08-19 DIAGNOSIS — K38.8 MUCOCELE OF APPENDIX: Primary | ICD-10-CM

## 2024-08-19 PROCEDURE — 99214 OFFICE O/P EST MOD 30 MIN: CPT | Performed by: STUDENT IN AN ORGANIZED HEALTH CARE EDUCATION/TRAINING PROGRAM

## 2024-08-19 NOTE — H&P (VIEW-ONLY)
General Surgery History and Physical      Summary:    Savita Lucio is a 59 y.o. 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  After discussing the risks of appendectomy including bleeding, infection, conversion to open surgery, as well as the benefits and alternatives she agreed to proceed.  We will get her scheduled for elective laparoscopic appendectomy.      History of Present Illness:    Savita Lucio is a 59 y.o. [place]ib[place]ian-speaking lady with right lower quadrant pain over the last 2 years.  Her pain is sometimes exacerbated by eating as well as sleeping on the right side.  Denies fevers chills nausea and vomiting.  CT scan performed 8/5 demonstrates markedly dilated appendix without stranding, appendicolith, or ascites.  She also had a benign-appearing cyst in the right lobe of the liver and a small cyst in the right ovary.  There is no scalloping of the liver.  CT scan performed in 2022 demonstrated similar benign-appearing liver cyst, however no dilation of the appendix.  She underwent colonoscopy with Dr. Collins in April, the cecum was photo documented and the colonoscopy was normal.    Past Medical History:   Past Medical History:   Diagnosis Date    Acid reflux 07/15/2016    Anemia     Anxiety     Breast pain, left     Breast pain, right     Hemorrhoid     History of migraine     History of Papanicolaou smear of cervix 03/17/2015    History of tachycardia     HLD (hyperlipidemia) 07/15/2016    Hypertension     Lab test positive for detection of COVID-19 virus           Past Surgical History:    Past Surgical History:   Procedure Laterality Date    BREAST BIOPSY Left     Benign    BREAST BIOPSY Left 06/15/2022    Procedure: left breast needle localized excision of radial scar.;  Surgeon:  Kerline Mesa MD;  Location: [facility] OR;  Service: General;  Laterality: Left;    BREAST SURGERY       SECTION       SECTION      COLONOSCOPY      CYSTECTOMY Left     Below left buttock    ENDOSCOPY      HEMORRHOIDECTOMY          Family History:    Noncontributory    Social History:    Tobacco: Never  Alcohol: Never  Illicit Drugs: Never      Medications:   Reviewed in the chart      Laboratory Results  none    Radiology  I have personally reviewed CT scan from 12/15/2022: Normal appearing appendix and cecum, benign appearing liver cyst in segment VII, simple right adnexal cyst    CT scan from 2024: markedly dilated fluid filled appendix without fecalith, periappendiceal stranding, or free intraperitoneal fluid, stable benign appearing right adnexal and right liver cysts, normal appearing cecum    Endoscopy  I have personally reviewed Colonoscopy report and images performed by Dr. Collins on 2023: no cecal lesions identified      Physical Exam:   Vitals:    24 1018   BP: 128/84   Pulse: 87   SpO2: 98%      General: not sick, awake and alert  HEENT: no scleral icterus, moist oral mucosa  Cardiac: normal rate, no JVD, no peripheral edema  Respiratory: nonlabored breathing on room air  Abdomen: soft, nontender, nondistended, no guarding, incision dry and intact  Skin: no jaundice, rash, or lesions visualized     Body mass index is 22.61 kg/m².   BMI is within normal parameters. No other follow-up for BMI required.             Trenton Lao M.D.  General Surgery  [facility]    [address], [address]  [place], [place], [facility]  P: [phone]  F: [phone]

## 2024-08-19 NOTE — PROGRESS NOTES
General Surgery History and Physical      Summary:    Savita Lucio is a 59 y.o. 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  After discussing the risks of appendectomy including bleeding, infection, conversion to open surgery, as well as the benefits and alternatives she agreed to proceed.  We will get her scheduled for elective laparoscopic appendectomy.      History of Present Illness:    Savita Lucio is a 59 y.o. Moldovan-speaking lady with right lower quadrant pain over the last 2 years.  Her pain is sometimes exacerbated by eating as well as sleeping on the right side.  Denies fevers chills nausea and vomiting.  CT scan performed 8/5 demonstrates markedly dilated appendix without stranding, appendicolith, or ascites.  She also had a benign-appearing cyst in the right lobe of the liver and a small cyst in the right ovary.  There is no scalloping of the liver.  CT scan performed in 2022 demonstrated similar benign-appearing liver cyst, however no dilation of the appendix.  She underwent colonoscopy with Dr. Collins in April, the cecum was photo documented and the colonoscopy was normal.    Past Medical History:   Past Medical History:   Diagnosis Date    Acid reflux 07/15/2016    Anemia     Anxiety     Breast pain, left     Breast pain, right     Hemorrhoid     History of migraine     History of Papanicolaou smear of cervix 03/17/2015    History of tachycardia     HLD (hyperlipidemia) 07/15/2016    Hypertension     Lab test positive for detection of COVID-19 virus           Past Surgical History:    Past Surgical History:   Procedure Laterality Date    BREAST BIOPSY Left     Benign    BREAST BIOPSY Left 06/15/2022    Procedure: left breast needle localized excision of radial scar.;  Surgeon:  Kerline Meas MD;  Location: [facility] OR;  Service: General;  Laterality: Left;    BREAST SURGERY       SECTION       SECTION      COLONOSCOPY      CYSTECTOMY Left     Below left buttock    ENDOSCOPY      HEMORRHOIDECTOMY          Family History:    Noncontributory    Social History:    Tobacco: Never  Alcohol: Never  Illicit Drugs: Never      Medications:   Reviewed in the chart      Laboratory Results  none    Radiology  I have personally reviewed CT scan from 12/15/2022: Normal appearing appendix and cecum, benign appearing liver cyst in segment VII, simple right adnexal cyst    CT scan from 2024: markedly dilated fluid filled appendix without fecalith, periappendiceal stranding, or free intraperitoneal fluid, stable benign appearing right adnexal and right liver cysts, normal appearing cecum    Endoscopy  I have personally reviewed Colonoscopy report and images performed by Dr. Collins on 2023: no cecal lesions identified      Physical Exam:   Vitals:    24 1018   BP: 128/84   Pulse: 87   SpO2: 98%      General: not sick, awake and alert  HEENT: no scleral icterus, moist oral mucosa  Cardiac: normal rate, no JVD, no peripheral edema  Respiratory: nonlabored breathing on room air  Abdomen: soft, nontender, nondistended, no guarding, incision dry and intact  Skin: no jaundice, rash, or lesions visualized     Body mass index is 22.61 kg/m².   BMI is within normal parameters. No other follow-up for BMI required.             Trenton Lao M.D.  General Surgery  [facility]    [address], [address]  [place], [place], [facility]  P: [phone]  F: [phone]

## 2024-08-27 DIAGNOSIS — I10 BENIGN ESSENTIAL HTN: ICD-10-CM

## 2024-08-27 RX ORDER — CARVEDILOL 12.5 MG/1
12.5 TABLET ORAL 2 TIMES DAILY
Qty: 180 TABLET | Refills: 3 | Status: SHIPPED | OUTPATIENT
Start: 2024-08-27

## 2024-08-28 ENCOUNTER — PRE-ADMISSION TESTING (OUTPATIENT)
Dept: PREADMISSION TESTING | Facility: HOSPITAL | Age: 59
End: 2024-08-28
Payer: COMMERCIAL

## 2024-08-28 VITALS
HEART RATE: 87 BPM | OXYGEN SATURATION: 98 % | BODY MASS INDEX: 23.63 KG/M2 | DIASTOLIC BLOOD PRESSURE: 82 MMHG | RESPIRATION RATE: 16 BRPM | SYSTOLIC BLOOD PRESSURE: 138 MMHG | WEIGHT: 112.6 LBS | TEMPERATURE: 97.8 F | HEIGHT: 58 IN

## 2024-08-28 LAB
ANION GAP SERPL CALCULATED.3IONS-SCNC: 10.4 MMOL/L (ref 5–15)
BUN SERPL-MCNC: 13 MG/DL (ref 6–20)
BUN/CREAT SERPL: 18.1 (ref 7–25)
CALCIUM SPEC-SCNC: 9.9 MG/DL (ref 8.6–10.5)
CHLORIDE SERPL-SCNC: 102 MMOL/L (ref 98–107)
CO2 SERPL-SCNC: 28.6 MMOL/L (ref 22–29)
CREAT SERPL-MCNC: 0.72 MG/DL (ref 0.57–1)
DEPRECATED RDW RBC AUTO: 39.2 FL (ref 37–54)
EGFRCR SERPLBLD CKD-EPI 2021: 97.7 ML/MIN/1.73
ERYTHROCYTE [DISTWIDTH] IN BLOOD BY AUTOMATED COUNT: 14.1 % (ref 12.3–15.4)
GLUCOSE SERPL-MCNC: 119 MG/DL (ref 65–99)
HCT VFR BLD AUTO: 41.2 % (ref 34–46.6)
HGB BLD-MCNC: 12.4 G/DL (ref 12–15.9)
MCH RBC QN AUTO: 23.4 PG (ref 26.6–33)
MCHC RBC AUTO-ENTMCNC: 30.1 G/DL (ref 31.5–35.7)
MCV RBC AUTO: 77.7 FL (ref 79–97)
PLATELET # BLD AUTO: 298 10*3/MM3 (ref 140–450)
PMV BLD AUTO: 9.9 FL (ref 6–12)
POTASSIUM SERPL-SCNC: 4 MMOL/L (ref 3.5–5.2)
QT INTERVAL: 374 MS
QTC INTERVAL: 410 MS
RBC # BLD AUTO: 5.3 10*6/MM3 (ref 3.77–5.28)
SODIUM SERPL-SCNC: 141 MMOL/L (ref 136–145)
WBC NRBC COR # BLD AUTO: 7.09 10*3/MM3 (ref 3.4–10.8)

## 2024-08-28 PROCEDURE — 80048 BASIC METABOLIC PNL TOTAL CA: CPT | Performed by: STUDENT IN AN ORGANIZED HEALTH CARE EDUCATION/TRAINING PROGRAM

## 2024-08-28 PROCEDURE — 85027 COMPLETE CBC AUTOMATED: CPT | Performed by: STUDENT IN AN ORGANIZED HEALTH CARE EDUCATION/TRAINING PROGRAM

## 2024-08-28 PROCEDURE — 36415 COLL VENOUS BLD VENIPUNCTURE: CPT | Performed by: STUDENT IN AN ORGANIZED HEALTH CARE EDUCATION/TRAINING PROGRAM

## 2024-08-28 PROCEDURE — 93005 ELECTROCARDIOGRAM TRACING: CPT

## 2024-08-28 NOTE — DISCHARGE INSTRUCTIONS
Niland los siguientes medicamentos la mañana de la operación con un pequeño   sorbo de agua:  CARVEDILOL      Si está tomando analgésicos narcóticos con receta para controlar el dolor,   también puede tomarlos la mañana de la operación.  Instrucciones generales:   NO coma ni isabel nada después de la medianoche antes de la operación.   Los bebés pueden ashlie leche materna hasta cuatro horas antes de la operación.   Los bebés que fabrice fórmula pueden beber fórmula hasta seis horas antes de   la operación.    Los pacientes que evitan fumar, masticar tabaco y beber alcohol antonio   4 semanas antes de la operación tienen un riesgo reducido de complicaciones   posoperatorias. Deje de fumar tantos días antes de la operación yimi pueda.   No fume, mastique tabaco ni isabel alcohol el día de la operación.    Si corresponde, traiga woo máquina C-PAP/BI-PAP el día previo a la operación.   Traiga todos los documentos que le entregaron en el consultorio del médico.   Use ropa limpia y cómoda.   No use lentes de contacto, pestañas postizas ni maquillaje. Traiga un estuche   para rashida anteojos.    Traiga muletas o andador si corresponde.   Quítese todos los piercings. Deje las joyas y cualquier otro objeto de valor   en casa.   Deben quitarse las extensiones de pelo con clips metálicos antes de la operación.   El enfermero de Pruebas Previas al Ingreso (Pre-Admission Testing) le indicará   que traiga medicamentos si no puede obtener bernice lista precisa en las pruebas   previas al ingreso.  Si le entregaron un brazalete de identificación del banco de ty, recuerde   traerlo con usted el día de la operación.  Prevención de bernice infección en el lugar de la operación:   Antonio 2 o 3 días antes de la operación, evite afeitarse con bernice rasuradora   porque esta puede irritar la piel y hacer que se desarrolle bernice infección.    Cualquier área de piel abierta puede aumentar el riesgo de bernice infección de la   herida posoperatoria al permitir que  las bacterias entren y se propaguen en todo   el cuerpo. Informe a woo cirujano si tiene alguna herida/sarpullido en la piel,   incluso si no está cerca del lugar de la operación. Será necesario evaluar el área   para determinar si la operación debe retrasarse hasta que sane.   La noche antes de la operación, báñese con bernice pastilla nueva de jabón   antibacteriano (yimi Dial) y bernice toalla limpia. Duerma en bernice cama limpia con   ropa limpia. No permita que las mascotas duerman con usted.   Báñese la mañana de la operación usando bernice pastilla nueva de jabón   antibacteriano (yimi Dial) y bernice toalla limpia. Séquese con bernice toalla limpia y   vístase con ropa limpia.   Pregúntele al cirujano si recibirá antibióticos antes de la operación.   Asegúrese de que usted, woo katalina y todos los proveedores de atención médica   se laven las itzel con agua y jabón o con un desinfectante de itzel a base   de alcohol antes de atenderlo o de curar woo herida.  Día de la operación:  Woo hora de llegada es aproximadamente dos horas antes de la hora programada   para woo operación. A woo llegada, un enfermero preoperatorio y un anestesista   revisarán woo historia médica, obtendrán rashida signos vitales y responderán las   preguntas que pueda tener. Las únicas cosas necesarias en denise momento   serán bernice lista de rashida medicamentos de casa y si corresponde, woo máquina   C-PAP/BI-PAP. Un enfermero preoperatorio le pondrá bernice vía intravenosa y es   posible que reciba medicamentos yimi preparación para la operación, incluyendo   algo para ayudarlo a relajarse.  Tenga en cuenta que la operación conlleva malestares. Queremos hacer todo lo   posible para manejar woo malestar, por lo que le pedimos que hable con woo enfermero   sobre cualquier síntoma que no esté controlado. Lo más probable es que woo médico   le recetó analgésicos.  Si regresa a casa después de la operación, recibirá instrucciones de atención   individualizadas por escrito antes de que le  den el johana. Un adulto responsable debe   estar con usted de ceasar y vuelta del hospital el día de la operación y mantenerse con   usted ria la noche. Las recetas del johana se pueden surtir en la farmacia del   hospital ria el horario habitual de la farmacia. Si la operación será al final del   día/noche, es posible que woo receta la envíen electrónicamente a woo farmacia.  Verifique el horario de woo farmacia o elija bernice farmacia de 24 horas para evitar no   poder acceder a woo receta porque woo farmacia cerró por el día.  Si va a pasar la noche en el hospital después de la operación, lo trasladarán a woo   habitación después del período de recuperación. Todas las habitaciones de [facility] son privadas.  Si tiene alguna pregunta, llame a Pruebas Previas al Ingreso al [phone].  Los deducibles y copagos se cobran el día del servicio. Esté preparado para pagar   el copago, deducible o depósito necesario el día del servicio según lo establecido por   woo plan.  Llame al cirujano inmediatamente si tiene alguno de los siguientes síntomas:   Dolor de garganta   Falta de aire o dificultad para respirar   Tos   Escalofríos   Dolor de cuerpo o dolor muscular   Dolor de barbara   Fiebre   Nueva pérdida del gusto o del olfato   No llegue enfermo a la operación. Woo procedimiento deberá reprogramarse para   otro momento. Deberá llamar a woo médico antes del día de la operación para   evitar exponer innecesariamente al personal del hospital y a otros pacientes.

## 2024-09-05 ENCOUNTER — ANESTHESIA EVENT (OUTPATIENT)
Dept: PERIOP | Facility: HOSPITAL | Age: 59
End: 2024-09-05
Payer: COMMERCIAL

## 2024-09-05 ENCOUNTER — ANESTHESIA (OUTPATIENT)
Dept: PERIOP | Facility: HOSPITAL | Age: 59
End: 2024-09-05
Payer: COMMERCIAL

## 2024-09-05 ENCOUNTER — HOSPITAL ENCOUNTER (OUTPATIENT)
Facility: HOSPITAL | Age: 59
Setting detail: HOSPITAL OUTPATIENT SURGERY
Discharge: HOME | End: 2024-09-05
Attending: STUDENT IN AN ORGANIZED HEALTH CARE EDUCATION/TRAINING PROGRAM | Admitting: STUDENT IN AN ORGANIZED HEALTH CARE EDUCATION/TRAINING PROGRAM
Payer: COMMERCIAL

## 2024-09-05 VITALS
OXYGEN SATURATION: 99 % | HEART RATE: 78 BPM | SYSTOLIC BLOOD PRESSURE: 133 MMHG | DIASTOLIC BLOOD PRESSURE: 73 MMHG | TEMPERATURE: 97.6 F | RESPIRATION RATE: 16 BRPM

## 2024-09-05 DIAGNOSIS — K38.8 MUCOCELE OF APPENDIX: Primary | ICD-10-CM

## 2024-09-05 PROCEDURE — 25810000003 SODIUM CHLORIDE PER 500 ML: Performed by: STUDENT IN AN ORGANIZED HEALTH CARE EDUCATION/TRAINING PROGRAM

## 2024-09-05 PROCEDURE — 88304 TISSUE EXAM BY PATHOLOGIST: CPT | Performed by: STUDENT IN AN ORGANIZED HEALTH CARE EDUCATION/TRAINING PROGRAM

## 2024-09-05 PROCEDURE — 25010000002 BUPIVACAINE 0.5 % SOLUTION: Performed by: STUDENT IN AN ORGANIZED HEALTH CARE EDUCATION/TRAINING PROGRAM

## 2024-09-05 PROCEDURE — 44970 LAPAROSCOPY APPENDECTOMY: CPT | Performed by: STUDENT IN AN ORGANIZED HEALTH CARE EDUCATION/TRAINING PROGRAM

## 2024-09-05 PROCEDURE — 25010000002 MIDAZOLAM PER 1 MG: Performed by: ANESTHESIOLOGY

## 2024-09-05 PROCEDURE — 25010000002 DEXAMETHASONE SODIUM PHOSPHATE 20 MG/5ML SOLUTION: Performed by: REGISTERED NURSE

## 2024-09-05 PROCEDURE — 25010000002 PROPOFOL 200 MG/20ML EMULSION: Performed by: REGISTERED NURSE

## 2024-09-05 PROCEDURE — 25810000003 LACTATED RINGERS PER 1000 ML: Performed by: ANESTHESIOLOGY

## 2024-09-05 PROCEDURE — 25010000002 ONDANSETRON PER 1 MG: Performed by: REGISTERED NURSE

## 2024-09-05 PROCEDURE — 25010000002 SUGAMMADEX 200 MG/2ML SOLUTION: Performed by: REGISTERED NURSE

## 2024-09-05 PROCEDURE — 25010000002 FENTANYL CITRATE (PF) 50 MCG/ML SOLUTION: Performed by: REGISTERED NURSE

## 2024-09-05 PROCEDURE — 25010000002 CEFOXITIN PER 1 G: Performed by: STUDENT IN AN ORGANIZED HEALTH CARE EDUCATION/TRAINING PROGRAM

## 2024-09-05 PROCEDURE — 25010000002 KETOROLAC TROMETHAMINE PER 15 MG: Performed by: REGISTERED NURSE

## 2024-09-05 PROCEDURE — 88112 CYTOPATH CELL ENHANCE TECH: CPT | Performed by: STUDENT IN AN ORGANIZED HEALTH CARE EDUCATION/TRAINING PROGRAM

## 2024-09-05 DEVICE — THE ECHELON, ECHELON ENDOPATH™ AND ECHELON FLEX™ FAMILIES OF ENDOSCOPIC LINEAR CUTTERS AND RELOADS ARE STERILE, SINGLE PATIENT USE INSTRUMENTS THAT SIMULTANEOUSLY CUT AND STAPLE TISSUE. THERE ARE SIX STAGGERED ROWS OF STAPLES, THREE ON EITHER SIDE OF THE CUT LINE. THE 45 MM INSTRUMENTS HAVE A STAPLE LINE THATIS APPROXIMATELY 45 MM LONG AND A CUT LINE THAT IS APPROXIMATELY 42 MM LONG. THE SHAFT CAN ROTATE FREELY IN BOTH DIRECTIONS AND AN ARTICULATION MECHANISM ON ARTICULATING INSTRUMENTS ENABLES BENDING THE DISTAL PORTIONOF THE SHAFT TO FACILITATE LATERAL ACCESS OF THE OPERATIVE SITE.THE INSTRUMENTS ARE SHIPPED WITHOUT A RELOAD AND MUST BE LOADED PRIOR TO USE. A STAPLE RETAINING CAP ON THE RELOAD PROTECTS THE STAPLE LEG POINTS DURING SHIPPING AND TRANSPORTATION. THE INSTRUMENTS’ LOCK-OUT FEATURE IS DESIGNED TO PREVENT A USED RELOAD FROM BEING REFIRED.
Type: IMPLANTABLE DEVICE | Site: ABDOMEN | Status: FUNCTIONAL
Brand: ECHELON ENDOPATH

## 2024-09-05 DEVICE — HORIZON TI ML 6 CLIPS/CART
Type: IMPLANTABLE DEVICE | Site: ABDOMEN | Status: FUNCTIONAL
Brand: WECK

## 2024-09-05 RX ORDER — HYDROMORPHONE HYDROCHLORIDE 1 MG/ML
0.5 INJECTION, SOLUTION INTRAMUSCULAR; INTRAVENOUS; SUBCUTANEOUS
Status: DISCONTINUED | OUTPATIENT
Start: 2024-09-05 | End: 2024-09-05 | Stop reason: HOSPADM

## 2024-09-05 RX ORDER — HYDROCODONE BITARTRATE AND ACETAMINOPHEN 5; 325 MG/1; MG/1
1 TABLET ORAL EVERY 6 HOURS PRN
Qty: 15 TABLET | Refills: 0 | Status: SHIPPED | OUTPATIENT
Start: 2024-09-05

## 2024-09-05 RX ORDER — FENTANYL CITRATE 50 UG/ML
50 INJECTION, SOLUTION INTRAMUSCULAR; INTRAVENOUS
Status: DISCONTINUED | OUTPATIENT
Start: 2024-09-05 | End: 2024-09-05 | Stop reason: HOSPADM

## 2024-09-05 RX ORDER — MIDAZOLAM HYDROCHLORIDE 1 MG/ML
1 INJECTION INTRAMUSCULAR; INTRAVENOUS
Status: DISCONTINUED | OUTPATIENT
Start: 2024-09-05 | End: 2024-09-05 | Stop reason: HOSPADM

## 2024-09-05 RX ORDER — EPHEDRINE SULFATE 50 MG/ML
5 INJECTION, SOLUTION INTRAVENOUS ONCE AS NEEDED
Status: DISCONTINUED | OUTPATIENT
Start: 2024-09-05 | End: 2024-09-05 | Stop reason: HOSPADM

## 2024-09-05 RX ORDER — HYDRALAZINE HYDROCHLORIDE 20 MG/ML
5 INJECTION INTRAMUSCULAR; INTRAVENOUS
Status: DISCONTINUED | OUTPATIENT
Start: 2024-09-05 | End: 2024-09-05 | Stop reason: HOSPADM

## 2024-09-05 RX ORDER — PROMETHAZINE HYDROCHLORIDE 25 MG/1
25 TABLET ORAL ONCE AS NEEDED
Status: DISCONTINUED | OUTPATIENT
Start: 2024-09-05 | End: 2024-09-05 | Stop reason: HOSPADM

## 2024-09-05 RX ORDER — PROPOFOL 10 MG/ML
INJECTION, EMULSION INTRAVENOUS AS NEEDED
Status: DISCONTINUED | OUTPATIENT
Start: 2024-09-05 | End: 2024-09-05 | Stop reason: SURG

## 2024-09-05 RX ORDER — DROPERIDOL 2.5 MG/ML
0.62 INJECTION, SOLUTION INTRAMUSCULAR; INTRAVENOUS
Status: DISCONTINUED | OUTPATIENT
Start: 2024-09-05 | End: 2024-09-05 | Stop reason: HOSPADM

## 2024-09-05 RX ORDER — SODIUM CHLORIDE 0.9 % (FLUSH) 0.9 %
3-10 SYRINGE (ML) INJECTION AS NEEDED
Status: DISCONTINUED | OUTPATIENT
Start: 2024-09-05 | End: 2024-09-05 | Stop reason: HOSPADM

## 2024-09-05 RX ORDER — FENTANYL CITRATE 50 UG/ML
INJECTION, SOLUTION INTRAMUSCULAR; INTRAVENOUS AS NEEDED
Status: DISCONTINUED | OUTPATIENT
Start: 2024-09-05 | End: 2024-09-05 | Stop reason: SURG

## 2024-09-05 RX ORDER — DIPHENHYDRAMINE HYDROCHLORIDE 50 MG/ML
12.5 INJECTION INTRAMUSCULAR; INTRAVENOUS
Status: DISCONTINUED | OUTPATIENT
Start: 2024-09-05 | End: 2024-09-05 | Stop reason: HOSPADM

## 2024-09-05 RX ORDER — PROMETHAZINE HYDROCHLORIDE 25 MG/1
25 SUPPOSITORY RECTAL ONCE AS NEEDED
Status: DISCONTINUED | OUTPATIENT
Start: 2024-09-05 | End: 2024-09-05 | Stop reason: HOSPADM

## 2024-09-05 RX ORDER — SODIUM CHLORIDE, SODIUM LACTATE, POTASSIUM CHLORIDE, CALCIUM CHLORIDE 600; 310; 30; 20 MG/100ML; MG/100ML; MG/100ML; MG/100ML
9 INJECTION, SOLUTION INTRAVENOUS CONTINUOUS
Status: DISCONTINUED | OUTPATIENT
Start: 2024-09-05 | End: 2024-09-05 | Stop reason: HOSPADM

## 2024-09-05 RX ORDER — FENTANYL CITRATE 50 UG/ML
50 INJECTION, SOLUTION INTRAMUSCULAR; INTRAVENOUS ONCE AS NEEDED
Status: DISCONTINUED | OUTPATIENT
Start: 2024-09-05 | End: 2024-09-05 | Stop reason: HOSPADM

## 2024-09-05 RX ORDER — ONDANSETRON 2 MG/ML
INJECTION INTRAMUSCULAR; INTRAVENOUS AS NEEDED
Status: DISCONTINUED | OUTPATIENT
Start: 2024-09-05 | End: 2024-09-05 | Stop reason: SURG

## 2024-09-05 RX ORDER — BUPIVACAINE HYDROCHLORIDE 5 MG/ML
INJECTION, SOLUTION PERINEURAL AS NEEDED
Status: DISCONTINUED | OUTPATIENT
Start: 2024-09-05 | End: 2024-09-05 | Stop reason: HOSPADM

## 2024-09-05 RX ORDER — FAMOTIDINE 10 MG/ML
20 INJECTION, SOLUTION INTRAVENOUS ONCE
Status: COMPLETED | OUTPATIENT
Start: 2024-09-05 | End: 2024-09-05

## 2024-09-05 RX ORDER — SODIUM CHLORIDE 0.9 % (FLUSH) 0.9 %
3 SYRINGE (ML) INJECTION EVERY 12 HOURS SCHEDULED
Status: DISCONTINUED | OUTPATIENT
Start: 2024-09-05 | End: 2024-09-05 | Stop reason: HOSPADM

## 2024-09-05 RX ORDER — LIDOCAINE HYDROCHLORIDE 10 MG/ML
0.5 INJECTION, SOLUTION INFILTRATION; PERINEURAL ONCE AS NEEDED
Status: DISCONTINUED | OUTPATIENT
Start: 2024-09-05 | End: 2024-09-05 | Stop reason: HOSPADM

## 2024-09-05 RX ORDER — FAMOTIDINE 10 MG/ML
20 INJECTION, SOLUTION INTRAVENOUS ONCE
Status: DISCONTINUED | OUTPATIENT
Start: 2024-09-05 | End: 2024-09-05 | Stop reason: HOSPADM

## 2024-09-05 RX ORDER — LIDOCAINE HYDROCHLORIDE 20 MG/ML
INJECTION, SOLUTION INFILTRATION; PERINEURAL AS NEEDED
Status: DISCONTINUED | OUTPATIENT
Start: 2024-09-05 | End: 2024-09-05 | Stop reason: SURG

## 2024-09-05 RX ORDER — NALOXONE HCL 0.4 MG/ML
0.2 VIAL (ML) INJECTION AS NEEDED
Status: DISCONTINUED | OUTPATIENT
Start: 2024-09-05 | End: 2024-09-05 | Stop reason: HOSPADM

## 2024-09-05 RX ORDER — MAGNESIUM HYDROXIDE 1200 MG/15ML
LIQUID ORAL AS NEEDED
Status: DISCONTINUED | OUTPATIENT
Start: 2024-09-05 | End: 2024-09-05 | Stop reason: HOSPADM

## 2024-09-05 RX ORDER — ONDANSETRON 2 MG/ML
4 INJECTION INTRAMUSCULAR; INTRAVENOUS ONCE AS NEEDED
Status: DISCONTINUED | OUTPATIENT
Start: 2024-09-05 | End: 2024-09-05 | Stop reason: HOSPADM

## 2024-09-05 RX ORDER — DEXAMETHASONE SODIUM PHOSPHATE 4 MG/ML
INJECTION, SOLUTION INTRA-ARTICULAR; INTRALESIONAL; INTRAMUSCULAR; INTRAVENOUS; SOFT TISSUE AS NEEDED
Status: DISCONTINUED | OUTPATIENT
Start: 2024-09-05 | End: 2024-09-05 | Stop reason: SURG

## 2024-09-05 RX ORDER — PHENYLEPHRINE HCL IN 0.9% NACL 1 MG/10 ML
SYRINGE (ML) INTRAVENOUS AS NEEDED
Status: DISCONTINUED | OUTPATIENT
Start: 2024-09-05 | End: 2024-09-05 | Stop reason: SURG

## 2024-09-05 RX ORDER — HYDROCODONE BITARTRATE AND ACETAMINOPHEN 5; 325 MG/1; MG/1
1 TABLET ORAL ONCE AS NEEDED
Status: COMPLETED | OUTPATIENT
Start: 2024-09-05 | End: 2024-09-05

## 2024-09-05 RX ORDER — IPRATROPIUM BROMIDE AND ALBUTEROL SULFATE 2.5; .5 MG/3ML; MG/3ML
3 SOLUTION RESPIRATORY (INHALATION) ONCE AS NEEDED
Status: DISCONTINUED | OUTPATIENT
Start: 2024-09-05 | End: 2024-09-05 | Stop reason: HOSPADM

## 2024-09-05 RX ORDER — ROCURONIUM BROMIDE 10 MG/ML
INJECTION, SOLUTION INTRAVENOUS AS NEEDED
Status: DISCONTINUED | OUTPATIENT
Start: 2024-09-05 | End: 2024-09-05 | Stop reason: SURG

## 2024-09-05 RX ORDER — SODIUM CHLORIDE 9 MG/ML
INJECTION, SOLUTION INTRAVENOUS AS NEEDED
Status: DISCONTINUED | OUTPATIENT
Start: 2024-09-05 | End: 2024-09-05 | Stop reason: HOSPADM

## 2024-09-05 RX ORDER — KETOROLAC TROMETHAMINE 30 MG/ML
INJECTION, SOLUTION INTRAMUSCULAR; INTRAVENOUS AS NEEDED
Status: DISCONTINUED | OUTPATIENT
Start: 2024-09-05 | End: 2024-09-05 | Stop reason: SURG

## 2024-09-05 RX ORDER — LABETALOL HYDROCHLORIDE 5 MG/ML
5 INJECTION, SOLUTION INTRAVENOUS
Status: DISCONTINUED | OUTPATIENT
Start: 2024-09-05 | End: 2024-09-05 | Stop reason: HOSPADM

## 2024-09-05 RX ORDER — OXYCODONE AND ACETAMINOPHEN 7.5; 325 MG/1; MG/1
1 TABLET ORAL EVERY 4 HOURS PRN
Status: DISCONTINUED | OUTPATIENT
Start: 2024-09-05 | End: 2024-09-05 | Stop reason: HOSPADM

## 2024-09-05 RX ORDER — FLUMAZENIL 0.1 MG/ML
0.2 INJECTION INTRAVENOUS AS NEEDED
Status: DISCONTINUED | OUTPATIENT
Start: 2024-09-05 | End: 2024-09-05 | Stop reason: HOSPADM

## 2024-09-05 RX ADMIN — SODIUM CHLORIDE, POTASSIUM CHLORIDE, SODIUM LACTATE AND CALCIUM CHLORIDE 9 ML/HR: 600; 310; 30; 20 INJECTION, SOLUTION INTRAVENOUS at 07:19

## 2024-09-05 RX ADMIN — PROPOFOL 100 MG: 10 INJECTION, EMULSION INTRAVENOUS at 08:05

## 2024-09-05 RX ADMIN — KETOROLAC TROMETHAMINE 30 MG: 30 INJECTION, SOLUTION INTRAMUSCULAR at 08:07

## 2024-09-05 RX ADMIN — DEXAMETHASONE SODIUM PHOSPHATE 6 MG: 4 INJECTION, SOLUTION INTRAMUSCULAR; INTRAVENOUS at 08:08

## 2024-09-05 RX ADMIN — Medication 100 MCG: at 08:21

## 2024-09-05 RX ADMIN — FENTANYL CITRATE 50 MCG: 50 INJECTION, SOLUTION INTRAMUSCULAR; INTRAVENOUS at 08:05

## 2024-09-05 RX ADMIN — SUGAMMADEX 100 MG: 100 INJECTION, SOLUTION INTRAVENOUS at 08:42

## 2024-09-05 RX ADMIN — SODIUM CHLORIDE, POTASSIUM CHLORIDE, SODIUM LACTATE AND CALCIUM CHLORIDE: 600; 310; 30; 20 INJECTION, SOLUTION INTRAVENOUS at 08:37

## 2024-09-05 RX ADMIN — ONDANSETRON 4 MG: 2 INJECTION INTRAMUSCULAR; INTRAVENOUS at 08:08

## 2024-09-05 RX ADMIN — MIDAZOLAM 1 MG: 1 INJECTION INTRAMUSCULAR; INTRAVENOUS at 07:20

## 2024-09-05 RX ADMIN — ROCURONIUM BROMIDE 40 MG: 10 INJECTION, SOLUTION INTRAVENOUS at 08:07

## 2024-09-05 RX ADMIN — FAMOTIDINE 20 MG: 10 INJECTION INTRAVENOUS at 07:19

## 2024-09-05 RX ADMIN — LIDOCAINE HYDROCHLORIDE 60 MG: 20 INJECTION, SOLUTION INFILTRATION; PERINEURAL at 08:05

## 2024-09-05 RX ADMIN — HYDROCODONE BITARTRATE AND ACETAMINOPHEN 1 TABLET: 5; 325 TABLET ORAL at 09:24

## 2024-09-05 RX ADMIN — CEFOXITIN 1000 MG: 1 INJECTION, POWDER, FOR SOLUTION INTRAVENOUS at 07:51

## 2024-09-05 NOTE — ANESTHESIA POSTPROCEDURE EVALUATION
Patient: Savita Lucio    Procedure Summary       Date: 09/05/24 Room / Location: [facility] OR [address] [place] [address] MAIN OR    Anesthesia Start: 0757 Anesthesia Stop: 0904    Procedure: APPENDECTOMY LAPAROSCOPIC POSSIBLE OPEN (Abdomen) Diagnosis:       Mucocele of appendix      (Mucocele of appendix [K38.8])    Surgeons: Trenton Lao MD Provider: Reva Villalobos MD    Anesthesia Type: general ASA Status: 2            Anesthesia Type: general    Vitals  Vitals Value Taken Time   /77 09/05/24 0930   Temp 36.4 °C (97.6 °F) 09/05/24 0902   Pulse 72 09/05/24 0930   Resp 16 09/05/24 0930   SpO2 99 % 09/05/24 0930           Anesthesia Post Evaluation

## 2024-09-05 NOTE — ANESTHESIA PROCEDURE NOTES
Airway  Urgency: elective    Date/Time: 9/5/2024 8:09 AM  Airway not difficult    General Information and Staff    Patient location during procedure: OR  Anesthesiologist: Reva Villalobos MD  CRNA/CAA: Dariusz Padilla CRNA    Indications and Patient Condition  Indications for airway management: airway protection    Preoxygenated: yes  MILS not maintained throughout  Mask difficulty assessment: 1 - vent by mask    Final Airway Details  Final airway type: endotracheal airway      Successful airway: ETT  Cuffed: yes   Successful intubation technique: direct laryngoscopy  Endotracheal tube insertion site: oral  Blade: Austin  Blade size: 3  ETT size (mm): 7.0  Cormack-Lehane Classification: grade I - full view of glottis  Placement verified by: chest auscultation and capnometry   Cuff volume (mL): 10  Measured from: teeth  ETT/EBT  to teeth (cm): 22  Number of attempts at approach: 1  Assessment: lips, teeth, and gum same as pre-op and atraumatic intubation

## 2024-09-05 NOTE — DISCHARGE INSTRUCTIONS
Do not drive while taking narcotics.  Let skin glue fall off on its own.  May shower tomorrow.  No heavy lifting over 20 pounds for 2 weeks.  Call my office with any of the following: Fever greater than 101.5, persistent nausea and vomiting, severe abdominal pain, signs of infection.

## 2024-09-05 NOTE — INTERVAL H&P NOTE
H&P reviewed. The patient was examined and there are no changes to the H&P.    Patient with chronic right lower quadrant pain and concern for possible appendiceal mucinous neoplasm here for laparoscopic appendectomy. Discussed risks of bleeding, infection, and potential need for partial colectomy in the future depending on pathology.

## 2024-09-05 NOTE — ANESTHESIA PREPROCEDURE EVALUATION
Anesthesia Evaluation     NPO Solid Status: > 8 hours             Airway   Mallampati: II  TM distance: >3 FB  Neck ROM: full  No difficulty expected  Dental - normal exam     Comment: One cap or implant and 2 adjacent teeth are part of a bridge.      Pulmonary    Cardiovascular     Patient on routine beta blocker and Beta blocker given within 24 hours of surgery    (+) hypertension, hyperlipidemia      Neuro/Psych  (+) psychiatric history  GI/Hepatic/Renal/Endo    (+) GERD, liver disease    Musculoskeletal     Abdominal    Substance History      OB/GYN          Other   arthritis,                 Anesthesia Plan    ASA 2     general     intravenous induction     Anesthetic plan, risks, benefits, and alternatives have been provided, discussed and informed consent has been obtained with: patient.    CODE STATUS:

## 2024-09-05 NOTE — OP NOTE
OPERATIVE REPORT    DATE: 09/05/24     SURGEON: Trenton Lao MD     ASSISTANT: Aby Martell, who was present for necessary suctioning, retracting, suturing and camera holding throughout the procedure     OPERATION PERFORMED:   Laparoscopic appendectomy  Peritoneal washing    PREOPERATIVE DIAGNOSIS: Appendiceal mucocele    POSTOPERATIVE DIAGNOSIS: Appendiceal mucocele    ANESTHESIA: General    SPECIMEN: Appendix, peritoneal fluid    DRAINS: none    BLOOD LOSS: 5cc     INDICATION FOR OPERATION: This is a 59 y.o. lady with chronic right lower quadrant pain who was referred to me with CT scan showing fluid-filled enlarged appendix concerning for appendiceal mucinous neoplasm. Risks including bleeding, postoperative abscess, damage to surrounding structures, colon resection, and conversion to open surgery, as well as benefits and alternatives to appendectomy were discussed with the patient. They agreed to proceed.     OPERATIVE COURSE: The patient was taken to the operating room and positioned supine. General endotracheal anesthesia was induced. SCDs were applied and a temporary menon catheter was inserted. The abdomen was prepped and draped in sterile fashion. Preoperative antibiotics were administered. A pre-procedural timeout was performed.    The abdomen was insufflated with a Veress needle in the left upper quadrant.  A 5 mm trocar was placed to the left of the umbilicus with the Optiview technique.  A 12 mm trocar was inserted under direct vision in the right upper quadrant.  An additional 5 mm trocar was inserted in the left lower quadrant under direct vision.  There was no ascites or peritoneal implants.  There was a benign-appearing cyst of the liver overlying segment IV, as seen on her CT scan.  There was no evidence of liver metastases.  Peritoneal washing was performed with 50cc of normal saline, the fluid specimen was sent for cytology. The patient was positioned in Trendelenburg with right side up.  There  were no adhesions.  The appendix appeared enlarged but not inflamed.  The appendiceal base and cecum appeared normal.  The cecum and terminal ileum were identified and appeared normal.  A mesenteric window was created with blunt dissection adjacent to the base of the appendix flush with the cecum.  The appendix was transected at its junction with the cecum with a 45 mm stapler.  The mesoappendix was divided with an energy device.  I divided the mesoappendix further from the appendix than I normally would to include any associated lymph nodes.  The appendix was removed through the 12 mm trocar site with an Endo Catch bag.  The staple line and mesoappendix were inspected, there was no ongoing bleeding. The 12 mm trocar site was closed with 0 Vicryl suture using the Jean-Josiah suture passer.  Trocars were removed and the abdomen was desufflated.  Trocar sites were reapproximated with 4-0 Vicryl suture and closed with Exofin.  Inspection of the specimen on the back table revealed some mild thickening near the tip of the appendix. All needle, sponge, and instrument counts were correct at case conclusion. The patient was extubated and transported to the recovery room in stable condition.      Trenton Lao MD   General Surgery    [address], [address]  [place], [place], [address]  P: [phone]  F: [phone]

## 2024-09-06 LAB
CYTO UR: NORMAL
CYTO UR: NORMAL
LAB AP CASE REPORT: NORMAL
LAB AP CASE REPORT: NORMAL
PATH REPORT.FINAL DX SPEC: NORMAL
PATH REPORT.FINAL DX SPEC: NORMAL
PATH REPORT.GROSS SPEC: NORMAL
PATH REPORT.GROSS SPEC: NORMAL

## 2024-09-09 ENCOUNTER — TELEPHONE (OUTPATIENT)
Dept: SURGERY | Facility: CLINIC | Age: 59
End: 2024-09-09
Payer: COMMERCIAL

## 2024-09-09 NOTE — TELEPHONE ENCOUNTER
Patient son called saying that the pharmacy told her that she can't get another refill of her pain meds. Patient is requesting a refill. I have verified that the pharmacy we have on file is correct. Please Advise, Thank You

## 2024-09-23 ENCOUNTER — OFFICE VISIT (OUTPATIENT)
Dept: SURGERY | Facility: CLINIC | Age: 59
End: 2024-09-23
Payer: COMMERCIAL

## 2024-09-23 VITALS
DIASTOLIC BLOOD PRESSURE: 88 MMHG | OXYGEN SATURATION: 98 % | WEIGHT: 113.2 LBS | BODY MASS INDEX: 23.76 KG/M2 | HEIGHT: 58 IN | SYSTOLIC BLOOD PRESSURE: 132 MMHG | HEART RATE: 82 BPM

## 2024-09-23 DIAGNOSIS — K38.8 MUCOCELE OF APPENDIX: Primary | ICD-10-CM

## 2024-09-23 PROCEDURE — 99024 POSTOP FOLLOW-UP VISIT: CPT | Performed by: STUDENT IN AN ORGANIZED HEALTH CARE EDUCATION/TRAINING PROGRAM

## 2024-10-22 DIAGNOSIS — J30.1 SEASONAL ALLERGIC RHINITIS DUE TO POLLEN: ICD-10-CM

## 2024-10-22 DIAGNOSIS — I10 BENIGN ESSENTIAL HTN: ICD-10-CM

## 2024-10-22 DIAGNOSIS — E78.2 MIXED HYPERLIPIDEMIA: ICD-10-CM

## 2024-10-23 RX ORDER — LOVASTATIN 40 MG
40 TABLET ORAL NIGHTLY
Qty: 90 TABLET | Refills: 3 | Status: SHIPPED | OUTPATIENT
Start: 2024-10-23

## 2024-10-23 RX ORDER — LORATADINE 10 MG/1
10 TABLET ORAL DAILY
Qty: 90 TABLET | Refills: 3 | Status: SHIPPED | OUTPATIENT
Start: 2024-10-23

## 2024-10-23 RX ORDER — CHLORTHALIDONE 25 MG/1
25 TABLET ORAL DAILY
Qty: 90 TABLET | Refills: 3 | Status: SHIPPED | OUTPATIENT
Start: 2024-10-23

## (undated) DEVICE — HARMONIC 700 SHEARS, ADVANCED HEMOSTASIS: Brand: HARMONIC

## (undated) DEVICE — INTENDED FOR TISSUE SEPARATION, AND OTHER PROCEDURES THAT REQUIRE A SHARP SURGICAL BLADE TO PUNCTURE OR CUT.: Brand: BARD-PARKER ® CARBON RIB-BACK BLADES

## (undated) DEVICE — NDL FLTR 19G 1 1/2 LF

## (undated) DEVICE — ENDOPATH XCEL BLADELESS TROCARS WITH STABILITY SLEEVES: Brand: ENDOPATH XCEL

## (undated) DEVICE — ANTIBACTERIAL UNDYED BRAIDED (POLYGLACTIN 910), SYNTHETIC ABSORBABLE SUTURE: Brand: COATED VICRYL

## (undated) DEVICE — SYR LL TP 10ML STRL

## (undated) DEVICE — SUT VIC 0 UR6 27IN VCP603H

## (undated) DEVICE — 3M™ STERI-STRIP™ REINFORCED ADHESIVE SKIN CLOSURES, R1547, 1/2 IN X 4 IN (12 MM X 100 MM), 6 STRIPS/ENVELOPE: Brand: 3M™ STERI-STRIP™

## (undated) DEVICE — ENDOPATH PNEUMONEEDLE INSUFFLATION NEEDLES WITH LUER LOCK CONNECTORS 120MM: Brand: ENDOPATH

## (undated) DEVICE — DRSNG WND GZ PAD BORDERED 4X8IN STRL

## (undated) DEVICE — SKIN PREP TRAY W/CHG: Brand: MEDLINE INDUSTRIES, INC.

## (undated) DEVICE — GLV SURG BIOGEL LTX PF 7 1/2

## (undated) DEVICE — TBG PENCL TELESCP MEGADYNE SMOKE EVAC 10FT

## (undated) DEVICE — SUT MNCRYL PLS ANTIB UD 4/0 PS2 18IN

## (undated) DEVICE — SOL NACL 0.9PCT 1000ML

## (undated) DEVICE — CONN TBG Y 5 IN 1 LF STRL

## (undated) DEVICE — CAUTERY TIP POLISHER: Brand: DEVON

## (undated) DEVICE — DISPOSABLE MONOPOLAR ENDOSCOPIC CORD 10 FT. (3M): Brand: KIRWAN

## (undated) DEVICE — LOU LAP CHOLE: Brand: MEDLINE INDUSTRIES, INC.

## (undated) DEVICE — 3M™ IOBAN™ 2 ANTIMICROBIAL INCISE DRAPE 6650EZ: Brand: IOBAN™ 2

## (undated) DEVICE — ECHELON FLEX45 ENDOPATH STAPLER, ARTICULATING ENDOSCOPIC LINEAR CUTTER (NO CARTRIDGE): Brand: ECHELON ENDOPATH

## (undated) DEVICE — SMOKE EVACUATION TUBING WITH 7/8 IN TO 1/4 IN REDUCER: Brand: BUFFALO FILTER

## (undated) DEVICE — ENDOCUT SCISSOR TIP, DISPOSABLE: Brand: RENEW

## (undated) DEVICE — TRAP FLD MINIVAC MEGADYNE 100ML

## (undated) DEVICE — LAPAROVUE VISIBILITY SYSTEM LAPAROSCOPIC SOLUTIONS: Brand: LAPAROVUE

## (undated) DEVICE — PK CHST BRST 40

## (undated) DEVICE — SYR LUERLOK 20CC BX/50

## (undated) DEVICE — SUT VIC 3/0 TIES 18IN J110T

## (undated) DEVICE — NDL HYPO PRECISIONGLIDE REG 25G 1 1/2

## (undated) DEVICE — ENDOPOUCH RETRIEVER SPECIMEN RETRIEVAL BAGS: Brand: ENDOPOUCH RETRIEVER

## (undated) DEVICE — TOTAL TRAY, 16FR 10ML SIL FOLEY, URN: Brand: MEDLINE

## (undated) DEVICE — GLV SURG BIOGEL LTX PF 7

## (undated) DEVICE — ENDOPATH XCEL UNIVERSAL TROCAR STABLILITY SLEEVES: Brand: ENDOPATH XCEL

## (undated) DEVICE — SYS CLS CARTR/THOMSN SG 10/12 AND 15MM

## (undated) DEVICE — SUT VIC 0 TN 27IN DYED JTN0G

## (undated) DEVICE — LAPAROSCOPIC SMOKE FILTRATION SYSTEM: Brand: PALL LAPAROSHIELD® PLUS LAPAROSCOPIC SMOKE FILTRATION SYSTEM

## (undated) DEVICE — ADHS SKIN SURG TISS VISC PREMIERPRO EXOFIN HI/VISC FAST/DRY